# Patient Record
Sex: FEMALE | Race: WHITE | Employment: OTHER | URBAN - METROPOLITAN AREA
[De-identification: names, ages, dates, MRNs, and addresses within clinical notes are randomized per-mention and may not be internally consistent; named-entity substitution may affect disease eponyms.]

---

## 2017-01-06 ENCOUNTER — OFFICE VISIT (OUTPATIENT)
Dept: NEUROLOGY | Age: 67
End: 2017-01-06

## 2017-01-06 VITALS
HEART RATE: 80 BPM | OXYGEN SATURATION: 95 % | BODY MASS INDEX: 42.27 KG/M2 | SYSTOLIC BLOOD PRESSURE: 132 MMHG | DIASTOLIC BLOOD PRESSURE: 60 MMHG | HEIGHT: 66 IN | WEIGHT: 263 LBS

## 2017-01-06 DIAGNOSIS — R51.9 CHRONIC DAILY HEADACHE: ICD-10-CM

## 2017-01-06 DIAGNOSIS — R20.0 NUMBNESS AND TINGLING IN BOTH HANDS: ICD-10-CM

## 2017-01-06 DIAGNOSIS — G47.00 INSOMNIA, UNSPECIFIED TYPE: ICD-10-CM

## 2017-01-06 DIAGNOSIS — R20.2 NUMBNESS AND TINGLING IN BOTH HANDS: ICD-10-CM

## 2017-01-06 DIAGNOSIS — R42 DIZZINESS: Primary | ICD-10-CM

## 2017-01-06 RX ORDER — AMITRIPTYLINE HYDROCHLORIDE 10 MG/1
10 TABLET, FILM COATED ORAL
Qty: 30 TAB | Refills: 5 | Status: SHIPPED | OUTPATIENT
Start: 2017-01-06 | End: 2017-08-15

## 2017-01-06 NOTE — PATIENT INSTRUCTIONS
[facility]   Statement to Patients  April 1, 2014      In an effort to ensure the large volume of patient prescription refills is processed in the most efficient and expeditious manner, we are asking our patients to assist us by calling your Pharmacy for all prescription refills, this will include also your  Mail Order Pharmacy. The pharmacy will contact our office electronically to continue the refill process. Please do not wait until the last minute to call your pharmacy. We need at least 48 hours (2days) to fill prescriptions. We also encourage you to call your pharmacy before going to  your prescription to make sure it is ready. With regard to controlled substance prescription refill requests (narcotic refills) that need to be picked up at our office, we ask your cooperation by providing us with at least 72 hours (3days) notice that you will need a refill. We will not refill narcotic prescription refill requests after 4:00pm on any weekday, Monday through Thursday, or after 2:00pm on Fridays, or on the weekends. We encourage everyone to explore another way of getting your prescription refill request processed using [employer], our patient web portal through our electronic medical record system. [employer] is an efficient and effective way to communicate your medication request directly to the office and  downloadable as an eliz on your smart phone . [employer] also features a review functionality that allows you to view your medication list as well as leave messages for your physician. Are you ready to get connected? If so please review the attatched instructions or speak to any of our staff to get you set up right away! Thank you so much for your cooperation. Should you have any questions please contact our Practice Administrator.     The Physicians and Staff,  [facility]          A Healthy Lifestyle: Care Instructions  Your Care Instructions  A healthy lifestyle can help you feel good, stay at a healthy weight, and have plenty of energy for both work and play. A healthy lifestyle is something you can share with your whole family. A healthy lifestyle also can lower your risk for serious health problems, such as high blood pressure, heart disease, and diabetes. You can follow a few steps listed below to improve your health and the health of your family. Follow-up care is a key part of your treatment and safety. Be sure to make and go to all appointments, and call your doctor if you are having problems. Its also a good idea to know your test results and keep a list of the medicines you take. How can you care for yourself at home? · Do not eat too much sugar, fat, or fast foods. You can still have dessert and treats now and then. The goal is moderation. · Start small to improve your eating habits. Pay attention to portion sizes, drink less juice and soda pop, and eat more fruits and vegetables. ¨ Eat a healthy amount of food. A 3-ounce serving of meat, for example, is about the size of a deck of cards. Fill the rest of your plate with vegetables and whole grains. ¨ Limit the amount of soda and sports drinks you have every day. Drink more water when you are thirsty. ¨ Eat at least 5 servings of fruits and vegetables every day. It may seem like a lot, but it is not hard to reach this goal. A serving or helping is 1 piece of fruit, 1 cup of vegetables, or 2 cups of leafy, raw vegetables. Have an apple or some carrot sticks as an afternoon snack instead of a candy bar. Try to have fruits and/or vegetables at every meal.  · Make exercise part of your daily routine. You may want to start with simple activities, such as walking, bicycling, or slow swimming. Try to be active 30 to 60 minutes every day. You do not need to do all 30 to 60 minutes all at once. For example, you can exercise 3 times a day for 10 or 20 minutes.  Moderate exercise is safe for most people, but it is always a good idea to talk to your doctor before starting an exercise program.  · Keep moving. Teri Sebastians the lawn, work in the garden, or [employer]. Take the stairs instead of the elevator at work. · If you smoke, quit. People who smoke have an increased risk for heart attack, stroke, cancer, and other lung illnesses. Quitting is hard, but there are ways to boost your chance of quitting tobacco for good. ¨ Use nicotine gum, patches, or lozenges. ¨ Ask your doctor about stop-smoking programs and medicines. ¨ Keep trying. In addition to reducing your risk of diseases in the future, you will notice some benefits soon after you stop using tobacco. If you have shortness of breath or asthma symptoms, they will likely get better within a few weeks after you quit. · Limit how much alcohol you drink. Moderate amounts of alcohol (up to 2 drinks a day for men, 1 drink a day for women) are okay. But drinking too much can lead to liver problems, high blood pressure, and other health problems. Family health  If you have a family, there are many things you can do together to improve your health. · Eat meals together as a family as often as possible. · Eat healthy foods. This includes fruits, vegetables, lean meats and dairy, and whole grains. · Include your family in your fitness plan. Most people think of activities such as jogging or tennis as the way to fitness, but there are many ways you and your family can be more active. Anything that makes you breathe hard and gets your heart pumping is exercise. Here are some tips:  ¨ Walk to do errands or to take your child to school or the bus. ¨ Go for a family bike ride after dinner instead of watching TV. Where can you learn more? Go to http://juliana-david.info/. Enter D219 in the search box to learn more about \"A Healthy Lifestyle: Care Instructions. \"  Current as of: July 26, 2016  Content Version: 11.1  © 8762-1055 [employer]. Care instructions adapted under license by [employer] (which disclaims liability or warranty for this information). If you have questions about a medical condition or this instruction, always ask your healthcare professional. [address] any warranty or liability for your use of this information.
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
## 2017-01-06 NOTE — MR AVS SNAPSHOT
Visit Information Date & Time Provider Department Dept. Phone Encounter #  
 1/6/2017  1:00 PM Imelda Ndiaye NP Neurology Clinic at [facility] [phone] [id number] Follow-up Instructions Return for after emg. Your Appointments 2/8/2017  8:00 AM  
PROCEDURE with Martin Sousa MD  
Neurology Clinic at [address]) Appt Note: EMG $0CP CC tdb 1/6/17  
 [address] [place] [address], 
[address] [place] [address], [address] [address]  
[address], [address] [place] [address], [address] [address]  
  
    
 2/8/2017  9:30 AM  
Follow Up with Imelda Ndiaye NP Neurology Clinic at [address]) Appt Note: Follow up $0CP tdb 1/6/17  
 [address] [place] [address], 
[address] [place] [address], [address] [address]  
[address], [address] [place] [address], [address] [address] Upcoming Health Maintenance Date Due Hepatitis C Screening 1950 DTaP/Tdap/Td series (1 - Tdap) 4/21/1969 FOBT Q 1 YEAR AGE 50-75 4/21/1998 ZOSTER VACCINE AGE 60> 4/21/2008 GLAUCOMA SCREENING Q2Y 4/21/2013 Pneumococcal 65+ High/Highest Risk (1 of 2 - PCV13) 4/21/2013 MEDICARE YEARLY EXAM 4/21/2013 BREAST CANCER SCRN MAMMOGRAM 2/25/2015 INFLUENZA AGE 9 TO ADULT 8/1/2016 Allergies as of 1/6/2017  Review Complete On: 1/6/2017 By: John Ohara LPN Severity Noted Reaction Type Reactions Adhes. Band-tape-benzalkonium  01/25/2012    Swelling Dairy Aid [Lactase]  04/12/2012    Unknown (comments) Gluten  05/28/2015    Diarrhea \"brain fog\", aching Iodinated Contrast Media - Oral And Iv Dye  05/28/2015    Other (comments) Gets dizzy and shakey Milk Containing Products  05/28/2015    Other (comments) Aches and stomach cramps Motrin [Ibuprofen]  04/12/2012    Unknown (comments) Other Medication  05/28/2015    Other (comments) Super sensitive to all medication Soy  2012    Unknown (comments) Sulfa (Sulfonamide Antibiotics)  2012    Unable to Obtain Wheat  2012    Unknown (comments) Current Immunizations  Never Reviewed No immunizations on file. Not reviewed this visit You Were Diagnosed With   
  
 Codes Comments Dizziness    -  Primary ICD-10-CM: A82 ICD-9-CM: 780. 4 Chronic daily headache     ICD-10-CM: R51 ICD-9-CM: 784.0 Insomnia, unspecified type     ICD-10-CM: G47.00 ICD-9-CM: 780.52 Numbness and tingling in both hands     ICD-10-CM: R20.2 ICD-9-CM: 793. 0 Vitals BP Pulse Height(growth percentile) Weight(growth percentile) SpO2 BMI  
 132/60 80 5' 6\" (1.676 m) 263 lb (119.3 kg) 95% 42.45 kg/m2 OB Status Smoking Status Postmenopausal Never Smoker Vitals History BMI and BSA Data Body Mass Index Body Surface Area  
 42.45 kg/m 2 2.36 m 2 Preferred Pharmacy Pharmacy Name Phone [facility]/PHARMACY [address] [facility] [address]. Amadou Hinton [phone] Your Updated Medication List  
  
   
This list is accurate as of: 17  1:43 PM.  Always use your most recent med list.  
  
  
  
  
 amitriptyline 10 mg tablet Commonly known as:  ELAVIL Take 1 Tab by mouth nightly. iodine Liqd Commonly known as:  IODINE STRONG Apply  to affected area as needed. * OTHER(NON-FORMULARY) Black cumin seed oil:500mg once daily * OTHER(NON-FORMULARY) Berberine: 200mmg BID  
  
 * OTHER(NON-FORMULARY) MTHFR [id number]+: 2 drops once daily * OTHER(NON-FORMULARY) Meriva: 500mg, 1000mg BID  
  
 * OTHER(NON-FORMULARY) MTR/MTRR+: 2 drops daily TIROSINT 150 mcg Cap Generic drug:  Levothyroxine Take  by mouth.  
  
 tolnaftate 1 % external solution Commonly known as:  [place] Apply  to affected area nightly. VITAMIN D3-VITAMIN K2 PO Take  by mouth. * Notice: This list has 5 medication(s) that are the same as other medications prescribed for you. Read the directions carefully, and ask your doctor or other care provider to review them with you. Prescriptions Sent to Pharmacy Refills  
 amitriptyline (ELAVIL) 10 mg tablet 5 Sig: Take 1 Tab by mouth nightly. Class: Normal  
 Pharmacy: [facility]/pharmacy [address], [address]  #: [phone] Route: Oral  
  
We Performed the Following REFERRAL TO NEUROLOGY [KPD28 Custom] Comments:  
 EMG/NCS BUE  
 REFERRAL TO PHYSICAL THERAPY [XLX58 Custom] Comments: PT eval and treat for vestibular therapy Follow-up Instructions Return for after emg. Referral Information Referral ID Referred By Referred To  
  
 [id number] Glenn Oneil V Not Available Visits Status Start Date End Date 1 New Request 1/6/17 1/6/18 If your referral has a status of pending review or denied, additional information will be sent to support the outcome of this decision. Referral ID Referred By Referred To  
 [id number] Glenn Oneil V Not Available Visits Status Start Date End Date 1 New Request 1/6/17 1/6/18 If your referral has a status of pending review or denied, additional information will be sent to support the outcome of this decision. Patient Instructions PRESCRIPTION REFILL POLICY [facility] Statement to Patients April 1, 2014 In an effort to ensure the large volume of patient prescription refills is processed in the most efficient and expeditious manner, we are asking our patients to assist us by calling your Pharmacy for all prescription refills, this will include also your  Mail Order Pharmacy. The pharmacy will contact our office electronically to continue the refill process. Please do not wait until the last minute to call your pharmacy.  We need at least 48 hours (2days) to fill prescriptions. We also encourage you to call your pharmacy before going to  your prescription to make sure it is ready. With regard to controlled substance prescription refill requests (narcotic refills) that need to be picked up at our office, we ask your cooperation by providing us with at least 72 hours (3days) notice that you will need a refill. We will not refill narcotic prescription refill requests after 4:00pm on any weekday, Monday through Thursday, or after 2:00pm on Fridays, or on the weekends. We encourage everyone to explore another way of getting your prescription refill request processed using [employer]St[employer], our patient web portal through our electronic medical record system. [employer]St[employer]ak is an efficient and effective way to communicate your medication request directly to the office and  downloadable as an eliz on your smart phone . [employer] also features a review functionality that allows you to view your medication list as well as leave messages for your physician. Are you ready to get connected? If so please review the attatched instructions or speak to any of our staff to get you set up right away! Thank you so much for your cooperation. Should you have any questions please contact our Practice Administrator. The Physicians and Staff,  [facility] A Healthy Lifestyle: Care Instructions Your Care Instructions A healthy lifestyle can help you feel good, stay at a healthy weight, and have plenty of energy for both work and play. A healthy lifestyle is something you can share with your whole family. A healthy lifestyle also can lower your risk for serious health problems, such as high blood pressure, heart disease, and diabetes. You can follow a few steps listed below to improve your health and the health of your family. Follow-up care is a key part of your treatment and safety.  Be sure to make and go to all appointments, and call your doctor if you are having problems. Its also a good idea to know your test results and keep a list of the medicines you take. How can you care for yourself at home? · Do not eat too much sugar, fat, or fast foods. You can still have dessert and treats now and then. The goal is moderation. · Start small to improve your eating habits. Pay attention to portion sizes, drink less juice and soda pop, and eat more fruits and vegetables. ¨ Eat a healthy amount of food. A 3-ounce serving of meat, for example, is about the size of a deck of cards. Fill the rest of your plate with vegetables and whole grains. ¨ Limit the amount of soda and sports drinks you have every day. Drink more water when you are thirsty. ¨ Eat at least 5 servings of fruits and vegetables every day. It may seem like a lot, but it is not hard to reach this goal. A serving or helping is 1 piece of fruit, 1 cup of vegetables, or 2 cups of leafy, raw vegetables. Have an apple or some carrot sticks as an afternoon snack instead of a candy bar. Try to have fruits and/or vegetables at every meal. 
· Make exercise part of your daily routine. You may want to start with simple activities, such as walking, bicycling, or slow swimming. Try to be active 30 to 60 minutes every day. You do not need to do all 30 to 60 minutes all at once. For example, you can exercise 3 times a day for 10 or 20 minutes. Moderate exercise is safe for most people, but it is always a good idea to talk to your doctor before starting an exercise program. 
· Keep moving. Delma Boys the lawn, work in the garden, or [employer]. Take the stairs instead of the elevator at work. · If you smoke, quit. People who smoke have an increased risk for heart attack, stroke, cancer, and other lung illnesses. Quitting is hard, but there are ways to boost your chance of quitting tobacco for good. ¨ Use nicotine gum, patches, or lozenges. ¨ Ask your doctor about stop-smoking programs and medicines. ¨ Keep trying. In addition to reducing your risk of diseases in the future, you will notice some benefits soon after you stop using tobacco. If you have shortness of breath or asthma symptoms, they will likely get better within a few weeks after you quit. · Limit how much alcohol you drink. Moderate amounts of alcohol (up to 2 drinks a day for men, 1 drink a day for women) are okay. But drinking too much can lead to liver problems, high blood pressure, and other health problems. Family health If you have a family, there are many things you can do together to improve your health. · Eat meals together as a family as often as possible. · Eat healthy foods. This includes fruits, vegetables, lean meats and dairy, and whole grains. · Include your family in your fitness plan. Most people think of activities such as jogging or tennis as the way to fitness, but there are many ways you and your family can be more active. Anything that makes you breathe hard and gets your heart pumping is exercise. Here are some tips: 
¨ Walk to do errands or to take your child to school or the bus. ¨ Go for a family bike ride after dinner instead of watching TV. Where can you learn more? Go to http://juliana-david.info/. Enter H729 in the search box to learn more about \"A Healthy Lifestyle: Care Instructions. \" Current as of: July 26, 2016 Content Version: 11.1 © 4254-9647 [employer]. Care instructions adapted under license by [employer] (which disclaims liability or warranty for this information). If you have questions about a medical condition or this instruction, always ask your healthcare professional. [address] any warranty or liability for your use of this information. Introducing [facility]! Dear Hussain Harkins: 
Thank you for requesting a [facility] account.   Our records indicate that you already have an active [employer] account. You can access your account anytime at [email or web address] Did you know that you can access your hospital and ER discharge instructions at any time in [employer]? You can also review all of your test results from your hospital stay or ER visit. Additional Information If you have questions, please visit the Frequently Asked Questions section of the [employer] website at [email or web address] Remember, [employer] is NOT to be used for urgent needs. For medical emergencies, dial 911. Now available from your iPhone and Android! Please provide this summary of care documentation to your next provider. Your primary care clinician is listed as [address] [place] [address]. If you have any questions after today's visit, please call [phone].

## 2017-04-19 ENCOUNTER — OFFICE VISIT (OUTPATIENT)
Dept: NEUROLOGY | Age: 67
End: 2017-04-19

## 2017-04-19 VITALS
OXYGEN SATURATION: 97 % | DIASTOLIC BLOOD PRESSURE: 61 MMHG | HEART RATE: 67 BPM | WEIGHT: 264 LBS | BODY MASS INDEX: 42.43 KG/M2 | SYSTOLIC BLOOD PRESSURE: 144 MMHG | HEIGHT: 66 IN

## 2017-04-19 VITALS
OXYGEN SATURATION: 97 % | DIASTOLIC BLOOD PRESSURE: 61 MMHG | HEART RATE: 67 BPM | SYSTOLIC BLOOD PRESSURE: 144 MMHG | RESPIRATION RATE: 18 BRPM

## 2017-04-19 DIAGNOSIS — M47.22 CERVICAL RADICULOPATHY DUE TO DEGENERATIVE JOINT DISEASE OF SPINE: ICD-10-CM

## 2017-04-19 DIAGNOSIS — G56.03 CARPAL TUNNEL SYNDROME, BILATERAL UPPER LIMBS: Primary | ICD-10-CM

## 2017-04-19 DIAGNOSIS — G43.009 MIGRAINE WITHOUT AURA AND WITHOUT STATUS MIGRAINOSUS, NOT INTRACTABLE: ICD-10-CM

## 2017-04-19 DIAGNOSIS — G56.03 BILATERAL CARPAL TUNNEL SYNDROME: Primary | ICD-10-CM

## 2017-04-19 DIAGNOSIS — R42 DIZZINESS: ICD-10-CM

## 2017-04-19 DIAGNOSIS — G93.32 CHRONIC FATIGUE SYNDROME: ICD-10-CM

## 2017-04-19 DIAGNOSIS — M79.7 FIBROMYALGIA: ICD-10-CM

## 2017-04-19 DIAGNOSIS — E03.9 HYPOTHYROIDISM (ACQUIRED): ICD-10-CM

## 2017-04-19 DIAGNOSIS — G56.22 ULNAR NERVE COMPRESSION, LEFT: ICD-10-CM

## 2017-04-19 DIAGNOSIS — G25.0 ESSENTIAL TREMOR: ICD-10-CM

## 2017-04-19 NOTE — PROCEDURES
[facility] Neurology Clinic at [address] [facility] [address] [facility], [address] [facility]  Tel [phone]     Fax [phone]  Electrodiagnostic Study Report  Test Date:  2017    Patient: Truong Trevizo : 1950 Physician: Phillip Reyna MD   Sex: Male  < Ref Phys: Charley Tejada NP     Clinical Indication: Patient is a 78-year-old  female with complaints of numbness in her hands, left greater than right, for EMG study to rule out neuropathy, entrapment neuropathy, or cervical radiculopathy. Impression: This study shows electrophysiologic evidence of mild bilateral distal median nerve entrapment at the wrists, consistent with carpal tunnel syndromes, without evidence of axonal or denervation changes present. This entrapment was manifest by mild prolongation of the distal motor and sensory latency of the median nerve on the left, and mild prolongation of the distal sensory latency of the median nerve on the right with slight reduced amplitude of the compound motor and sensory action potentials. No denervation changes seen. No evidence of cervical radiculopathy seen. No primary neuropathy seen. Clinical correlation recommended, and correlation with imaging modalities and metabolic studies may be also a further diagnostic benefit if clinically indicated. EMG & NCV Findings:  Evaluation of the Left median motor nerve showed prolonged distal onset latency (4.6 ms) and reduced amplitude (4.9 mV). The Right median motor nerve showed reduced amplitude (4.2 mV). The Left median/ulnar (palm) comparison and the Right median/ulnar (palm) comparison nerves showed prolonged distal peak latency (Median Palm, L2.9, R2.7 ms) and abnormal peak latency difference (Median Palm-Ulnar Palm, L1.0, R0.8 ms). All remaining nerves (as indicated in the following tables) were within normal limits. All F Wave latencies were within normal limits.         Nerve Conduction Studies  Anti Sensory Summary Table     Site NR Peak (ms) P-T Amp (µV) Site1 Site2 Delta-P (ms) Dist (cm) Dinesh (m/s)   Left Median Anti Sensory (2nd Digit)  32.8°C   Wrist    3.6 30.2 Wrist 2nd Digit 3.6 14.0 39   Left Ulnar Anti Sensory (5th Digit)  32.8°C   Wrist    2.6 31.0 Wrist 5th Digit 2.6 14.0 54     Motor Summary Table     Site NR Onset (ms) O-P Amp (mV) Site1 Site2 Delta-0 (ms) Dist (cm) Dinesh (m/s)   Left Median Motor (Abd Poll Brev)  31.1°C   Wrist    4.6 4.9 Elbow Wrist 4.6 26.0 57   Elbow    9.2 4.2        Right Median Motor (Abd Poll Brev)  31.1°C   Wrist    4.1 4.2 Elbow Wrist 4.8 28.0 58   Elbow    8.9 4.1        Left Ulnar Motor (Abd Dig Minimi)  32.5°C   Wrist    2.7 9.3 B Elbow Wrist 3.2 21.0 66   B Elbow    5.9 7.0 A Elbow B Elbow 1.8 10.0 56   A Elbow    7.7 7.2          Comparison Summary Table     Site NR Peak (ms) P-T Amp (µV) Site1 Site2 Delta-P (ms)   Left Median/Ulnar Palm Comparison (Wrist - 8cm)  32.8°C   Median Palm    2.9 37.8 Median Palm Ulnar Palm 1.0   Ulnar Palm    1.9 20.5      Right Median/Ulnar Palm Comparison (Wrist - 8cm)  32°C   Median Palm    2.7 39.2 Median Palm Ulnar Palm 0.8   Ulnar Palm    1.9 29.6        F Wave Studies     NR F-Lat (ms) L-R F-Lat (ms)   Left Median (Mrkrs) (Abd Poll Brev)  32.3°C      30.23    Left Ulnar (Mrkrs) (Abd Dig Min)  32.7°C      28.07      EMG     Side Muscle Nerve Root Ins Act Fibs Psw Amp Dur Poly Recrt Comment   Right Abd Poll Brev Median C8-T1 Nml Nml Nml Nml Nml 0 Nml    Left Abd Poll Brev Median C8-T1 Nml Nml Nml Nml Nml 0 Nml    Left 1stDorInt Ulnar C8-T1 Nml Nml Nml Nml Nml 0 Nml    Left Biceps Musculocut C5-6 Nml Nml Nml Nml Nml 0 Nml    Left ABD Dig Min Ulnar C8-T1 Nml Nml Nml Nml Nml 0 Nml    Left Triceps Radial C6-7-8 Nml Nml Nml Nml Nml 0 Nml    Left Deltoid Axillary C5-6 Nml Nml Nml Nml Nml 0 Nml    Left Cervical Parasp Mid Rami C4-6 Nml Nml Nml        Left BrachioRad Radial C5-6 Nml Nml Nml Nml Nml 0 Nml    Left FlexPolLong Median (Ant Int) C7-8 Nml Nml Nml Nml Nml 0 Nml    Left PronatorTeres Median C6-7 Nml Nml Nml Nml Nml 0 Nml    Right 1stDorInt Ulnar C8-T1 Nml Nml Nml Nml Nml 0 Nml    Right Biceps Musculocut C5-6 Nml Nml Nml Nml Nml 0 Nml    Right Triceps Radial C6-7-8 Nml Nml Nml Nml Nml 0 Nml    Right Deltoid Axillary C5-6 Nml Nml Nml Nml Nml 0 Nml    Right Cervical Parasp Mid Rami C4-6 Nml Nml Nml        Right BrachioRad Radial C5-6 Nml Nml Nml Nml Nml 0 Nml    Right FlexPolLong Median (Ant Int) C7-8 Nml Nml Nml Nml Nml 0 Nml        Waveforms:                           __________________  Chino Sanchez M.D.

## 2017-04-19 NOTE — PATIENT INSTRUCTIONS
You can call this doctor and make an appointment if you are concerned about your thyroid, you saw him last in 2015:  [facility]    Address: [address],, [address], [address]  Phone: [phone]    [facility] to [facility] physical therapy, a referral was sent in January. Call us if you need a new referral:  [address]  [place], [address]  Phone: [address] [facility]   Statement to Patients  April 1, 2014      In an effort to ensure the large volume of patient prescription refills is processed in the most efficient and expeditious manner, we are asking our patients to assist us by calling your Pharmacy for all prescription refills, this will include also your  Mail Order Pharmacy. The pharmacy will contact our office electronically to continue the refill process. Please do not wait until the last minute to call your pharmacy. We need at least 48 hours (2days) to fill prescriptions. We also encourage you to call your pharmacy before going to  your prescription to make sure it is ready. With regard to controlled substance prescription refill requests (narcotic refills) that need to be picked up at our office, we ask your cooperation by providing us with at least 72 hours (3days) notice that you will need a refill. We will not refill narcotic prescription refill requests after 4:00pm on any weekday, Monday through Thursday, or after 2:00pm on Fridays, or on the weekends. We encourage everyone to explore another way of getting your prescription refill request processed using [employer], our patient web portal through our electronic medical record system. Sweet[employer] is an efficient and effective way to communicate your medication request directly to the office and  downloadable as an eliz on your smart phone .  [employer] also features a review functionality that allows you to view your medication list as well as leave messages for your physician. Are you ready to get connected? If so please review the attatched instructions or speak to any of our staff to get you set up right away! Thank you so much for your cooperation. Should you have any questions please contact our Practice Administrator. The Physicians and Staff,  [facility]          A Healthy Lifestyle: Care Instructions  Your Care Instructions  A healthy lifestyle can help you feel good, stay at a healthy weight, and have plenty of energy for both work and play. A healthy lifestyle is something you can share with your whole family. A healthy lifestyle also can lower your risk for serious health problems, such as high blood pressure, heart disease, and diabetes. You can follow a few steps listed below to improve your health and the health of your family. Follow-up care is a key part of your treatment and safety. Be sure to make and go to all appointments, and call your doctor if you are having problems. Its also a good idea to know your test results and keep a list of the medicines you take. How can you care for yourself at home? · Do not eat too much sugar, fat, or fast foods. You can still have dessert and treats now and then. The goal is moderation. · Start small to improve your eating habits. Pay attention to portion sizes, drink less juice and soda pop, and eat more fruits and vegetables. ¨ Eat a healthy amount of food. A 3-ounce serving of meat, for example, is about the size of a deck of cards. Fill the rest of your plate with vegetables and whole grains. ¨ Limit the amount of soda and sports drinks you have every day. Drink more water when you are thirsty. ¨ Eat at least 5 servings of fruits and vegetables every day.  It may seem like a lot, but it is not hard to reach this goal. A serving or helping is 1 piece of fruit, 1 cup of vegetables, or 2 cups of leafy, raw vegetables. Have an apple or some carrot sticks as an afternoon snack instead of a candy bar. Try to have fruits and/or vegetables at every meal.  · Make exercise part of your daily routine. You may want to start with simple activities, such as walking, bicycling, or slow swimming. Try to be active 30 to 60 minutes every day. You do not need to do all 30 to 60 minutes all at once. For example, you can exercise 3 times a day for 10 or 20 minutes. Moderate exercise is safe for most people, but it is always a good idea to talk to your doctor before starting an exercise program.  · Keep moving. Esther Cook the lawn, work in the garden, or [employer]. Take the stairs instead of the elevator at work. · If you smoke, quit. People who smoke have an increased risk for heart attack, stroke, cancer, and other lung illnesses. Quitting is hard, but there are ways to boost your chance of quitting tobacco for good. ¨ Use nicotine gum, patches, or lozenges. ¨ Ask your doctor about stop-smoking programs and medicines. ¨ Keep trying. In addition to reducing your risk of diseases in the future, you will notice some benefits soon after you stop using tobacco. If you have shortness of breath or asthma symptoms, they will likely get better within a few weeks after you quit. · Limit how much alcohol you drink. Moderate amounts of alcohol (up to 2 drinks a day for men, 1 drink a day for women) are okay. But drinking too much can lead to liver problems, high blood pressure, and other health problems. Family health  If you have a family, there are many things you can do together to improve your health. · Eat meals together as a family as often as possible. · Eat healthy foods. This includes fruits, vegetables, lean meats and dairy, and whole grains. · Include your family in your fitness plan.  Most people think of activities such as jogging or tennis as the way to fitness, but there are many ways you and your family can be more active. Anything that makes you breathe hard and gets your heart pumping is exercise. Here are some tips:  ¨ Walk to do errands or to take your child to school or the bus. ¨ Go for a family bike ride after dinner instead of watching TV. Where can you learn more? Go to http://juliana-david.info/. Enter G778 in the search box to learn more about \"A Healthy Lifestyle: Care Instructions. \"  Current as of: July 26, 2016  Content Version: 11.2  © 8086-5166 [employer]. Care instructions adapted under license by [employer] (which disclaims liability or warranty for this information). If you have questions about a medical condition or this instruction, always ask your healthcare professional. [address] any warranty or liability for your use of this information.

## 2017-04-19 NOTE — LETTER
4/19/2017 4:03 PM 
 
Patient:  Estiven Cespedes YOB: 1950 Date of Visit: 4/19/2017 Dear No Recipients: Thank you for referring Ms. Harinder Diaz to me for evaluation/treatment. Below are the relevant portions of my assessment and plan of care. EMG dictated in procedure note If you have questions, please do not hesitate to call me. I look forward to following Ms. Rodriguez along with you. Sincerely, Ness Livingston MD

## 2017-04-19 NOTE — MR AVS SNAPSHOT
Visit Information Date & Time Provider Department Dept. Phone Encounter #  
 4/19/2017 11:00 AM Francisco Javier Saldivar NP [facility] [phone] [id number] Follow-up Instructions Return in about 3 months (around 7/19/2017). Upcoming Health Maintenance Date Due Hepatitis C Screening 1950 DTaP/Tdap/Td series (1 - Tdap) 4/21/1969 FOBT Q 1 YEAR AGE 50-75 4/21/1998 ZOSTER VACCINE AGE 60> 4/21/2008 GLAUCOMA SCREENING Q2Y 4/21/2013 Pneumococcal 65+ High/Highest Risk (1 of 2 - PCV13) 4/21/2013 MEDICARE YEARLY EXAM 4/21/2013 BREAST CANCER SCRN MAMMOGRAM 2/25/2015 INFLUENZA AGE 9 TO ADULT 8/1/2016 Allergies as of 4/19/2017  Review Complete On: 4/19/2017 By: Renee Escobar LPN Severity Noted Reaction Type Reactions Adhes. Band-tape-benzalkonium  01/25/2012    Swelling Dairy Aid [Lactase]  04/12/2012    Unknown (comments) Gluten  05/28/2015    Diarrhea \"brain fog\", aching Iodinated Contrast Media - Oral And Iv Dye  05/28/2015    Other (comments) Gets dizzy and shakey Milk Containing Products  05/28/2015    Other (comments) Aches and stomach cramps Motrin [Ibuprofen]  04/12/2012    Unknown (comments) Other Medication  05/28/2015    Other (comments) Super sensitive to all medication Soy  04/12/2012    Unknown (comments) Sulfa (Sulfonamide Antibiotics)  03/16/2012    Unable to Obtain Wheat  04/12/2012    Unknown (comments) Current Immunizations  Never Reviewed No immunizations on file. Not reviewed this visit You Were Diagnosed With   
  
 Codes Comments Bilateral carpal tunnel syndrome    -  Primary ICD-10-CM: G56.03 
ICD-9-CM: 354.0 Ulnar nerve compression, left     ICD-10-CM: G56.22 
ICD-9-CM: 354. 2 Vitals BP Pulse Height(growth percentile) Weight(growth percentile) SpO2 BMI  
 144/61 67 5' 6\" (1.676 m) 264 lb (119.7 kg) 97% 42.61 kg/m2 OB Status Smoking Status Postmenopausal Never Smoker Vitals History BMI and BSA Data Body Mass Index Body Surface Area  
 42.61 kg/m 2 2.36 m 2 Preferred Pharmacy Pharmacy Name Phone [facility] [phone] Your Updated Medication List  
  
   
This list is accurate as of: 17 11:57 AM.  Always use your most recent med list.  
  
  
  
  
 amitriptyline 10 mg tablet Commonly known as:  ELAVIL Take 1 Tab by mouth nightly. iodine Liqd Commonly known as:  IODINE STRONG Apply  to affected area as needed. * OTHER(NON-FORMULARY) Black cumin seed oil:500mg once daily * OTHER(NON-FORMULARY) Berberine: 200mmg BID  
  
 * OTHER(NON-FORMULARY) MTHFR [id number]+: 2 drops once daily * OTHER(NON-FORMULARY) Meriva: 500mg, 1000mg BID  
  
 * OTHER(NON-FORMULARY) MTR/MTRR+: 2 drops daily TIROSINT 150 mcg Cap Generic drug:  Levothyroxine Take  by mouth.  
  
 tolnaftate 1 % external solution Commonly known as:  [place] Apply  to affected area nightly. VITAMIN D3-VITAMIN K2 PO Take  by mouth. * Notice: This list has 5 medication(s) that are the same as other medications prescribed for you. Read the directions carefully, and ask your doctor or other care provider to review them with you. Follow-up Instructions Return in about 3 months (around 2017). Patient Instructions You can call this doctor and make an appointment if you are concerned about your thyroid, you saw him last in : 
[facility] Address: [facility],, [address] Phone: [phone] [facility] to [facility], a referral was sent in January. Call us if you need a new referral: 
[address] [address] [address] [facility] Phone: [phone] - Outpatient PRESCRIPTION REFILL POLICY [facility] Statement to Patients April 1, 2014 In an effort to ensure the large volume of patient prescription refills is processed in the most efficient and expeditious manner, we are asking our patients to assist us by calling your Pharmacy for all prescription refills, this will include also your  Mail Order Pharmacy. The pharmacy will contact our office electronically to continue the refill process. Please do not wait until the last minute to call your pharmacy. We need at least 48 hours (2days) to fill prescriptions. We also encourage you to call your pharmacy before going to  your prescription to make sure it is ready. With regard to controlled substance prescription refill requests (narcotic refills) that need to be picked up at our office, we ask your cooperation by providing us with at least 72 hours (3days) notice that you will need a refill. We will not refill narcotic prescription refill requests after 4:00pm on any weekday, Monday through Thursday, or after 2:00pm on Fridays, or on the weekends. We encourage everyone to explore another way of getting your prescription refill request processed using [employer], our patient web portal through our electronic medical record system. [employer] is an efficient and effective way to communicate your medication request directly to the office and  downloadable as an eliz on your smart phone . [employer] also features a review functionality that allows you to view your medication list as well as leave messages for your physician. Are you ready to get connected? If so please review the attatched instructions or speak to any of our staff to get you set up right away! Thank you so much for your cooperation. Should you have any questions please contact our Practice Administrator. The Physicians and Staff,  [facility] A Healthy Lifestyle: Care Instructions Your Care Instructions A healthy lifestyle can help you feel good, stay at a healthy weight, and have plenty of energy for both work and play. A healthy lifestyle is something you can share with your whole family. A healthy lifestyle also can lower your risk for serious health problems, such as high blood pressure, heart disease, and diabetes. You can follow a few steps listed below to improve your health and the health of your family. Follow-up care is a key part of your treatment and safety. Be sure to make and go to all appointments, and call your doctor if you are having problems. Its also a good idea to know your test results and keep a list of the medicines you take. How can you care for yourself at home? · Do not eat too much sugar, fat, or fast foods. You can still have dessert and treats now and then. The goal is moderation. · Start small to improve your eating habits. Pay attention to portion sizes, drink less juice and soda pop, and eat more fruits and vegetables. ¨ Eat a healthy amount of food. A 3-ounce serving of meat, for example, is about the size of a deck of cards. Fill the rest of your plate with vegetables and whole grains. ¨ Limit the amount of soda and sports drinks you have every day. Drink more water when you are thirsty. ¨ Eat at least 5 servings of fruits and vegetables every day. It may seem like a lot, but it is not hard to reach this goal. A serving or helping is 1 piece of fruit, 1 cup of vegetables, or 2 cups of leafy, raw vegetables. Have an apple or some carrot sticks as an afternoon snack instead of a candy bar. Try to have fruits and/or vegetables at every meal. 
· Make exercise part of your daily routine. You may want to start with simple activities, such as walking, bicycling, or slow swimming. Try to be active 30 to 60 minutes every day.  You do not need to do all 30 to 60 minutes all at once. For example, you can exercise 3 times a day for 10 or 20 minutes. Moderate exercise is safe for most people, but it is always a good idea to talk to your doctor before starting an exercise program. 
· Keep moving. Venessa Gums the lawn, work in the garden, or RUNform. Take the stairs instead of the elevator at work. · If you smoke, quit. People who smoke have an increased risk for heart attack, stroke, cancer, and other lung illnesses. Quitting is hard, but there are ways to boost your chance of quitting tobacco for good. ¨ Use nicotine gum, patches, or lozenges. ¨ Ask your doctor about stop-smoking programs and medicines. ¨ Keep trying. In addition to reducing your risk of diseases in the future, you will notice some benefits soon after you stop using tobacco. If you have shortness of breath or asthma symptoms, they will likely get better within a few weeks after you quit. · Limit how much alcohol you drink. Moderate amounts of alcohol (up to 2 drinks a day for men, 1 drink a day for women) are okay. But drinking too much can lead to liver problems, high blood pressure, and other health problems. Family health If you have a family, there are many things you can do together to improve your health. · Eat meals together as a family as often as possible. · Eat healthy foods. This includes fruits, vegetables, lean meats and dairy, and whole grains. · Include your family in your fitness plan. Most people think of activities such as jogging or tennis as the way to fitness, but there are many ways you and your family can be more active. Anything that makes you breathe hard and gets your heart pumping is exercise. Here are some tips: 
¨ Walk to do errands or to take your child to school or the bus. ¨ Go for a family bike ride after dinner instead of watching TV. Where can you learn more? Go to http://juliana-david.info/. Enter L096 in the search box to learn more about \"A Healthy Lifestyle: Care Instructions. \" Current as of: July 26, 2016 Content Version: 11.2 © 8466-8096 [employer]. Care instructions adapted under license by [employer] (which disclaims liability or warranty for this information). If you have questions about a medical condition or this instruction, always ask your healthcare professional. [address] any warranty or liability for your use of this information. Introducing [facility]! Dear Magdalena Carty: 
Thank you for requesting a ibabybox account. Our records indicate that you already have an active ibabybox account. You can access your account anytime at [email or web address] Did you know that you can access your hospital and ER discharge instructions at any time in ibabybox? You can also review all of your test results from your hospital stay or ER visit. Additional Information If you have questions, please visit the Frequently Asked Questions section of the ibabybox website at [email or web address] Remember, ibabybox is NOT to be used for urgent needs. For medical emergencies, dial 911. Now available from your iPhone and Android! Please provide this summary of care documentation to your next provider. Your primary care clinician is listed as [facility]. If you have any questions after today's visit, please call [phone].
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
## 2017-08-15 ENCOUNTER — OFFICE VISIT (OUTPATIENT)
Dept: NEUROLOGY | Age: 67
End: 2017-08-15

## 2017-08-15 DIAGNOSIS — R51.9 CHRONIC DAILY HEADACHE: Primary | ICD-10-CM

## 2017-08-15 DIAGNOSIS — G56.03 CARPAL TUNNEL SYNDROME, BILATERAL UPPER LIMBS: ICD-10-CM

## 2017-08-15 DIAGNOSIS — G93.32 CHRONIC FATIGUE SYNDROME: ICD-10-CM

## 2017-08-15 DIAGNOSIS — M54.41 ACUTE RIGHT-SIDED LOW BACK PAIN WITH RIGHT-SIDED SCIATICA: ICD-10-CM

## 2017-08-15 DIAGNOSIS — G47.00 INSOMNIA, UNSPECIFIED TYPE: ICD-10-CM

## 2017-08-15 RX ORDER — IBUPROFEN 600 MG/1
TABLET ORAL
COMMUNITY
Start: 2017-08-07 | End: 2018-05-13

## 2017-08-15 RX ORDER — HYDROCODONE BITARTRATE AND ACETAMINOPHEN 7.5; 325 MG/1; MG/1
TABLET ORAL
COMMUNITY
Start: 2017-08-07 | End: 2018-05-13

## 2017-08-15 RX ORDER — GABAPENTIN 100 MG/1
100 CAPSULE ORAL
Qty: 30 CAP | Refills: 5 | Status: SHIPPED | OUTPATIENT
Start: 2017-08-15 | End: 2018-02-02 | Stop reason: SDUPTHER

## 2017-08-15 RX ORDER — AMOXICILLIN 500 MG/1
CAPSULE ORAL
COMMUNITY
Start: 2017-08-07 | End: 2018-05-13

## 2017-08-15 NOTE — PATIENT INSTRUCTIONS
10 [facility]   Statement to Patients  April 1, 2014      In an effort to ensure the large volume of patient prescription refills is processed in the most efficient and expeditious manner, we are asking our patients to assist us by calling your Pharmacy for all prescription refills, this will include also your  Mail Order Pharmacy. The pharmacy will contact our office electronically to continue the refill process. Please do not wait until the last minute to call your pharmacy. We need at least 48 hours (2days) to fill prescriptions. We also encourage you to call your pharmacy before going to  your prescription to make sure it is ready. With regard to controlled substance prescription refill requests (narcotic refills) that need to be picked up at our office, we ask your cooperation by providing us with at least 72 hours (3days) notice that you will need a refill. We will not refill narcotic prescription refill requests after 4:00pm on any weekday, Monday through Thursday, or after 2:00pm on Fridays, or on the weekends. We encourage everyone to explore another way of getting your prescription refill request processed using rPath, our patient web portal through our electronic medical record system. rPath is an efficient and effective way to communicate your medication request directly to the office and  downloadable as an eliz on your smart phone . rPath also features a review functionality that allows you to view your medication list as well as leave messages for your physician. Are you ready to get connected? If so please review the attatched instructions or speak to any of our staff to get you set up right away! Thank you so much for your cooperation. Should you have any questions please contact our Practice Administrator.     The Physicians and Staff,  [facility]     Patient Instructions/Plans:  ·        Carpal Tunnel Syndrome: Exercises  Your Care Instructions  Here are some examples of typical rehabilitation exercises for your condition. Start each exercise slowly. Ease off the exercise if you start to have pain. Your doctor or your physical or occupational therapist will tell you when you can start these exercises and which ones will work best for you. How to do the exercises  Note: When you no longer have pain or numbness, you can do exercises to help prevent carpal tunnel syndrome from coming back. Do not do any stretch or movement that is uncomfortable or painful. Warm-up stretches  2. Rotate your wrist up, down, and from side to side. Repeat 4 times. 3. Stretch your fingers far apart. Relax them, and then stretch them again. Repeat 4 times. 4. Stretch your thumb by pulling it back gently, holding it, and then releasing it. Repeat 4 times. Prayer stretch    1. Start with your palms together in front of your chest just below your chin. 2. Slowly lower your hands toward your waistline, keeping your hands close to your stomach and your palms together until you feel a mild to moderate stretch under your forearms. 3. Hold for at least 15 to 30 seconds. Repeat 2 to 4 times. Wrist flexor stretch    1. Extend your arm in front of you with your palm up. 2. Bend your wrist, pointing your hand toward the floor. 3. With your other hand, gently bend your wrist farther until you feel a mild to moderate stretch in your forearm. 4. Hold for at least 15 to 30 seconds. Repeat 2 to 4 times. Wrist extensor stretch    Repeat steps 1 through 4 of the stretch above, but begin with your extended hand palm down. Follow-up care is a key part of your treatment and safety. Be sure to make and go to all appointments, and call your doctor if you are having problems. It's also a good idea to know your test results and keep a list of the medicines you take. Where can you learn more?   Go to http://juliana-david.info/. Enter Z953 in the search box to learn more about \"Carpal Tunnel Syndrome: Exercises. \"  Current as of: March 21, 2017  Content Version: 11.3  © 8412-5952 [employer]. Care instructions adapted under license by [facility] (which disclaims liability or warranty for this information). If you have questions about a medical condition or this instruction, always ask your healthcare professional. [address] any warranty or liability for your use of this information. Patient Instructions/Plans:  Gabapentin (By mouth)   Gabapentin (agustin-a-PEN-tin)  Treats seizures and pain caused by shingles. Brand Name(s): ACTIVE-PAC with Gabapentin, Convenience Surya, Cyclo/Wanda 10/300 Pack, FusePaq Fanatrex, Wanda-V, Ehingen, Neurontin, SmartRx Wanda Kit   There may be other brand names for this medicine. When This Medicine Should Not Be Used: This medicine is not right for everyone. Do not use it if you had an allergic reaction to gabapentin. How to Use This Medicine:   Capsule, Liquid, Tablet  · Take your medicine as directed. Your dose may need to be changed several times to find what works best for you. If you have epilepsy, do not allow more than 12 hours to pass between doses. · Capsule: Swallow the capsule whole with plenty of water. Do not open, crush, or chew it. · Gralise® tablet: Swallow the tablet whole . Do not crush, break, or chew it. · Neurontin® tablet: If you break a tablet into 2 pieces, use the second half as your next dose. If you don't use it within 28 days, throw it away. · Measure the oral liquid medicine with a marked measuring spoon, oral syringe, or medicine cup. · This medicine should come with a Medication Guide. Ask your pharmacist for a copy if you do not have one. · Missed dose: Take a dose as soon as you remember. If it is almost time for your next dose, wait until then and take a regular dose.  Do not take extra medicine to make up for a missed dose. · Store the medicine in a closed container at room temperature, away from heat, moisture, and direct light. Store the Neurontin® oral liquid in the refrigerator. Do not freeze. Drugs and Foods to Avoid:   Ask your doctor or pharmacist before using any other medicine, including over-the-counter medicines, vitamins, and herbal products. · Some medicines can affect how gabapentin works. Tell your doctor if you also use any of the following:   ¨ Hydrocodone  ¨ Morphine  · If you take an antacid, wait at least 2 hours before you take gabapentin. · Tell your doctor if you use anything else that makes you sleepy. Some examples are allergy medicine, narcotic pain medicine, and alcohol. Warnings While Using This Medicine:   · Tell your doctor if you are pregnant or breastfeeding, or if you have kidney problems or are receiving dialysis. Tell your doctor if you have a history of depression or mental health problems. · This medicine may increase depression or thoughts of suicide. Tell your doctor right away if you start to feel more depressed or think about hurting yourself. · This medicine may cause a serious allergic reaction called multiorgan hypersensitivity, which can damage organs and be life-threatening. · Do not stop using this medicine suddenly. Your doctor will need to slowly decrease your dose before you stop it completely. If you take this medicine to prevent seizures, your seizures may return or occur more often if you stop this medicine suddenly. · This medicine may make you dizzy or drowsy. Do not drive or do anything else that could be dangerous until you know how this medicine affects you. · Tell any doctor or dentist who treats you that you are using this medicine. This medicine may affect certain medical test results. · Your doctor will check your progress and the effects of this medicine at regular visits. Keep all appointments.   · Keep all medicine out of the reach of children. Never share your medicine with anyone. Possible Side Effects While Using This Medicine:   Call your doctor right away if you notice any of these side effects:  · Allergic reaction: Itching or hives, swelling in your face or hands, swelling or tingling in your mouth or throat, chest tightness, trouble breathing  · Behavior problems, aggression, restlessness, trouble concentrating, moodiness (especially in children)  · Blistering, peeling, red skin rash  · Change in how much or how often you urinate, bloody or cloudy urine,  · Chest pain, fast heartbeat, trouble breathing  · Dark urine or pale stools, nausea, vomiting, loss of appetite, stomach pain, yellow skin or eyes  · Fever, rash, swollen or tender glands in the neck, armpit, or groin  · Problems with coordination, shakiness, unsteadiness  · Rapid weight gain, swelling in your hands, ankles, or feet  · Unusual moods or behaviors, thoughts of hurting yourself, feeling depressed  If you notice these less serious side effects, talk with your doctor:   · Dizziness, drowsiness, sleepiness, tiredness  If you notice other side effects that you think are caused by this medicine, tell your doctor. Call your doctor for medical advice about side effects. You may report side effects to FDA at 2-732-FDA-5710  © 2017 [address] Carmona Information is for End User's use only and may not be sold, redistributed or otherwise used for commercial purposes. The above information is an  only. It is not intended as medical advice for individual conditions or treatments. Talk to your doctor, nurse or pharmacist before following any medical regimen to see if it is safe and effective for you.

## 2017-08-15 NOTE — MR AVS SNAPSHOT
Visit Information Date & Time Provider Department Dept. Phone Encounter #  
 8/15/2017 11:40 AM Melissa Gray MD [facility] [phone] [id number] Follow-up Instructions Return in about 3 months (around 11/15/2017). Upcoming Health Maintenance Date Due Hepatitis C Screening 1950 DTaP/Tdap/Td series (1 - Tdap) 4/21/1969 FOBT Q 1 YEAR AGE 50-75 4/21/1998 ZOSTER VACCINE AGE 60> 2/21/2008 GLAUCOMA SCREENING Q2Y 4/21/2013 Pneumococcal 65+ High/Highest Risk (1 of 2 - PCV13) 4/21/2013 MEDICARE YEARLY EXAM 4/21/2013 BREAST CANCER SCRN MAMMOGRAM 2/25/2015 INFLUENZA AGE 9 TO ADULT 8/1/2017 Allergies as of 8/15/2017  Review Complete On: 8/15/2017 By: Melissa Gray MD  
  
 Severity Noted Reaction Type Reactions Adhes. Band-tape-benzalkonium  01/25/2012    Swelling Dairy Aid [Lactase]  04/12/2012    Unknown (comments) Gluten  05/28/2015    Diarrhea \"brain fog\", aching Iodinated Contrast- Oral And Iv Dye  05/28/2015    Other (comments) Gets dizzy and shakey Milk Containing Products  05/28/2015    Other (comments) Aches and stomach cramps Motrin [Ibuprofen]  04/12/2012    Unknown (comments) Other Medication  05/28/2015    Other (comments) Super sensitive to all medication Soy  04/12/2012    Unknown (comments) Sulfa (Sulfonamide Antibiotics)  03/16/2012    Unable to Obtain Wheat  04/12/2012    Unknown (comments) Current Immunizations  Never Reviewed No immunizations on file. Not reviewed this visit You Were Diagnosed With   
  
 Codes Comments Chronic daily headache    -  Primary ICD-10-CM: R01 ICD-9-CM: 784.0 Carpal tunnel syndrome, bilateral upper limbs     ICD-10-CM: G56.03 
ICD-9-CM: 354.0 Chronic fatigue syndrome     ICD-10-CM: R53.82 
ICD-9-CM: 780.71 Insomnia, unspecified type     ICD-10-CM: G47.00 ICD-9-CM: 780.52   
 Acute right-sided low back pain with right-sided sciatica     ICD-10-CM: M54.41 
ICD-9-CM: 724.2, 724.3 Vitals OB Status Smoking Status Postmenopausal Never Smoker Preferred Pharmacy Pharmacy Name Phone [facility]/PHARMACY Andrea Rashid [phone] Your Updated Medication List  
  
   
This list is accurate as of: 8/15/17 12:25 PM.  Always use your most recent med list.  
  
  
  
  
 amoxicillin 500 mg capsule Commonly known as:  AMOXIL  
  
 gabapentin 100 mg capsule Commonly known as:  NEURONTIN Take 1 Cap by mouth nightly. HYDROcodone-acetaminophen 7.5-325 mg per tablet Commonly known as:  NORCO  
  
 ibuprofen 600 mg tablet Commonly known as:  MOTRIN  
  
 MAGNESIUM GLYCINATE (BULK)  
by Does Not Apply route. TIROSINT 150 mcg Cap Generic drug:  Levothyroxine Take  by mouth. Prescriptions Sent to Pharmacy Refills  
 gabapentin (NEURONTIN) 100 mg capsule 5 Sig: Take 1 Cap by mouth nightly. Class: Normal  
 Pharmacy: [facility]/pharmacy [address]  #: [phone] Route: Oral  
  
Follow-up Instructions Return in about 3 months (around 11/15/2017). To-Do List   
 08/16/2017 Imaging:  MRI LUMB SPINE W WO CONT Patient Instructions PRESCRIPTION REFILL POLICY [facility] Statement to Patients April 1, 2014 In an effort to ensure the large volume of patient prescription refills is processed in the most efficient and expeditious manner, we are asking our patients to assist us by calling your Pharmacy for all prescription refills, this will include also your  Mail Order Pharmacy. The pharmacy will contact our office electronically to continue the refill process. Please do not wait until the last minute to call your pharmacy.  We need at least 48 hours (2days) to fill prescriptions. We also encourage you to call your pharmacy before going to  your prescription to make sure it is ready. With regard to controlled substance prescription refill requests (narcotic refills) that need to be picked up at our office, we ask your cooperation by providing us with at least 72 hours (3days) notice that you will need a refill. We will not refill narcotic prescription refill requests after 4:00pm on any weekday, Monday through Thursday, or after 2:00pm on Fridays, or on the weekends. We encourage everyone to explore another way of getting your prescription refill request processed using MVP Vault, our patient web portal through our electronic medical record system. MVP Vault is an efficient and effective way to communicate your medication request directly to the office and  downloadable as an eliz on your smart phone . MVP Vault also features a review functionality that allows you to view your medication list as well as leave messages for your physician. Are you ready to get connected? If so please review the attatched instructions or speak to any of our staff to get you set up right away! Thank you so much for your cooperation. Should you have any questions please contact our Practice Administrator. The Physicians and Staff,  [facility] Patient Instructions/Plans: 
· Carpal Tunnel Syndrome: Exercises Your Care Instructions Here are some examples of typical rehabilitation exercises for your condition. Start each exercise slowly. Ease off the exercise if you start to have pain. Your doctor or your physical or occupational therapist will tell you when you can start these exercises and which ones will work best for you. How to do the exercises Note: When you no longer have pain or numbness, you can do exercises to help prevent carpal tunnel syndrome from coming back.  Do not do any stretch or movement that is uncomfortable or painful. Warm-up stretches 2. Rotate your wrist up, down, and from side to side. Repeat 4 times. 3. Stretch your fingers far apart. Relax them, and then stretch them again. Repeat 4 times. 4. Stretch your thumb by pulling it back gently, holding it, and then releasing it. Repeat 4 times. Prayer stretch 1. Start with your palms together in front of your chest just below your chin. 2. Slowly lower your hands toward your waistline, keeping your hands close to your stomach and your palms together until you feel a mild to moderate stretch under your forearms. 3. Hold for at least 15 to 30 seconds. Repeat 2 to 4 times. Wrist flexor stretch 1. Extend your arm in front of you with your palm up. 2. Bend your wrist, pointing your hand toward the floor. 3. With your other hand, gently bend your wrist farther until you feel a mild to moderate stretch in your forearm. 4. Hold for at least 15 to 30 seconds. Repeat 2 to 4 times. Wrist extensor stretch Repeat steps 1 through 4 of the stretch above, but begin with your extended hand palm down. Follow-up care is a key part of your treatment and safety. Be sure to make and go to all appointments, and call your doctor if you are having problems. It's also a good idea to know your test results and keep a list of the medicines you take. Where can you learn more? Go to http://juliana-david.info/. Enter Q374 in the search box to learn more about \"Carpal Tunnel Syndrome: Exercises. \" Current as of: March 21, 2017 Content Version: 11.3 © 5438-4644 mSnap. Care instructions adapted under license by [employer] (which disclaims liability or warranty for this information). If you have questions about a medical condition or this instruction, always ask your healthcare professional. [address] any warranty or liability for your use of this information. Patient Instructions/Plans: 
Gabapentin (By mouth) Gabapentin (agustin-a-PEN-tin) Treats seizures and pain caused by shingles. Brand Name(s): ACTIVE-PAC with Gabapentin, Convenience Surya, Cyclo/Wanda 10/300 Pack, FusePaq Fanatrex, Wanda-V, Gralise, Neurontin, Adventism-Snelling There may be other brand names for this medicine. When This Medicine Should Not Be Used: This medicine is not right for everyone. Do not use it if you had an allergic reaction to gabapentin. How to Use This Medicine:  
Capsule, Liquid, Tablet · Take your medicine as directed. Your dose may need to be changed several times to find what works best for you. If you have epilepsy, do not allow more than 12 hours to pass between doses. · Capsule: Swallow the capsule whole with plenty of water. Do not open, crush, or chew it. · Gralise® tablet: Swallow the tablet whole . Do not crush, break, or chew it. · Neurontin® tablet: If you break a tablet into 2 pieces, use the second half as your next dose. If you don't use it within 28 days, throw it away. · Measure the oral liquid medicine with a marked measuring spoon, oral syringe, or medicine cup. · This medicine should come with a Medication Guide. Ask your pharmacist for a copy if you do not have one. · Missed dose: Take a dose as soon as you remember. If it is almost time for your next dose, wait until then and take a regular dose. Do not take extra medicine to make up for a missed dose. · Store the medicine in a closed container at room temperature, away from heat, moisture, and direct light. Store the Neurontin® oral liquid in the refrigerator. Do not freeze. Drugs and Foods to Avoid: Ask your doctor or pharmacist before using any other medicine, including over-the-counter medicines, vitamins, and herbal products. · Some medicines can affect how gabapentin works. Tell your doctor if you also use any of the following: ¨ Hydrocodone ¨ Morphine · If you take an antacid, wait at least 2 hours before you take gabapentin. · Tell your doctor if you use anything else that makes you sleepy. Some examples are allergy medicine, narcotic pain medicine, and alcohol. Warnings While Using This Medicine: · Tell your doctor if you are pregnant or breastfeeding, or if you have kidney problems or are receiving dialysis. Tell your doctor if you have a history of depression or mental health problems. · This medicine may increase depression or thoughts of suicide. Tell your doctor right away if you start to feel more depressed or think about hurting yourself. · This medicine may cause a serious allergic reaction called multiorgan hypersensitivity, which can damage organs and be life-threatening. · Do not stop using this medicine suddenly. Your doctor will need to slowly decrease your dose before you stop it completely. If you take this medicine to prevent seizures, your seizures may return or occur more often if you stop this medicine suddenly. · This medicine may make you dizzy or drowsy. Do not drive or do anything else that could be dangerous until you know how this medicine affects you. · Tell any doctor or dentist who treats you that you are using this medicine. This medicine may affect certain medical test results. · Your doctor will check your progress and the effects of this medicine at regular visits. Keep all appointments. · Keep all medicine out of the reach of children. Never share your medicine with anyone. Possible Side Effects While Using This Medicine:  
Call your doctor right away if you notice any of these side effects: · Allergic reaction: Itching or hives, swelling in your face or hands, swelling or tingling in your mouth or throat, chest tightness, trouble breathing · Behavior problems, aggression, restlessness, trouble concentrating, moodiness (especially in children) · Blistering, peeling, red skin rash · Change in how much or how often you urinate, bloody or cloudy urine, 
· Chest pain, fast heartbeat, trouble breathing · Dark urine or pale stools, nausea, vomiting, loss of appetite, stomach pain, yellow skin or eyes · Fever, rash, swollen or tender glands in the neck, armpit, or groin · Problems with coordination, shakiness, unsteadiness · Rapid weight gain, swelling in your hands, ankles, or feet · Unusual moods or behaviors, thoughts of hurting yourself, feeling depressed If you notice these less serious side effects, talk with your doctor: · Dizziness, drowsiness, sleepiness, tiredness If you notice other side effects that you think are caused by this medicine, tell your doctor. Call your doctor for medical advice about side effects. You may report side effects to FDA at 3-437-FDA-7583 © 2017 [address] Matth[address] Carmona Information is for End User's use only and may not be sold, redistributed or otherwise used for commercial purposes. The above information is an  only. It is not intended as medical advice for individual conditions or treatments. Talk to your doctor, nurse or pharmacist before following any medical regimen to see if it is safe and effective for you. Introducing [facility]! Dear Heather Walters: 
Thank you for requesting a ClassPass account. Our records indicate that you already have an active ClassPass account. You can access your account anytime at [email or web address] Did you know that you can access your hospital and ER discharge instructions at any time in ClassPass? You can also review all of your test results from your hospital stay or ER visit. Additional Information If you have questions, please visit the Frequently Asked Questions section of the ClassPass website at [email or web address] Remember, ClassPass is NOT to be used for urgent needs. For medical emergencies, dial 911. Now available from your iPhone and Android! Please provide this summary of care documentation to your next provider. Your primary care clinician is listed as [facility]. If you have any questions after today's visit, please call [phone].

## 2017-08-15 NOTE — PROGRESS NOTES
Patient states she is always lightheaded. She has fallen several times. Patient states she has headaches everyday and is sensitive to light.

## 2017-08-15 NOTE — LETTER
Patient states she is always lightheaded. She has fallen several times. Patient states she has headaches everyday and is sensitive to light. Date:             2017 Name:  Mar George 
:  1950 MRN:  [id number] PCP:  Yanni Godinez MD 
 
Chief Complaint Patient presents with  Dizziness HISTORY OF PRESENT ILLNESS: 
Yara Keller is a 69 y.o., female who presents today for follow up for headaches, dizziness and numbness and tingling in both hands. She was diagnosed with CTS and left ulnar neuropathy. Her main symptom is weakness in the hands, especially on the left. She does have issues with thyroid. She has not been to endocrine for this at this point. She had neuropsych 2008 that was negative. She went off amitriptyline due to weight gain and memory complaints. She does have issues with sleeping. She can't go to sleep or stay asleep. She always has a baseline headache. No nausea. Sound is really sensitive to her as well. She is trying to avoid sugar. She is trying to avoid gluten, etc. 
She is having knee pain and is having issues getting help with this. It is affecting her walking. She is having some low back pain. She will get sciatica on the right side. She does have a history of lumbar stenosis. She had testing done at the [facility] clinic, was told that she doesn't have dementia or ADD, cognitive complaints are probably due to Lyme or mold. Complains of trouble sleeping, has seen sleep medicine and had CPAP adjusted which did not help. Recap: 
EMG/NCS showed bilateral carpal tunnel syndrome, worse on the left as well as left ulnar nerve compression. She knew that she had carpal tunnel in the left hand at least.  Her hands shake when she is holding a notebook or newspaper. She does not notice that she has shaking with holding silverware or cups.   She is very clumsy, she is still very lightheaded and nauseated. This has been going on since December. She initially reports that she is doing PT, then reports that she never set that up. She has done vestibular therapy in the past admits that she saw the benefit when she did it before. she is very weak and tired. She has hypothyroidism, follows with her PCP for that and has been seeing a specialist at [facility]. She previously saw an endocrinologist here [facility], but she reports that she thinks she stopped seeing him because he would not check her T3 and T4 so she went to [facility] instead. She was referred to [address] [place] [address] in January for her dizziness and gait, does not think that they called to set it up. She drops her car keys. She can't  her feet very well, is tripping over sidewalks. She can't scan her email because moving to follow the screen makes her dizzy. 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 Current Outpatient Prescriptions Medication Sig  
 HYDROcodone-acetaminophen (NORCO) 7.5-325 mg per tablet  ibuprofen (MOTRIN) 600 mg tablet  amoxicillin (AMOXIL) 500 mg capsule  MAGNESIUM GLYCINATE, BULK, by Does Not Apply route.  Levothyroxine (TIROSINT) 150 mcg cap Take  by mouth.  amitriptyline (ELAVIL) 10 mg tablet Take 1 Tab by mouth nightly.  tolnaftate (TINACTIN) 1 % external solution Apply  to affected area nightly.  CHOLECALCIFEROL, VITD3,/VIT K2 (VITAMIN D3-VITAMIN K2 PO) Take  by mouth.  iodine (IODINE STRONG) liqd Apply  to affected area as needed.  OTHER,NON-FORMULARY, Black cumin seed oil:500mg once daily  OTHER,NON-FORMULARY, Berberine: 200mmg BID  
 OTHER,NON-FORMULARY, MTHFR L6494E+: 2 drops once daily  OTHER,NON-FORMULARY, Meriva: 500mg, 1000mg BID  
 OTHER,NON-FORMULARY, MTR/MTRR+: 2 drops daily No current facility-administered medications for this visit. Allergies Allergen Reactions  Adhes. Band-Tape-Benzalkonium Swelling  Dairy Aid [Lactase] Unknown (comments)  Gluten Diarrhea \"brain fog\", aching  Iodinated Contrast- Oral And Iv Dye Other (comments) Gets dizzy and shakey  Milk Containing Products Other (comments) Aches and stomach cramps  Motrin [Ibuprofen] Unknown (comments)  Other Medication Other (comments) Super sensitive to all medication  Soy Unknown (comments)  Sulfa (Sulfonamide Antibiotics) Unable to Obtain  Wheat Unknown (comments) Past Medical History:  
Diagnosis Date  Chest pain, unspecified  Chronic fatigue syndrome 3/16/2012  Fibromyalgia  Impaired glucose tolerance  Other dyspnea and respiratory abnormality  Palpitations  Shortness of breath  Skin cancer 3/16/2012  Unspecified hypothyroidism   
 goiter  Unspecified vitamin D deficiency Past Surgical History:  
Procedure Laterality Date  HX BREAST LUMPECTOMY  7/2013  
 right breast  
 HX TONSILLECTOMY  HX TONSILLECTOMY  HX UROLOGICAL    
 urethral opening was widened Social History Social History  Marital status:  Spouse name: N/A  
 Number of children: N/A  
 Years of education: N/A Occupational History  Not on file. Social History Main Topics  Smoking status: Never Smoker  Smokeless tobacco: Never Used  Alcohol use No  
   Comment: Rare  Drug use: No  
 Sexual activity: Yes  
  Partners: Male Other Topics Concern  Not on file Social History Narrative Lives in [facility] with . Used to work as a  for [employer]. Family History Problem Relation Age of Onset  Coronary Artery Disease Mother  Thyroid Disease Mother  Diabetes Mother  Cancer Mother Multiple Myeloma  Cancer Father Colon cancer  Arthritis-osteo Father  Heart Disease Father  High Cholesterol Brother  Thyroid Disease Brother   
  thyroid cancer  Cancer Brother Thyroid and Skin  Diabetes Maternal Aunt  Hypertension Other  Heart Disease Other  Depression Other  Anxiety Other  Cancer Sister Skin  Cancer Brother Multiple Myeloma and skin  Cancer Brother Skin PHYSICAL EXAMINATION:   
There were no vitals taken for this visit. General:  Well defined, nourished, and groomed individual in no acute distress. Neck: Supple, nontender, no bruits, no pain with resistance to active range of motion. Heart: Regular rate and rhythm, no murmurs, rub, or gallop. Normal S1S2. Lungs:  Clear to auscultation bilaterally with equal chest expansion, no cough, no wheeze Musculoskeletal:  Extremities revealed no edema and had full range of motion of joints. Psych:  Good mood and bright affect NEUROLOGICAL EXAMINATION:    
Mental Status:   Alert and oriented to person, place, and time with recent and remote memory intact. Attention span and concentration are normal. Speech is fluent with a full fund of knowledge. Cranial Nerves:   
II, III, IV, VI:  Visual acuity grossly intact. Pupils are equal, round, and reactive to light. Extra-ocular movements are full and fluid. No ptosis or nystagmus. V-XII: Hearing is grossly intact. Facial features are symmetric, with normal sensation and strength. The palate rises symmetrically and the tongue protrudes midline. Sternocleidomastoids 5/5. Motor Examination: Normal tone, bulk, and strength, 5/5 on left, 4/5 on right, 4/5 bilateral IHM Coordination:  Finger to nose testing was normal.   No resting or intention tremor Gait and Station:  Wide based ASSESSMENT AND PLAN 
 
26-year-old female seen in follow-up headaches, carpal tunnel syndrome, and insomnia. Patient also continues with cognitive complaints.   She is now off of amitriptyline. She is still having issues. Today she is also complaining of low back pain and sciatica of the right leg. She does have a history of disc disease in the back. No recent imaging of this. 1.  Carpal tunnel exercises given. Patient is not interested in surgery or steroid injections 2. Encourage patient to reestablish with endocrine 3. Start gabapentin 100 mg nightly to help with RLS, insomnia, and daily headache 4. Discussed that we can do referral for neuropsychological testing, but we will wait to see if patient improves with medication adjustment 5. MRI of the lumbar spine to evaluate for worsening disc disease given patient's new onset of right leg sciatica Follow-up in 3 months Yamil Vega MD 
 
This note was created using voice recognition software. Despite editing, there may be syntax errors. This note will not be viewable in [address]

## 2017-08-15 NOTE — PROGRESS NOTES
Date:             2017    Name:  Tyra Boyle  :  1950  MRN:  [id number]     PCP:  Shimon Gonzales MD    Chief Complaint   Patient presents with    Dizziness         HISTORY OF PRESENT ILLNESS:  Fuentes Cat is a 69 y.o., female who presents today for follow up for headaches, dizziness and numbness and tingling in both hands. She was diagnosed with CTS and left ulnar neuropathy. Her main symptom is weakness in the hands, especially on the left. She does have issues with thyroid. She has not been to endocrine for this at this point. She had neuropsych 2008 that was negative. She went off amitriptyline due to weight gain and memory complaints. She does have issues with sleeping. She can't go to sleep or stay asleep. She always has a baseline headache. No nausea. Sound is really sensitive to her as well. She is trying to avoid sugar. She is trying to avoid gluten, etc.  She is having knee pain and is having issues getting help with this. It is affecting her walking. She is having some low back pain. She will get sciatica on the right side. She does have a history of lumbar stenosis. She had testing done at the [facility] clinic, was told that she doesn't have dementia or ADD, cognitive complaints are probably due to Lyme or mold. Complains of trouble sleeping, has seen sleep medicine and had CPAP adjusted which did not help. Recap:  EMG/NCS showed bilateral carpal tunnel syndrome, worse on the left as well as left ulnar nerve compression. She knew that she had carpal tunnel in the left hand at least.  Her hands shake when she is holding a notebook or newspaper. She does not notice that she has shaking with holding silverware or cups. She is very clumsy, she is still very lightheaded and nauseated. This has been going on since December. She initially reports that she is doing PT, then reports that she never set that up.   She has done vestibular therapy in the past admits that she saw the benefit when she did it before. she is very weak and tired. She has hypothyroidism, follows with her PCP for that and has been seeing a specialist at [facility]. She previously saw an endocrinologist here [facility], but she reports that she thinks she stopped seeing him because he would not check her T3 and T4 so she went to [facility] instead. She was referred to [address] [place] [address] in January for her dizziness and gait, does not think that they called to set it up. She drops her car keys. She can't  her feet very well, is tripping over sidewalks. She can't scan her email because moving to follow the screen makes her dizzy. 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 Current Outpatient Prescriptions   Medication Sig    HYDROcodone-acetaminophen (NORCO) 7.5-325 mg per tablet     ibuprofen (MOTRIN) 600 mg tablet     amoxicillin (AMOXIL) 500 mg capsule     MAGNESIUM GLYCINATE, BULK, by Does Not Apply route.  Levothyroxine (TIROSINT) 150 mcg cap Take  by mouth.  amitriptyline (ELAVIL) 10 mg tablet Take 1 Tab by mouth nightly.  tolnaftate (TINACTIN) 1 % external solution Apply  to affected area nightly.  CHOLECALCIFEROL, VITD3,/VIT K2 (VITAMIN D3-VITAMIN K2 PO) Take  by mouth.  iodine (IODINE STRONG) liqd Apply  to affected area as needed.  OTHER,NON-FORMULARY, Black cumin seed oil:500mg once daily    OTHER,NON-FORMULARY, Berberine: 200mmg BID    OTHER,NON-FORMULARY, MTHFR [id number]+: 2 drops once daily    OTHER,NON-FORMULARY, Meriva: 500mg, 1000mg BID    OTHER,NON-FORMULARY, MTR/MTRR+: 2 drops daily     No current facility-administered medications for this visit. Allergies   Allergen Reactions    Adhes.  Band-Tape-Benzalkonium Swelling    Dairy Aid [Lactase] Unknown (comments)    Gluten Diarrhea     \"brain fog\", aching    Iodinated Contrast- Oral And Iv Dye Other (comments)     Gets dizzy and shakey    Milk Containing Products Other (comments)     Aches and stomach cramps    Motrin [Ibuprofen] Unknown (comments)    Other Medication Other (comments)     Super sensitive to all medication    Soy Unknown (comments)    Sulfa (Sulfonamide Antibiotics) Unable to Obtain    Wheat Unknown (comments)     Past Medical History:   Diagnosis Date    Chest pain, unspecified     Chronic fatigue syndrome 3/16/2012    Fibromyalgia     Impaired glucose tolerance     Other dyspnea and respiratory abnormality     Palpitations     Shortness of breath     Skin cancer 3/16/2012    Unspecified hypothyroidism     goiter    Unspecified vitamin D deficiency      Past Surgical History:   Procedure Laterality Date    HX BREAST LUMPECTOMY  7/2013    right breast    HX TONSILLECTOMY      HX TONSILLECTOMY      HX UROLOGICAL      urethral opening was widened     Social History     Social History    Marital status:      Spouse name: N/A    Number of children: N/A    Years of education: N/A     Occupational History    Not on file. Social History Main Topics    Smoking status: Never Smoker    Smokeless tobacco: Never Used    Alcohol use No      Comment: Rare    Drug use: No    Sexual activity: Yes     Partners: Male     Other Topics Concern    Not on file     Social History Narrative    Lives in [facility] with . Used to work as a  for [employer].      Family History   Problem Relation Age of Onset    Coronary Artery Disease Mother     Thyroid Disease Mother     Diabetes Mother     Cancer Mother      Multiple Myeloma    Cancer Father      Colon cancer    Arthritis-osteo Father     Heart Disease Father     High Cholesterol Brother     Thyroid Disease Brother      thyroid cancer    Cancer Brother      Thyroid and Skin    Diabetes Maternal Aunt     Hypertension Other     Heart Disease Other     Depression Other     Anxiety Other     Cancer Sister      Skin    Cancer Brother      Multiple Myeloma and skin    Cancer Brother      Skin         PHYSICAL EXAMINATION:    There were no vitals taken for this visit. General:  Well defined, nourished, and groomed individual in no acute distress. Neck: Supple, nontender, no bruits, no pain with resistance to active range of motion. Heart: Regular rate and rhythm, no murmurs, rub, or gallop. Normal S1S2. Lungs:  Clear to auscultation bilaterally with equal chest expansion, no cough, no wheeze  Musculoskeletal:  Extremities revealed no edema and had full range of motion of joints. Psych:  Good mood and bright affect    NEUROLOGICAL EXAMINATION:     Mental Status:   Alert and oriented to person, place, and time with recent and remote memory intact. Attention span and concentration are normal. Speech is fluent with a full fund of knowledge. Cranial Nerves:    II, III, IV, VI:  Visual acuity grossly intact. Pupils are equal, round, and reactive to light. Extra-ocular movements are full and fluid. No ptosis or nystagmus. V-XII: Hearing is grossly intact. Facial features are symmetric, with normal sensation and strength. The palate rises symmetrically and the tongue protrudes midline. Sternocleidomastoids 5/5. Motor Examination: Normal tone, bulk, and strength, 5/5 on left, 4/5 on right, 4/5 bilateral IHM  Coordination:  Finger to nose testing was normal.   No resting or intention tremor  Gait and Station:  Wide based      ASSESSMENT AND PLAN    44year-old female seen in follow-up headaches, carpal tunnel syndrome, and insomnia. Patient also continues with cognitive complaints. She is now off of amitriptyline. She is still having issues. Today she is also complaining of low back pain and sciatica of the right leg. She does have a history of disc disease in the back. No recent imaging of this. 1.  Carpal tunnel exercises given. Patient is not interested in surgery or steroid injections  2. Encourage patient to reestablish with endocrine  3. Start gabapentin 100 mg nightly to help with RLS, insomnia, and daily headache  4. Discussed that we can do referral for neuropsychological testing, but we will wait to see if patient improves with medication adjustment  5. MRI of the lumbar spine to evaluate for worsening disc disease given patient's new onset of right leg sciatica      Follow-up in 3 months    Marion Gayle MD    This note was created using voice recognition software. Despite editing, there may be syntax errors. This note will not be viewable in [address]

## 2017-08-25 ENCOUNTER — DOCUMENTATION ONLY (OUTPATIENT)
Dept: NEUROLOGY | Age: 67
End: 2017-08-25

## 2017-08-25 NOTE — PROGRESS NOTES
Received MRI of the lumbar spine. This was done in [place]. There was no provided CD to evaluate the images myself. Report impression:  1. Normal lumbar spine alignment. 2.  Multilevel degenerative disc disease with significant disc bulges at L3/4 and L4/5. Small right posterior lateral disc extrusion at L4/5  3. Right-sided neuroforaminal narrowing at L3 through 5. Extruded disc material contacts the exiting nerve root at L4/5.  4.  No significant spinal canal narrowing  5.   Multilevel facet arthropathy

## 2017-08-28 ENCOUNTER — TELEPHONE (OUTPATIENT)
Dept: NEUROLOGY | Age: 67
End: 2017-08-28

## 2017-08-28 NOTE — TELEPHONE ENCOUNTER
----- Message from Yash Cornejo sent at 8/28/2017 10:11 AM EDT -----  Regarding: /Telephone  Pt inquiring about results of MRI. Please give a call. Best contact [phone].

## 2017-08-28 NOTE — TELEPHONE ENCOUNTER
Returned call to patient. Explained to her that we do not have the results from her MRI yet. She told me that she had the imaging outside of [facility]. I suggested to the patient she can contact where she had it done and request a copy of the results. Informed her that we will contact her if there is an abnormal result once we have the report otherwise we will review it with her at her next appointment. Patient voiced understanding and appreciation.

## 2017-09-13 ENCOUNTER — TELEPHONE (OUTPATIENT)
Dept: SLEEP MEDICINE | Age: 67
End: 2017-09-13

## 2017-09-13 NOTE — TELEPHONE ENCOUNTER
Patient called stating she is having mask leaks while sleeping and she would like to speak with someone before she leaves to go out of the country tomorrow. She can be reached at [phone].

## 2017-09-13 NOTE — TELEPHONE ENCOUNTER
Call placed to patient . Patient did not answer a message was left for Mrs. Rodriguez  to return our call at her earliest convenience.

## 2017-09-14 NOTE — TELEPHONE ENCOUNTER
Patient called back and spoke with Lead Technologist. Patients  current issues were discussed and addressed. Patient will stay at current setting as she is traveling out of the country if anything changes she will give us a call upon her return.

## 2018-01-11 ENCOUNTER — TELEPHONE (OUTPATIENT)
Dept: SLEEP MEDICINE | Age: 68
End: 2018-01-11

## 2018-01-11 DIAGNOSIS — G47.33 OBSTRUCTIVE SLEEP APNEA SYNDROME: Primary | ICD-10-CM

## 2018-01-17 ENCOUNTER — DOCUMENTATION ONLY (OUTPATIENT)
Dept: SLEEP MEDICINE | Age: 68
End: 2018-01-17

## 2018-02-02 ENCOUNTER — OFFICE VISIT (OUTPATIENT)
Dept: NEUROLOGY | Age: 68
End: 2018-02-02

## 2018-02-02 VITALS — SYSTOLIC BLOOD PRESSURE: 160 MMHG | DIASTOLIC BLOOD PRESSURE: 75 MMHG | OXYGEN SATURATION: 98 % | HEART RATE: 83 BPM

## 2018-02-02 DIAGNOSIS — M48.062 LUMBAR STENOSIS WITH NEUROGENIC CLAUDICATION: Primary | ICD-10-CM

## 2018-02-02 RX ORDER — LEVOTHYROXINE SODIUM 75 UG/1
CAPSULE ORAL
COMMUNITY
Start: 2018-01-12 | End: 2018-05-13

## 2018-02-02 RX ORDER — GABAPENTIN 100 MG/1
100 CAPSULE ORAL
Qty: 30 CAP | Refills: 5 | Status: SHIPPED | OUTPATIENT
Start: 2018-02-02 | End: 2018-05-13

## 2018-02-02 NOTE — MR AVS SNAPSHOT
[address] [facility] [address] [facility] [id number] [phone] Patient: Mackenzie Golden MRN: [id number] JDT:[id number]/38/[id number] Visit Information Date & Time Provider Department Dept. Phone Encounter #  
 2/2/2018  1:40 PM [facility] [phone] [id number] Follow-up Instructions Return in about 3 months (around 5/2/2018). Your Appointments 4/19/2018  3:00 PM  
Any with Dinora Saldana MD  
[address] [facility] Appt Note: 1 year f/up- bring machine [address] [facility] [address] [phone] [address] [place] [address]., [address] [phone] Upcoming Health Maintenance Date Due Hepatitis C Screening 1950 DTaP/Tdap/Td series (1 - Tdap) 4/21/1969 FOBT Q 1 YEAR AGE 50-75 4/21/1998 ZOSTER VACCINE AGE 60> 2/21/2008 GLAUCOMA SCREENING Q2Y 4/21/2013 Pneumococcal 65+ High/Highest Risk (1 of 2 - PCV13) 4/21/2013 MEDICARE YEARLY EXAM 4/21/2013 BREAST CANCER SCRN MAMMOGRAM 2/25/2015 Influenza Age 5 to Adult 8/1/2017 Allergies as of 2/2/2018  Review Complete On: 2/2/2018 By: Karen Corrales Severity Noted Reaction Type Reactions Adhes. Band-tape-benzalkonium  01/25/2012    Swelling Dairy Aid [Lactase]  04/12/2012    Unknown (comments) Gluten  05/28/2015    Diarrhea \"brain fog\", aching Iodinated Contrast- Oral And Iv Dye  05/28/2015    Other (comments) Gets dizzy and shakey Milk Containing Products  05/28/2015    Other (comments) Aches and stomach cramps Motrin [Ibuprofen]  04/12/2012    Unknown (comments) Other Medication  05/28/2015    Other (comments) Super sensitive to all medication Soy  04/12/2012    Unknown (comments) Sulfa (Sulfonamide Antibiotics)  03/16/2012    Unable to Obtain Wheat  04/12/2012    Unknown (comments) Current Immunizations  Never Reviewed No immunizations on file. Not reviewed this visit You Were Diagnosed With   
  
 Codes Comments Lumbar stenosis with neurogenic claudication    -  Primary ICD-10-CM: M19.598 
ICD-9-CM: 724.03 Vitals BP Pulse SpO2 OB Status Smoking Status 160/75 83 98% Postmenopausal Never Smoker Vitals History Preferred Pharmacy Pharmacy Name Phone [address] [place] [address], [address] [place] [address] Nadeen Beckwith [phone] Your Updated Medication List  
  
   
This list is accurate as of: 2/2/18  2:11 PM.  Always use your most recent med list.  
  
  
  
  
 amoxicillin 500 mg capsule Commonly known as:  AMOXIL  
  
 gabapentin 100 mg capsule Commonly known as:  NEURONTIN Take 1 Cap by mouth nightly. HYDROcodone-acetaminophen 7.5-325 mg per tablet Commonly known as:  NORCO  
  
 ibuprofen 600 mg tablet Commonly known as:  MOTRIN  
  
 MAGNESIUM GLYCINATE (BULK)  
by Does Not Apply route. PROBIOTIC PO Take  by mouth. * TIROSINT 150 mcg Cap Generic drug:  Levothyroxine Take  by mouth. * TIROSINT 75 mcg Cap Generic drug:  Levothyroxine * Notice: This list has 2 medication(s) that are the same as other medications prescribed for you. Read the directions carefully, and ask your doctor or other care provider to review them with you. Prescriptions Sent to Pharmacy Refills  
 gabapentin (NEURONTIN) 100 mg capsule 5 Sig: Take 1 Cap by mouth nightly. Class: Normal  
 Pharmacy: [facility] [address] [place] [address] Ph #: [phone] Route: Oral  
  
We Performed the Following REFERRAL TO NEUROSURGERY [FEV46 Custom] Comments:  
 Lumbar stenosis. Please eval if surgery may be needed. Pt is in physical therapy currently. Follow-up Instructions Return in about 3 months (around 5/2/2018). Referral Information Referral ID Referred By Referred To  
  
 [id number] Ruchi Bhatt MD   
   [address], [address] Phone: [phone] Fax: [phone] Visits Status Start Date End Date 1 New Request 2/2/18 2/2/19 If your referral has a status of pending review or denied, additional information will be sent to support the outcome of this decision. Patient Instructions Renard Peña 0969 What is a living will? A living will is a legal form you use to write down the kind of care you want at the end of your life. It is used by the health professionals who will treat you if you aren't able to decide for yourself. If you put your wishes in writing, your loved ones and others will know what kind of care you want. They won't need to guess. This can ease your mind and be helpful to others. A living will is not the same as an estate or property will. An estate will explains what you want to happen with your money and property after you die. Is a living will a legal document? A living will is a legal document. Each state has its own laws about living [place]. If you move to another state, make sure that your living will is legal in the state where you now live. Or you might use a universal form that has been approved by many states. This kind of form can sometimes be completed and stored online. Your electronic copy will then be available wherever you have a connection to the Internet. In most cases, doctors will respect your wishes even if you have a form from a different state. · You don't need an  to complete a living will. But legal advice can be helpful if your state's laws are unclear, your health history is complicated, or your family can't agree on what should be in your living will. · You can change your living will at any time.  Some people find that their wishes about end-of-life care change as their health changes. · In addition to making a living will, think about completing a medical power of  form. This form lets you name the person you want to make end-of-life treatment decisions for you (your \"health care agent\") if you're not able to. Many hospitals and nursing homes will give you the forms you need to complete a living will and a medical power of . · Your living will is used only if you can't make or communicate decisions for yourself anymore. If you become able to make decisions again, you can accept or refuse any treatment, no matter what you wrote in your living will. · Your state may offer an online registry. This is a place where you can store your living will online so the doctors and nurses who need to treat you can find it right away. What should you think about when creating a living will? Talk about your end-of-life wishes with your family members and your doctor. Let them know what you want. That way the people making decisions for you won't be surprised by your choices. Think about these questions as you make your living will: · Do you know enough about life support methods that might be used? If not, talk to your doctor so you know what might be done if you can't breathe on your own, your heart stops, or you're unable to swallow. · What things would you still want to be able to do after you receive life-support methods? Would you want to be able to walk? To speak? To eat on your own? To live without the help of machines? · If you have a choice, where do you want to be cared for? In your home? At a hospital or nursing home? · Do you want certain Rastafarian practices performed if you become very ill? · If you have a choice at the end of your life, where would you prefer to die? At home? In a hospital or nursing home? Somewhere else? · Would you prefer to be buried or cremated? · Do you want your organs to be donated after you die? What should you do with your living will? · Make sure that your family members and your health care agent have copies of your living will. · Give your doctor a copy of your living will to keep in your medical record. If you have more than one doctor, make sure that each one has a copy. · You may want to put a copy of your living will where it can be easily found. Where can you learn more? Go to http://juliana-david.info/. Enter D324 in the search box to learn more about \"Learning About Living Perroy. \" Current as of: September 24, 2016 Content Version: 11.4 © 1145-5194 [employer] Learning. Care instructions adapted under license by [employer] (which disclaims liability or warranty for this information). If you have questions about a medical condition or this instruction, always ask your healthcare professional. [place] [address] any warranty or liability for your use of this information. PRESCRIPTION REFILL POLICY [facility] Statement to Patients April 1, 2014 In an effort to ensure the large volume of patient prescription refills is processed in the most efficient and expeditious manner, we are asking our patients to assist us by calling your Pharmacy for all prescription refills, this will include also your  Mail Order Pharmacy. The pharmacy will contact our office electronically to continue the refill process. Please do not wait until the last minute to call your pharmacy. We need at least 48 hours (2days) to fill prescriptions. We also encourage you to call your pharmacy before going to  your prescription to make sure it is ready.   
 
With regard to controlled substance prescription refill requests (narcotic refills) that need to be picked up at our office, we ask your cooperation by providing us with at least 72 hours (3days) notice that you will need a refill. We will not refill narcotic prescription refill requests after 4:00pm on any weekday, Monday through Thursday, or after 2:00pm on Fridays, or on the weekends. We encourage everyone to explore another way of getting your prescription refill request processed using Blued, our patient web portal through our electronic medical record system. Blued is an efficient and effective way to communicate your medication request directly to the office and  downloadable as an eliz on your smart phone . Blued also features a review functionality that allows you to view your medication list as well as leave messages for your physician. Are you ready to get connected? If so please review the attatched instructions or speak to any of our staff to get you set up right away! Thank you so much for your cooperation. Should you have any questions please contact our Practice Administrator. The Physicians and Staff,  [address] [place] [address] Neurology Clinic If we have ordered testing for you, we typically do not call patients with results. Your doctor or nurse will contact you if there are critical results that need to be addressed before your next appointment. We also schedule follow up appointments so that your results can be discussed in person and any questions you have regarding them may be addressed. Additionally, results may be found by using the My Chart feature and one of our patient service representatives at the  can give you instructions on how to access this feature of our electronic medical record system. Introducing [facility]! Dear Madison Lamb: 
Thank you for requesting a Blued account. Our records indicate that you already have an active Blued account. You can access your account anytime at [email or web address] Did you know that you can access your hospital and ER discharge instructions at any time in Blued?   You can also review all of your test results from your hospital stay or ER visit. Additional Information If you have questions, please visit the Frequently Asked Questions section of the Otogami website at [email or web address] Remember, Otogami is NOT to be used for urgent needs. For medical emergencies, dial 911. Now available from your iPhone and Android! Please provide this summary of care documentation to your next provider. Your primary care clinician is listed as [facility]. If you have any questions after today's visit, please call [phone].

## 2018-02-02 NOTE — PATIENT INSTRUCTIONS
Learning About Living Antonino Roberts  What is a living will? A living will is a legal form you use to write down the kind of care you want at the end of your life. It is used by the health professionals who will treat you if you aren't able to decide for yourself. If you put your wishes in writing, your loved ones and others will know what kind of care you want. They won't need to guess. This can ease your mind and be helpful to others. A living will is not the same as an estate or property will. An estate will explains what you want to happen with your money and property after you die. Is a living will a legal document? A living will is a legal document. Each state has its own laws about living [place]. If you move to another state, make sure that your living will is legal in the state where you now live. Or you might use a universal form that has been approved by many states. This kind of form can sometimes be completed and stored online. Your electronic copy will then be available wherever you have a connection to the Internet. In most cases, doctors will respect your wishes even if you have a form from a different state. · You don't need an  to complete a living will. But legal advice can be helpful if your state's laws are unclear, your health history is complicated, or your family can't agree on what should be in your living will. · You can change your living will at any time. Some people find that their wishes about end-of-life care change as their health changes. · In addition to making a living will, think about completing a medical power of  form. This form lets you name the person you want to make end-of-life treatment decisions for you (your \"health care agent\") if you're not able to. Many hospitals and nursing homes will give you the forms you need to complete a living will and a medical power of .   · Your living will is used only if you can't make or communicate decisions for yourself anymore. If you become able to make decisions again, you can accept or refuse any treatment, no matter what you wrote in your living will. · Your state may offer an online registry. This is a place where you can store your living will online so the doctors and nurses who need to treat you can find it right away. What should you think about when creating a living will? Talk about your end-of-life wishes with your family members and your doctor. Let them know what you want. That way the people making decisions for you won't be surprised by your choices. Think about these questions as you make your living will:  · Do you know enough about life support methods that might be used? If not, talk to your doctor so you know what might be done if you can't breathe on your own, your heart stops, or you're unable to swallow. · What things would you still want to be able to do after you receive life-support methods? Would you want to be able to walk? To speak? To eat on your own? To live without the help of machines? · If you have a choice, where do you want to be cared for? In your home? At a hospital or nursing home? · Do you want certain Judaism practices performed if you become very ill? · If you have a choice at the end of your life, where would you prefer to die? At home? In a hospital or nursing home? Somewhere else? · Would you prefer to be buried or cremated? · Do you want your organs to be donated after you die? What should you do with your living will? · Make sure that your family members and your health care agent have copies of your living will. · Give your doctor a copy of your living will to keep in your medical record. If you have more than one doctor, make sure that each one has a copy. · You may want to put a copy of your living will where it can be easily found. Where can you learn more? Go to http://juliana-david.info/.   Enter K858 in the search box to learn more about \"Learning About Living Yared. \"  Current as of: September 24, 2016  Content Version: 11.4  © 9619-3141 Healthwise, Incorporated. Care instructions adapted under license by [employer] (which disclaims liability or warranty for this information). If you have questions about a medical condition or this instruction, always ask your healthcare professional. [address] any warranty or liability for your use of this information. [address] [facility]   Statement to Patients  April 1, 2014      In an effort to ensure the large volume of patient prescription refills is processed in the most efficient and expeditious manner, we are asking our patients to assist us by calling your Pharmacy for all prescription refills, this will include also your  Mail Order Pharmacy. The pharmacy will contact our office electronically to continue the refill process. Please do not wait until the last minute to call your pharmacy. We need at least 48 hours (2days) to fill prescriptions. We also encourage you to call your pharmacy before going to  your prescription to make sure it is ready. With regard to controlled substance prescription refill requests (narcotic refills) that need to be picked up at our office, we ask your cooperation by providing us with at least 72 hours (3days) notice that you will need a refill. We will not refill narcotic prescription refill requests after 4:00pm on any weekday, Monday through Thursday, or after 2:00pm on Fridays, or on the weekends. We encourage everyone to explore another way of getting your prescription refill request processed using Vouch, our patient web portal through our electronic medical record system. Vouch is an efficient and effective way to communicate your medication request directly to the office and  downloadable as an eliz on your smart phone .  Vouch also features a review functionality that allows you to view your medication list as well as leave messages for your physician. Are you ready to get connected? If so please review the attatched instructions or speak to any of our staff to get you set up right away! Thank you so much for your cooperation. Should you have any questions please contact our Practice Administrator. The Physicians and Staff,  [facility]     If we have ordered testing for you, we typically do not call patients with results. Your doctor or nurse will contact you if there are critical results that need to be addressed before your next appointment. We also schedule follow up appointments so that your results can be discussed in person and any questions you have regarding them may be addressed. Additionally, results may be found by using the My Chart feature and one of our patient service representatives at the  can give you instructions on how to access this feature of our electronic medical record system.

## 2018-02-02 NOTE — PROGRESS NOTES
Name:  Verla Babinski  :  1950  MRN:  [id number]     PCP:  Leah Parikh MD    Chief Complaint   Patient presents with    Dizziness         HISTORY OF PRESENT ILLNESS:  Doroteo Lemos is a 69 y.o., female who presents today for follow up for headaches, dizziness and numbness and tingling in both hands. She is still having some sciatic pain. This is mostly on the right side. Changing positions when she has pain is helpful. She had an MRI L spine with several discs at L4-5. She also has some right sided foraminal narrowing. She doesn't like to lie on her back. She does have stiffness from her fibromyalgia. She is going to PT again to help her. She is doing twice a week for this. It is focused on her right leg and back. She has her knee taped and this dose help her. She follows with rheumatology. She does have RLS. She does stretching exercises for her fibro. She is using a electric carts when she goes places due to weakness and pains. She is not resting well secondary to pain. Recap; She was diagnosed with CTS and left ulnar neuropathy. Her main symptom is weakness in the hands, especially on the left. She does have issues with thyroid. She has not been to endocrine for this at this point. She had neuropsych 2008 that was negative. She went off amitriptyline due to weight gain and memory complaints. She does have issues with sleeping. She can't go to sleep or stay asleep. She always has a baseline headache. No nausea. Sound is really sensitive to her as well. She is trying to avoid sugar. She is trying to avoid gluten, etc.  She is having knee pain and is having issues getting help with this. It is affecting her walking. She is having some low back pain. She will get sciatica on the right side. She does have a history of lumbar stenosis.      She had testing done at the [facility] clinic, was told that she doesn't have dementia or ADD, cognitive complaints are probably due to Lyme or mold. Complains of trouble sleeping, has seen sleep medicine and had CPAP adjusted which did not help. Imaging:  MRI lumbar spine: Focal areas of disc disease with disc extrusion at L4/5 (I personally reviewed these images in provided CD and this is my impression)       Current Outpatient Prescriptions   Medication Sig    TIROSINT 75 mcg cap     LACTOBACILLUS ACIDOPHILUS (PROBIOTIC PO) Take  by mouth.  MAGNESIUM GLYCINATE, BULK, by Does Not Apply route.  HYDROcodone-acetaminophen (NORCO) 7.5-325 mg per tablet     ibuprofen (MOTRIN) 600 mg tablet     amoxicillin (AMOXIL) 500 mg capsule     gabapentin (NEURONTIN) 100 mg capsule Take 1 Cap by mouth nightly.  Levothyroxine (TIROSINT) 150 mcg cap Take  by mouth. No current facility-administered medications for this visit. Allergies   Allergen Reactions    Adhes.  Band-Tape-Benzalkonium Swelling    Dairy Aid [Lactase] Unknown (comments)    Gluten Diarrhea     \"brain fog\", aching    Iodinated Contrast- Oral And Iv Dye Other (comments)     Gets dizzy and shakey    Milk Containing Products Other (comments)     Aches and stomach cramps    Motrin [Ibuprofen] Unknown (comments)    Other Medication Other (comments)     Super sensitive to all medication    Soy Unknown (comments)    Sulfa (Sulfonamide Antibiotics) Unable to Obtain    Wheat Unknown (comments)     Past Medical History:   Diagnosis Date    Chest pain, unspecified     Chronic fatigue syndrome 3/16/2012    Fibromyalgia     Impaired glucose tolerance     Other dyspnea and respiratory abnormality     Palpitations     Shortness of breath     Skin cancer 3/16/2012    Unspecified hypothyroidism     goiter    Unspecified vitamin D deficiency      Past Surgical History:   Procedure Laterality Date    HX BREAST LUMPECTOMY  7/2013    right breast    HX TONSILLECTOMY      HX TONSILLECTOMY      HX UROLOGICAL      urethral opening was widened     Social History     Social History    Marital status:      Spouse name: N/A    Number of children: N/A    Years of education: N/A     Occupational History    Not on file. Social History Main Topics    Smoking status: Never Smoker    Smokeless tobacco: Never Used    Alcohol use No      Comment: Rare    Drug use: No    Sexual activity: Yes     Partners: Male     Other Topics Concern    Not on file     Social History Narrative    Lives in [facility] with . Used to work as a  for [employer]. Family History   Problem Relation Age of Onset    Coronary Artery Disease Mother     Thyroid Disease Mother     Diabetes Mother     Cancer Mother      Multiple Myeloma    Cancer Father      Colon cancer    Arthritis-osteo Father     Heart Disease Father     High Cholesterol Brother     Thyroid Disease Brother      thyroid cancer    Cancer Brother      Thyroid and Skin    Diabetes Maternal Aunt     Hypertension Other     Heart Disease Other     Depression Other     Anxiety Other     Cancer Sister      Skin    Cancer Brother      Multiple Myeloma and skin    Cancer Brother      Skin         PHYSICAL EXAMINATION:    Visit Vitals    /75    Pulse 83    SpO2 98%     General:  Well defined, nourished, and groomed individual in no acute distress. Neck: Supple, nontender, no bruits, no pain with resistance to active range of motion. Heart: Regular rate and rhythm, no murmurs, rub, or gallop. Normal S1S2. Lungs:  Clear to auscultation bilaterally with equal chest expansion, no cough, no wheeze  Musculoskeletal:  Extremities revealed no edema and had full range of motion of joints. Psych: Normal    NEUROLOGICAL EXAMINATION:     Mental Status:   Alert and oriented to person, place, and time with recent and remote memory intact. Attention span and concentration are normal. Speech is fluent with a full fund of knowledge.       Cranial Nerves:    II, III, IV, VI:  Visual acuity grossly intact. Pupils are equal, round, and reactive to light. Extra-ocular movements are full and fluid. No ptosis or nystagmus. V-XII: Hearing is grossly intact. Facial features are symmetric, with normal sensation and strength. The palate rises symmetrically and the tongue protrudes midline. Sternocleidomastoids 5/5. Motor Examination: Normal tone, bulk, and strength, 5/5 on left, 4/5 on right, 4/5 bilateral IHM  Coordination:  Finger to nose testing was normal.   No resting or intention tremor  Gait and Station:  Wide based      ASSESSMENT AND PLAN    78-year-old female seen in follow-up headaches, carpal tunnel syndrome, and insomnia. Patient also continues with cognitive complaints. She is now off of amitriptyline. She is still having issues. MRI of the lumbar spine did show disc disease. Given that she is continuing to have symptoms, will send to neurosurgery for further evaluation. 1.  Referral to neurosurgery for evaluation of disc disease  2. Encourage patient to reestablish with endocrine  3. Start gabapentin 100 mg nightly to help with RLS, insomnia, and daily headache. Patient does not recall she previously started this. 4.  Discussed that we can do referral for neuropsychological testing, but we will wait to see if patient improves with medication adjustment    Follow-up in 3 months    Charlene Ryan MD    This note was created using voice recognition software. Despite editing, there may be syntax errors. This note will not be viewable in [address]

## 2018-02-02 NOTE — LETTER
Name:  Wilma Rey 
:  1950 MRN:  [id number] PCP:  Zach Bob MD 
 
Chief Complaint Patient presents with  Dizziness HISTORY OF PRESENT ILLNESS: 
Boni Ramirez is a 69 y.o., female who presents today for follow up for headaches, dizziness and numbness and tingling in both hands. She is still having some sciatic pain. This is mostly on the right side. Changing positions when she has pain is helpful. She had an MRI L spine with several discs at L4-5. She also has some right sided foraminal narrowing. She doesn't like to lie on her back. She does have stiffness from her fibromyalgia. She is going to PT again to help her. She is doing twice a week for this. It is focused on her right leg and back. She has her knee taped and this dose help her. She follows with rheumatology. She does have RLS. She does stretching exercises for her fibro. She is using a electric carts when she goes places due to weakness and pains. She is not resting well secondary to pain. Recap; She was diagnosed with CTS and left ulnar neuropathy. Her main symptom is weakness in the hands, especially on the left. She does have issues with thyroid. She has not been to endocrine for this at this point. She had neuropsych 2008 that was negative. She went off amitriptyline due to weight gain and memory complaints. She does have issues with sleeping. She can't go to sleep or stay asleep. She always has a baseline headache. No nausea. Sound is really sensitive to her as well. She is trying to avoid sugar. She is trying to avoid gluten, etc. 
She is having knee pain and is having issues getting help with this. It is affecting her walking. She is having some low back pain. She will get sciatica on the right side. She does have a history of lumbar stenosis.   
 
She had testing done at the [facility] clinic, was told that she doesn't have dementia or ADD, cognitive complaints are probably due to Lyme or mold. Complains of trouble sleeping, has seen sleep medicine and had CPAP adjusted which did not help. Imaging: MRI lumbar spine: Focal areas of disc disease with disc extrusion at L4/5 (I personally reviewed these images in provided CD and this is my impression) Current Outpatient Prescriptions Medication Sig  TIROSINT 75 mcg cap  LACTOBACILLUS ACIDOPHILUS (PROBIOTIC PO) Take  by mouth.  MAGNESIUM GLYCINATE, BULK, by Does Not Apply route.  HYDROcodone-acetaminophen (NORCO) 7.5-325 mg per tablet  ibuprofen (MOTRIN) 600 mg tablet  amoxicillin (AMOXIL) 500 mg capsule  gabapentin (NEURONTIN) 100 mg capsule Take 1 Cap by mouth nightly.  Levothyroxine (TIROSINT) 150 mcg cap Take  by mouth. No current facility-administered medications for this visit. Allergies Allergen Reactions  Adhes. Band-Tape-Benzalkonium Swelling  Dairy Aid [Lactase] Unknown (comments)  Gluten Diarrhea \"brain fog\", aching  Iodinated Contrast- Oral And Iv Dye Other (comments) Gets dizzy and shakey  Milk Containing Products Other (comments) Aches and stomach cramps  Motrin [Ibuprofen] Unknown (comments)  Other Medication Other (comments) Super sensitive to all medication  Soy Unknown (comments)  Sulfa (Sulfonamide Antibiotics) Unable to Obtain  Wheat Unknown (comments) Past Medical History:  
Diagnosis Date  Chest pain, unspecified  Chronic fatigue syndrome 3/16/2012  Fibromyalgia  Impaired glucose tolerance  Other dyspnea and respiratory abnormality  Palpitations  Shortness of breath  Skin cancer 3/16/2012  Unspecified hypothyroidism   
 goiter  Unspecified vitamin D deficiency Past Surgical History:  
Procedure Laterality Date  HX BREAST LUMPECTOMY  7/2013  
 right breast  
 HX TONSILLECTOMY  HX TONSILLECTOMY  HX UROLOGICAL urethral opening was widened Social History Social History  Marital status:  Spouse name: N/A  
 Number of children: N/A  
 Years of education: N/A Occupational History  Not on file. Social History Main Topics  Smoking status: Never Smoker  Smokeless tobacco: Never Used  Alcohol use No  
   Comment: Rare  Drug use: No  
 Sexual activity: Yes  
  Partners: Male Other Topics Concern  Not on file Social History Narrative Lives in [facility] with . Used to work as a  for [employer]. Family History Problem Relation Age of Onset  Coronary Artery Disease Mother  Thyroid Disease Mother  Diabetes Mother  Cancer Mother Multiple Myeloma  Cancer Father Colon cancer  Arthritis-osteo Father  Heart Disease Father  High Cholesterol Brother  Thyroid Disease Brother   
  thyroid cancer  Cancer Brother Thyroid and Skin  Diabetes Maternal Aunt  Hypertension Other  Heart Disease Other  Depression Other  Anxiety Other  Cancer Sister Skin  Cancer Brother Multiple Myeloma and skin  Cancer Brother Skin PHYSICAL EXAMINATION:   
Visit Vitals  /75  Pulse 83  SpO2 98% General:  Well defined, nourished, and groomed individual in no acute distress. Neck: Supple, nontender, no bruits, no pain with resistance to active range of motion. Heart: Regular rate and rhythm, no murmurs, rub, or gallop. Normal S1S2. Lungs:  Clear to auscultation bilaterally with equal chest expansion, no cough, no wheeze Musculoskeletal:  Extremities revealed no edema and had full range of motion of joints. Psych: Normal 
 
NEUROLOGICAL EXAMINATION:    
Mental Status:   Alert and oriented to person, place, and time with recent and remote memory intact. Attention span and concentration are normal. Speech is fluent with a full fund of knowledge. Cranial Nerves:   
II, III, IV, VI:  Visual acuity grossly intact. Pupils are equal, round, and reactive to light. Extra-ocular movements are full and fluid. No ptosis or nystagmus. V-XII: Hearing is grossly intact. Facial features are symmetric, with normal sensation and strength. The palate rises symmetrically and the tongue protrudes midline. Sternocleidomastoids 5/5. Motor Examination: Normal tone, bulk, and strength, 5/5 on left, 4/5 on right, 4/5 bilateral IHM Coordination:  Finger to nose testing was normal.   No resting or intention tremor Gait and Station:  Wide based ASSESSMENT AND PLAN 
 
61-year-old female seen in follow-up headaches, carpal tunnel syndrome, and insomnia. Patient also continues with cognitive complaints. She is now off of amitriptyline. She is still having issues. MRI of the lumbar spine did show disc disease. Given that she is continuing to have symptoms, will send to neurosurgery for further evaluation. 1.  Referral to neurosurgery for evaluation of disc disease 2. Encourage patient to reestablish with endocrine 3. Start gabapentin 100 mg nightly to help with RLS, insomnia, and daily headache. Patient does not recall she previously started this. 4.  Discussed that we can do referral for neuropsychological testing, but we will wait to see if patient improves with medication adjustment Follow-up in 3 months Sandra Knowles MD 
 
This note was created using voice recognition software. Despite editing, there may be syntax errors. This note will not be viewable in [address]

## 2018-02-12 ENCOUNTER — TELEPHONE (OUTPATIENT)
Dept: NEUROLOGY | Age: 68
End: 2018-02-12

## 2018-02-12 NOTE — TELEPHONE ENCOUNTER
----- Message from Sandra Quesada sent at 2/12/2018  2:30 PM EST -----  Regarding: Dr. Christal Dempsey  The patient is requesting a call back from the nurse with the name and phone number of the orthopedic doctor that the doctor referred her to.  [phone] ([phone]

## 2018-04-09 LAB
CREATININE, EXTERNAL: 0.76
HBA1C MFR BLD HPLC: 5.7 %
LDL-C, EXTERNAL: 170

## 2018-04-16 ENCOUNTER — OFFICE VISIT (OUTPATIENT)
Dept: SLEEP MEDICINE | Age: 68
End: 2018-04-16

## 2018-04-16 VITALS
OXYGEN SATURATION: 94 % | HEART RATE: 88 BPM | SYSTOLIC BLOOD PRESSURE: 127 MMHG | BODY MASS INDEX: 41.14 KG/M2 | WEIGHT: 256 LBS | DIASTOLIC BLOOD PRESSURE: 81 MMHG | HEIGHT: 66 IN

## 2018-04-16 DIAGNOSIS — G47.33 OBSTRUCTIVE SLEEP APNEA (ADULT) (PEDIATRIC): Primary | ICD-10-CM

## 2018-04-16 DIAGNOSIS — G47.00 INSOMNIA, UNSPECIFIED TYPE: ICD-10-CM

## 2018-04-16 PROBLEM — E66.01 OBESITY, MORBID (HCC): Status: ACTIVE | Noted: 2018-04-16

## 2018-04-16 RX ORDER — AZITHROMYCIN 250 MG/1
TABLET, FILM COATED ORAL
COMMUNITY
Start: 2018-03-16 | End: 2018-05-13

## 2018-04-16 RX ORDER — LEVOTHYROXINE SODIUM 50 UG/1
CAPSULE ORAL DAILY
COMMUNITY
Start: 2018-04-12 | End: 2022-01-04 | Stop reason: DRUGHIGH

## 2018-04-16 RX ORDER — LEVOTHYROXINE SODIUM 100 UG/1
CAPSULE ORAL
COMMUNITY
Start: 2018-01-19 | End: 2018-05-13

## 2018-04-16 RX ORDER — ALBUTEROL SULFATE 90 UG/1
AEROSOL, METERED RESPIRATORY (INHALATION)
COMMUNITY
Start: 2018-03-16 | End: 2018-05-13

## 2018-04-16 RX ORDER — DULAGLUTIDE 0.75 MG/.5ML
INJECTION, SOLUTION SUBCUTANEOUS
COMMUNITY
Start: 2018-03-20 | End: 2018-07-17 | Stop reason: SDUPTHER

## 2018-04-16 NOTE — PROGRESS NOTES
[address] [facility] [place]. [place], [address]  Tel.  [phone]  Fax. [address] [facility] [address]  [place], [address] [facility]  Tel.  [phone]  Fax. [phone] [address] Ritesh Ortiz   Tel.  [phone]  Fax. [phone]     S>Megha Oliver is a 69 y.o. female seen for a positive airway pressure follow-up. She reports no problems using the device. The following problems are identified:    Drowsiness yes Problems exhaling Yes, mouth opens   Snoring no Forget to put on no   Mask Comfortable yes Can't fall asleep no   Dry Mouth Yes, doesn't like the humidity setting Mask falls off no   Air Leaking no Frequent awakenings Yes, every 2 hours to go to the bathroom     Download reviewed. She admits that her sleep has worsened. Therapy Apnea Index averaged over PAP use: 3 /hr which reflects improved sleep breathing condition. Allergies   Allergen Reactions    Adhes. Band-Tape-Benzalkonium Swelling    Adhesive Swelling    Dairy Aid [Lactase] Unknown (comments)    Gluten Diarrhea     \"brain fog\", aching    Iodinated Contrast- Oral And Iv Dye Other (comments)     Gets dizzy and shakey    Milk Containing Products Other (comments)     Aches and stomach cramps    Motrin [Ibuprofen] Unknown (comments)    Other Medication Other (comments)     Super sensitive to all medication    Soy Unknown (comments)    Sulfa (Sulfonamide Antibiotics) Unable to Obtain    Wheat Unknown (comments)       She has a current medication list which includes the following prescription(s): ventolin hfa, azithromycin, trulicity, lactobacillus acidophilus, hydrocodone-acetaminophen, ibuprofen, magnesium glycinate, levothyroxine, tirosint, tirosint, tirosint, gabapentin, and amoxicillin. .      She  has a past medical history of Chest pain, unspecified; Chronic fatigue syndrome (3/16/2012); Fibromyalgia; Impaired glucose tolerance;  Other dyspnea and respiratory abnormality; Palpitations; Shortness of breath; Skin cancer (3/16/2012); Unspecified hypothyroidism; and Unspecified vitamin D deficiency. Warren Sleepiness Score: 12   and Modified F.O.S.Q. Score Total / 2: 7.5   which reflect improved sleep quality over therapy time. O>    Visit Vitals    /81    Pulse 88    Ht 5' 6\" (1.676 m)    Wt 256 lb (116.1 kg)    SpO2 94%    BMI 41.32 kg/m2           General:   Alert, oriented, not in distress   Neck:   No JVD    Chest/Lungs:  symetrical lung expansion , no accessory muscle use    Extremities:  no obvious rashes , negative edema    Neuro:  No focal deficits ; No obvious tremor    Psych:  Normal affect ,  Normal countenance ;         A>    ICD-10-CM ICD-9-CM    1. Obstructive sleep apnea (adult) (pediatric) G47.33 327.23 AMB SUPPLY ORDER      CANCELED: AMB SUPPLY ORDER   2. Insomnia, unspecified type G47.00 780.52      AHI = 10(9-16). On CPAP :  6-12 cmH2O. Compliant:      yes    Therapeutic Response:  unsure    P>      * Follow-up Disposition:  Return in about 1 year (around 4/16/2019). Change setting to 5-10 cm. I advised her to try the humidity setting and try a different nasal mask style. Chin strap ordered. .she will continue on her weight loss plan  * She was asked to contact our office for any problems regarding PAP therapy. * Counseling was provided regarding the importance of regular PAP use and on proper sleep hygiene and safe driving. * Re-enforced proper and regular cleaning for the device. 2. Insomnia - I have advised a regular sleep wake cycle. she  was advised to avoid looking at the clock during the night. Ideally, the clock face should be turned away. she was advised to minimize caffeine use and to avoid caffeine-containing beverages 8 hours prior to bedtime. Naps were discouraged. A regular exercise schedule, at least 3 hours before bedtime, would be beneficial to improving sleep quality.   she was advised to avoid evening light and to use sunglasses in the late afternoon. Watching TV, using laptops, tablets and smartphones in the evening was discouraged. she  was advised to keep the bedroom cool and dark. All of her questions were addressed.     Speedy Marie MD  Diplomate in Sleep Medicine  [facility]

## 2018-04-16 NOTE — PATIENT INSTRUCTIONS
[address] [place]. [place], [address]  Tel.  [phone]  Fax. [address] [facility] [address]  [place], [address] [facility]  Tel.  [phone]  Fax. [phone] [address] Ritesh Ortiz  Tel.  [phone]  Fax. [phone]       Insomnia: After Your Visit  Your Care Instructions  Insomnia is the inability to sleep well. It is a common problem for most people at some time. Insomnia may make it hard for you to get to sleep, stay asleep, or sleep as long as you need to. This can make you tired and grouchy during the day. It can also make you forgetful, less effective at work, and unhappy. Insomnia can be caused by conditions such as depression or anxiety. Pain can also affect your ability to sleep. When these problems are solved, the insomnia usually clears up. But sometimes bad sleep habits can cause insomnia. If insomnia is affecting your work or your enjoyment of life, you can take steps to improve your sleep. Follow-up care is a key part of your treatment and safety. Be sure to make and go to all appointments, and call your doctor if you are having problems. It's also a good idea to know your test results and keep a list of the medicines you take. How can you care for yourself at home? What to avoid  · Do not have drinks with caffeine, such as coffee or black tea, for 8 hours before bed. · Do not smoke or use other types of tobacco near bedtime. Nicotine is a stimulant and can keep you awake. · Avoid drinking alcohol late in the evening, because it can cause you to wake in the middle of the night. · Do not eat a big meal close to bedtime. If you are hungry, eat a light snack. · Do not drink a lot of water close to bedtime, because the need to urinate may wake you up during the night. · Do not read or watch TV in bed. Use the bed only for sleeping and sexual activity.   What to try  · Go to bed at the same time every night, and wake up at the same time every morning. Do not take naps during the day. · Keep your bedroom quiet, dark, and cool. · Get regular exercise, but not within 3 to 4 hours of your bedtime. .  · Sleep on a comfortable pillow and mattress. · If watching the clock makes you anxious, turn it facing away from you so you cannot see the time. · If you worry when you lie down, start a worry book. Well before bedtime, write down your worries, and then set the book and your concerns aside. · Try meditation or other relaxation techniques before you go to bed. · If you cannot fall asleep, get up and go to another room until you feel sleepy. Do something relaxing. Repeat your bedtime routine before you go to bed again. · Make your house quiet and calm about an hour before bedtime. Turn down the lights, turn off the TV, log off the computer, and turn down the volume on music. This can help you relax after a busy day. When should you call for help? Watch closely for changes in your health, and be sure to contact your doctor if:  · Your efforts to improve your sleep do not work. · Your insomnia gets worse. · You fall asleep during the day. Where can you learn more? Go to CopyRightNow.be  Enter P513 in the search box to learn more about \"Insomnia: After Your Visit. \"   © 9724-7695 Healthwise, Incorporated. Care instructions adapted under license by [employer] (which disclaims liability or warranty for this information). This care instruction is for use with your licensed healthcare professional. If you have questions about a medical condition or this instruction, always ask your healthcare professional. [place] [address] any warranty or liability for your use of this information. Content Version: 2.1.83559; Last Revised: June 26, 2010    Drowsy Driving:  The [facility] 54 cites drowsiness as a causing factor in more than 310,361 police reported crashes annually, resulting in 76,000 injuries and 1,500 deaths. Other surveys suggest 55% of people polled have driven while drowsy in the past year, 23% had fallen asleep but not crashed, 3% crashed, and 2% had and accident due to drowsy driving. Who is at risk? Young Drivers: One study of drowsy driving accidents states that 55% of the drivers were under 25 years. Of those, 75% were male. Shift Workers and Travelers: People who work overnight or travel across time zones frequently are at higher risk of experiencing Circadian Rhythm Disorders. They are trying to work and function when their body is programed to sleep. Sleep Deprived: Lack of sleep has a serious impact on your ability to pay attention or focus on a task. Consistently getting less than the average of 8 hours your body needs creates partial or cumulative sleep deprivation. Untreated Sleep Disorders: Sleep Apnea, Narcolepsy, R.L.S., and other sleep disorders (untreated) prevent a person from getting enough restful sleep. This leads to excessive daytime sleepiness and increases the risk for drowsy driving accidents by up to 7 times. Medications / Alcohol: Even over the counter medications can cause drowsiness. Medications that impair a drivers attention should have a warning label. Alcohol naturally makes you sleepy and on its own can cause accidents. Combined with excessive drowsiness its effects are amplified. Signs of Drowsy Driving:   * You don't remember driving the last few miles   * You may drift out of your gee   * You are unable to focus and your thoughts wander   * You may yawn more often than normal   * You have difficulty keeping your eyes open / nodding off   * Missing traffic signs, speeding, or tailgating  Prevention-   Good sleep hygiene, lifestyle and behavioral choices have the most impact on drowsy driving. There is no substitute for sleep and the average person requires 8 hours nightly.  If you find yourself driving drowsy, stop and sleep. Consider the sleep hygiene tips provided during your visit as well. Medication Refill Policy: Refills for all medications require 1 week advance notice. Please have your pharmacy fax a refill request. We are unable to fax, or call in \"controled substance\" medications and you will need to pick these prescriptions up from our office. [employer]    Thank you for requesting access to [employer]. Please follow the instructions below to securely access and download your online medical record. [employer] allows you to send messages to your doctor, view your test results, renew your prescriptions, schedule appointments, and more. How Do I Sign Up? 1. In your internet browser, go to [email or web address]. 2. Click on the First Time User? Click Here link in the Sign In box. You will see the New Member Sign Up page. 3. Enter your [employer] Access Code exactly as it appears below. You will not need to use this code after youve completed the sign-up process. If you do not sign up before the expiration date, you must request a new code. [employer] Access Code: Activation code not generated  Current Just[employer] Status: Active (This is the date your Just[employer] access code will )    4. Enter the last four digits of your Social Security Number (xxxx) and Date of Birth (mm/dd/yyyy) as indicated and click Submit. You will be taken to the next sign-up page. 5. Create a Just[employer] ID. This will be your Just[employer] login ID and cannot be changed, so think of one that is secure and easy to remember. 6. Create a Just[employer] password. You can change your password at any time. 7. Enter your Password Reset Question and Answer. This can be used at a later time if you forget your password. 8. Enter your e-mail address. You will receive e-mail notification when new information is available in [address] 9. Click Sign Up. You can now view and download portions of your medical record.   10. Click the Download Summary menu link to download a portable copy of your medical information. Additional Information    If you have questions, please call [phone]. Remember, [employer] is NOT to be used for urgent needs. For medical emergencies, dial 911.

## 2018-04-17 ENCOUNTER — DOCUMENTATION ONLY (OUTPATIENT)
Dept: SLEEP MEDICINE | Age: 68
End: 2018-04-17

## 2018-04-17 NOTE — PROGRESS NOTES
04/17/2018, 12:43 PM Settings requested by Elio Lemons   Set Mode to AutoSet for Her   Set Min Pressure to 5.0 cmH2O   Set Max Pressure to 10.0 cmH2O   Set EPR type to Fulltime   Set EPR level to 2   Set Ramp enable to Off   Set Ramp time to 5 min   Set Start pressure to 4.0 cmH2O    Previous Settings   Set EPR level to 2   Set Mode to AutoSet for Her   Set Min Pressure to 6.0 cmH2O   Set Max Pressure to 12.0 cmH2O   Set EPR type to Fulltime   Set Ramp enable to Off   Set Ramp time to 5 min   Set Start pressure to 4.0 cmH2O

## 2018-05-04 ENCOUNTER — TELEPHONE (OUTPATIENT)
Dept: SLEEP MEDICINE | Age: 68
End: 2018-05-04

## 2018-05-04 DIAGNOSIS — G47.33 OBSTRUCTIVE SLEEP APNEA (ADULT) (PEDIATRIC): Primary | ICD-10-CM

## 2018-05-04 NOTE — PROGRESS NOTES
Patient was fit and loaned a F&P Eson 2. Patient will follow up if she would like to order this mask.

## 2018-05-10 ENCOUNTER — DOCUMENTATION ONLY (OUTPATIENT)
Dept: SLEEP MEDICINE | Age: 68
End: 2018-05-10

## 2018-05-13 ENCOUNTER — APPOINTMENT (OUTPATIENT)
Dept: GENERAL RADIOLOGY | Age: 68
End: 2018-05-13
Attending: PHYSICIAN ASSISTANT
Payer: MEDICARE

## 2018-05-13 ENCOUNTER — HOSPITAL ENCOUNTER (EMERGENCY)
Age: 68
Discharge: HOME | End: 2018-05-13
Attending: EMERGENCY MEDICINE
Payer: MEDICARE

## 2018-05-13 VITALS
HEART RATE: 98 BPM | BODY MASS INDEX: 42.64 KG/M2 | RESPIRATION RATE: 18 BRPM | OXYGEN SATURATION: 96 % | WEIGHT: 255.95 LBS | HEIGHT: 65 IN | TEMPERATURE: 99.1 F

## 2018-05-13 DIAGNOSIS — J30.1 HAY FEVER: Primary | ICD-10-CM

## 2018-05-13 LAB
DEPRECATED S PYO AG THROAT QL EIA: NEGATIVE
FLUAV AG NPH QL IA: NEGATIVE
FLUBV AG NOSE QL IA: NEGATIVE

## 2018-05-13 PROCEDURE — 87880 STREP A ASSAY W/OPTIC: CPT | Performed by: PHYSICIAN ASSISTANT

## 2018-05-13 PROCEDURE — 99282 EMERGENCY DEPT VISIT SF MDM: CPT

## 2018-05-13 PROCEDURE — 87070 CULTURE OTHR SPECIMN AEROBIC: CPT | Performed by: EMERGENCY MEDICINE

## 2018-05-13 PROCEDURE — 87804 INFLUENZA ASSAY W/OPTIC: CPT | Performed by: PHYSICIAN ASSISTANT

## 2018-05-13 PROCEDURE — 71046 X-RAY EXAM CHEST 2 VIEWS: CPT

## 2018-05-13 PROCEDURE — 74011250637 HC RX REV CODE- 250/637: Performed by: PHYSICIAN ASSISTANT

## 2018-05-13 RX ORDER — ALBUTEROL SULFATE 90 UG/1
1 AEROSOL, METERED RESPIRATORY (INHALATION)
Qty: 1 INHALER | Refills: 0 | Status: SHIPPED | OUTPATIENT
Start: 2018-05-13 | End: 2019-03-25

## 2018-05-13 RX ORDER — CETIRIZINE HCL 10 MG
5-10 TABLET ORAL DAILY
Qty: 20 TAB | Refills: 0 | Status: SHIPPED | OUTPATIENT
Start: 2018-05-13 | End: 2019-03-25

## 2018-05-13 RX ORDER — ACETAMINOPHEN 325 MG/1
650 TABLET ORAL
Status: COMPLETED | OUTPATIENT
Start: 2018-05-13 | End: 2018-05-13

## 2018-05-13 RX ORDER — PREDNISONE 10 MG/1
TABLET ORAL
Qty: 21 TAB | Refills: 0 | Status: SHIPPED | OUTPATIENT
Start: 2018-05-13 | End: 2018-05-19

## 2018-05-13 RX ADMIN — ACETAMINOPHEN 650 MG: 325 TABLET ORAL at 16:59

## 2018-05-13 NOTE — DISCHARGE INSTRUCTIONS
Seasonal Allergies: Care Instructions  Your Care Instructions  Allergies occur when your body's defense system (immune system) overreacts to certain substances. The immune system treats a harmless substance as if it were a harmful germ or virus. Many things can cause this to happen. Examples include pollens, medicine, food, dust, animal dander, and mold. Your allergies are seasonal if you have symptoms just at certain times of the year. In that case, you are probably allergic to pollens from certain trees, grasses, or weeds. Allergies can be mild or severe. Over-the-counter allergy medicine may help with some symptoms. Read and follow all instructions on the label. Managing your allergies is an important part of staying healthy. Your doctor may suggest that you have tests to help find the cause of your allergies. When you know what things trigger your symptoms, you can avoid them. This can prevent allergy symptoms and other health problems. In some cases, immunotherapy might help. For this treatment, you get shots or use pills that have a small amount of certain allergens in them. Your body \"gets used to\" the allergen, so you react less to it over time. This kind of treatment may help prevent or reduce some allergy symptoms. Follow-up care is a key part of your treatment and safety. Be sure to make and go to all appointments, and call your doctor if you are having problems. It's also a good idea to know your test results and keep a list of the medicines you take. How can you care for yourself at home? · Be safe with medicines. Take your medicines exactly as prescribed. Call your doctor if you think you are having a problem with your medicine. · During your allergy season, keep windows closed. If you need to use air-conditioning, change or clean all filters every month. Take a shower and change your clothes after you have been outside. · Stay inside when pollen counts are high.  Vacuum once or twice a week. Use a vacuum  with a HEPA filter or a double-thickness filter. When should you call for help? Give an epinephrine shot if:  ? · You think you are having a severe allergic reaction. ? After giving an epinephrine shot, call 911, even if you feel better. ?Call 911 if:  ? · You have symptoms of a severe allergic reaction. These may include:  ¨ Sudden raised, red areas (hives) all over your body. ¨ Swelling of the throat, mouth, lips, or tongue. ¨ Trouble breathing. ¨ Passing out (losing consciousness). Or you may feel very lightheaded or suddenly feel weak, confused, or restless. ? · You have been given an epinephrine shot, even if you feel better. ?Call your doctor now or seek immediate medical care if:  ? · You have symptoms of an allergic reaction, such as:  ¨ A rash or hives (raised, red areas on the skin). ¨ Itching. ¨ Swelling. ¨ Belly pain, nausea, or vomiting. ? Watch closely for changes in your health, and be sure to contact your doctor if:  ? · You do not get better as expected. Where can you learn more? Go to http://juliana-david.info/. Enter J912 in the search box to learn more about \"Seasonal Allergies: Care Instructions. \"  Current as of: September 29, 2016  Content Version: 11.4  © 3799-2932 [employer]. Care instructions adapted under license by [employer] (which disclaims liability or warranty for this information). If you have questions about a medical condition or this instruction, always ask your healthcare professional. [place] [address] any warranty or liability for your use of this information.

## 2018-05-13 NOTE — ED PROVIDER NOTES
EMERGENCY DEPARTMENT HISTORY AND PHYSICAL EXAM      Date: 5/13/2018  Patient Name: Aura Kulkarni    History of Presenting Illness     Chief Complaint   Patient presents with    Sore Throat     patient complain of sore throat and dry hacking cough onset yesterday        History Provided By: Patient    HPI: Aura Kulkarni, 77 y.o. female with PMHx significant for fibromyalgia, presents ambulatory to the ED with cc of an acute onset of a sore throat and dry cough progressively worsening since yesterday. Pt reports associated sx of a subjective fever, difficulty swallowing secondary to pain, decreased appetite secondary to pain, congestion, generalized throbbing headache secondary to sx and post tussive emesis as well. She states over the last few days she has been feeling fatigued and \"run down\" noting she began with a sore throat last night. Pt discloses her sx are exacerbated with laying flat and has found no relief with Salt water gargle / OTC cough suppressants leading her to the ED. She denies any fevers, chills, chest pain, SOB, abdominal pain, nausea, diarrhea, dysuria, or hematuria. There are no other complaints, changes, or physical findings at this time. PCP: Gary Christy MD    Current Outpatient Prescriptions   Medication Sig Dispense Refill    VENTOLIN HFA 90 mcg/actuation inhaler       azithromycin (ZITHROMAX) 250 mg tablet       TRULICITY 2.06 XW/6.3 mL sub-q pen       TIROSINT 100 mcg cap       TIROSINT 50 mcg cap       TIROSINT 75 mcg cap       LACTOBACILLUS ACIDOPHILUS (PROBIOTIC PO) Take  by mouth.  gabapentin (NEURONTIN) 100 mg capsule Take 1 Cap by mouth nightly. 30 Cap 5    HYDROcodone-acetaminophen (NORCO) 7.5-325 mg per tablet       ibuprofen (MOTRIN) 600 mg tablet       amoxicillin (AMOXIL) 500 mg capsule       MAGNESIUM GLYCINATE, BULK, by Does Not Apply route.  Levothyroxine (TIROSINT) 150 mcg cap Take  by mouth.          Past History     Past Medical History:  Past Medical History:   Diagnosis Date    Chest pain, unspecified     Chronic fatigue syndrome 3/16/2012    Fibromyalgia     Impaired glucose tolerance     Other dyspnea and respiratory abnormality     Palpitations     Shortness of breath     Skin cancer 3/16/2012    Unspecified hypothyroidism     goiter    Unspecified vitamin D deficiency        Past Surgical History:  Past Surgical History:   Procedure Laterality Date    HX BREAST LUMPECTOMY  7/2013    right breast    HX TONSILLECTOMY      HX TONSILLECTOMY      HX UROLOGICAL      urethral opening was widened       Family History:  Family History   Problem Relation Age of Onset    Coronary Artery Disease Mother     Thyroid Disease Mother     Diabetes Mother     Cancer Mother      Multiple Myeloma    Cancer Father      Colon cancer    Arthritis-osteo Father     Heart Disease Father     High Cholesterol Brother     Thyroid Disease Brother      thyroid cancer    Cancer Brother      Thyroid and Skin    Diabetes Maternal Aunt     Hypertension Other     Heart Disease Other     Depression Other     Anxiety Other     Cancer Sister      Skin    Cancer Brother      Multiple Myeloma and skin    Cancer Brother      Skin       Social History:  Social History   Substance Use Topics    Smoking status: Never Smoker    Smokeless tobacco: Never Used    Alcohol use No      Comment: Rare       Allergies: Allergies   Allergen Reactions    Adhes.  Band-Tape-Benzalkonium Swelling    Adhesive Swelling    Dairy Aid [Lactase] Unknown (comments)    Gluten Diarrhea     \"brain fog\", aching    Iodinated Contrast- Oral And Iv Dye Other (comments)     Gets dizzy and shakey    Milk Containing Products Other (comments)     Aches and stomach cramps    Motrin [Ibuprofen] Unknown (comments)    Other Medication Other (comments)     Super sensitive to all medication    Soy Unknown (comments)    Sulfa (Sulfonamide Antibiotics) Unable to Obtain    Wheat Unknown (comments)         Review of Systems   Review of Systems   Constitutional: Positive for appetite change (secondary to sx), fatigue and fever (subjective ). Negative for chills. HENT: Positive for congestion, sore throat and trouble swallowing (secondary to pain ). Negative for ear pain. Eyes: Negative for pain, redness and visual disturbance. Respiratory: Positive for cough (dry ). Negative for shortness of breath. Cardiovascular: Negative for chest pain and palpitations. Gastrointestinal: Positive for vomiting (post tussive emesis ). Negative for abdominal pain, diarrhea and nausea. Genitourinary: Negative for dysuria, frequency, hematuria and urgency. Musculoskeletal: Negative for back pain, gait problem, neck pain and neck stiffness. Skin: Negative for rash and wound. Neurological: Positive for headaches (secondary to cough ). Negative for dizziness, weakness, light-headedness and numbness. All other systems reviewed and are negative. Physical Exam   Physical Exam   Constitutional: She is oriented to person, place, and time. She appears well-developed and well-nourished. No distress. 78 yo  female   HENT:   Head: Normocephalic and atraumatic. Right Ear: Tympanic membrane, external ear and ear canal normal. No middle ear effusion. Left Ear: Tympanic membrane, external ear and ear canal normal.  No middle ear effusion. Nose: Nose normal. Right sinus exhibits no maxillary sinus tenderness and no frontal sinus tenderness. Left sinus exhibits no maxillary sinus tenderness and no frontal sinus tenderness. Mouth/Throat: Uvula is midline, oropharynx is clear and moist and mucous membranes are normal. No oropharyngeal exudate, posterior oropharyngeal edema or posterior oropharyngeal erythema. Post nasal drainage. Eyes: Conjunctivae are normal. Right eye exhibits no discharge. Left eye exhibits no discharge. Neck: Normal range of motion. Neck supple. Cardiovascular: Normal rate, regular rhythm and normal heart sounds. No murmur heard. Pulmonary/Chest: Effort normal and breath sounds normal. No respiratory distress. She has no wheezes. Lymphadenopathy:     She has no cervical adenopathy. Neurological: She is alert and oriented to person, place, and time. Skin: Skin is warm and dry. She is not diaphoretic. Psychiatric: She has a normal mood and affect. Her behavior is normal.   Nursing note and vitals reviewed. Diagnostic Study Results     Labs -     Recent Results (from the past 12 hour(s))   STREP AG SCREEN, GROUP A    Collection Time: 05/13/18  4:50 PM   Result Value Ref Range    Group A Strep Ag ID NEGATIVE  NEG     INFLUENZA A & B AG (RAPID TEST)    Collection Time: 05/13/18  4:50 PM   Result Value Ref Range    Influenza A Antigen NEGATIVE  NEG      Influenza B Antigen NEGATIVE  NEG         Radiologic Studies -   CXR Results  (Last 48 hours)               05/13/18 1650  XR CHEST PA LAT Preliminary result    Impression:  IMPRESSION:   No acute process. Narrative:  INDICATION:   cough       COMPARISON: March 8, 2006       FINDINGS:       Frontal and lateral views of the chest demonstrate a normal cardiomediastinal   silhouette. The lungs are adequately expanded. There is no edema, effusion,   consolidation, or pneumothorax. The osseous structures are unremarkable. Medical Decision Making   I am the first provider for this patient. I reviewed the vital signs, available nursing notes, past medical history, past surgical history, family history and social history. Vital Signs-Reviewed the patient's vital signs.   Patient Vitals for the past 12 hrs:   Temp Pulse Resp SpO2   05/13/18 1540 99.1 °F (37.3 °C) 98 18 96 %       Pulse Oximetry Analysis - 96% on room air    Cardiac Monitor:   Rate: 98 bpm  Rhythm: Normal Sinus Rhythm        Records Reviewed: Nursing Notes, Old Medical Records, Previous Radiology Studies and Previous Laboratory Studies    Provider Notes (Medical Decision Making):     DDx: Strep, Bronchitis, PNA, Viral illness, Influenza. ED Course:   Initial assessment performed. The patients presenting problems have been discussed, and they are in agreement with the care plan formulated and outlined with them. I have encouraged them to ask questions as they arise throughout their visit. Progress Notes:    6:07 PM   The pt has been re-evaluated. She was updated on results and informed of the plan for discharge with symptomatic management. Critical Care Time: 0 minutes    Disposition:  Discharge Note:  6:08 PM  The patient is ready for discharge. The patient's signs, symptoms, diagnosis, and discharge instruction have been discussed and the patient has conveyed their understanding. The patient is to follow up as recommended or return to the ER should their symptoms worsen. Plan has been discussed and the patient is in agreement. Written by Marquita Sinclair ED Scribe, as dictated by TIM Xiong     PLAN:  1. Current Discharge Medication List        2. Follow-up Information     Follow up With Details Comments 31 Susan Gomez MD In 3 days  [id number]  [facility] [address] [phone]        3. Drink plenty of fluids  4. Continue taking over the counter decongestants   Return to ED if worse     Diagnosis     Clinical Impression:   1. Hay fever        Attestations:    Attestation: This note is prepared by Cade Voss. Yahir, acting as Scribe for [employer] .      TIM oCoper: The scribe's documentation has been prepared under my direction and personally reviewed by me in its entirety. I confirm that the note above accurately reflects all work, treatment, procedures, and medical decision making performed by me.

## 2018-05-15 LAB
BACTERIA SPEC CULT: NORMAL
SERVICE CMNT-IMP: NORMAL

## 2018-06-07 ENCOUNTER — OFFICE VISIT (OUTPATIENT)
Dept: ENDOCRINOLOGY | Age: 68
End: 2018-06-07

## 2018-06-07 ENCOUNTER — TELEPHONE (OUTPATIENT)
Dept: ENDOCRINOLOGY | Age: 68
End: 2018-06-07

## 2018-06-07 VITALS
WEIGHT: 258.6 LBS | DIASTOLIC BLOOD PRESSURE: 62 MMHG | HEIGHT: 65 IN | SYSTOLIC BLOOD PRESSURE: 137 MMHG | BODY MASS INDEX: 43.09 KG/M2 | HEART RATE: 70 BPM

## 2018-06-07 DIAGNOSIS — R73.02 IMPAIRED GLUCOSE TOLERANCE: Primary | ICD-10-CM

## 2018-06-07 DIAGNOSIS — E66.01 CLASS 3 SEVERE OBESITY DUE TO EXCESS CALORIES WITH SERIOUS COMORBIDITY AND BODY MASS INDEX (BMI) OF 40.0 TO 44.9 IN ADULT (HCC): ICD-10-CM

## 2018-06-07 DIAGNOSIS — E27.40 ADRENAL INSUFFICIENCY (HCC): ICD-10-CM

## 2018-06-07 RX ORDER — INSULIN PUMP SYRINGE, 3 ML
EACH MISCELLANEOUS
Qty: 1 KIT | Refills: 0 | Status: SHIPPED | OUTPATIENT
Start: 2018-06-07 | End: 2018-06-08 | Stop reason: SDUPTHER

## 2018-06-07 RX ORDER — METFORMIN HYDROCHLORIDE 500 MG/1
500 TABLET, EXTENDED RELEASE ORAL 2 TIMES DAILY
Qty: 60 TAB | Refills: 11 | Status: SHIPPED | OUTPATIENT
Start: 2018-06-07 | End: 2019-03-25

## 2018-06-07 RX ORDER — CHOLECALCIFEROL TAB 125 MCG (5000 UNIT) 125 MCG
TAB ORAL
COMMUNITY

## 2018-06-07 NOTE — MR AVS SNAPSHOT
[facility] [address] [facility] [address] [address] [phone] Patient: González Quintana MRN: [id number] Visit Information Date & Time Provider Department Dept. Phone Encounter #  
 6/7/2018 12:10 PM Clarene Baumgarten, [address] [place] [address] Diabetes and Endocrinology [phone] Your Appointments 7/24/2018  1:40 PM  
Follow Up with Alex Rizzo MD  
[facility]) Appt Note: dizziness; f/u/ dizziness [facility] 05/03/18 [place] [address] [facility] [address] [phone]  
  
   
 [place] [address] [address] [address] Upcoming Health Maintenance Date Due Hepatitis C Screening 1950 DTaP/Tdap/Td series (1 - Tdap) 4/21/1969 FOBT Q 1 YEAR AGE 50-75 4/21/1998 ZOSTER VACCINE AGE 60> 2/21/2008 GLAUCOMA SCREENING Q2Y 4/21/2013 Pneumococcal 65+ High/Highest Risk (1 of 2 - PCV13) 4/21/2013 BREAST CANCER SCRN MAMMOGRAM 2/25/2015 MEDICARE YEARLY EXAM 3/14/2018 Influenza Age 5 to Adult 8/1/2018 Allergies as of 6/7/2018  Review Complete On: 5/13/2018 By: Santiago Arzate RN Severity Noted Reaction Type Reactions Adhes. Band-tape-benzalkonium  01/25/2012    Swelling Adhesive  04/01/2015    Swelling Dairy Aid [Lactase]  04/12/2012    Unknown (comments) Gluten  05/28/2015    Diarrhea \"brain fog\", aching Iodinated Contrast- Oral And Iv Dye  05/28/2015    Other (comments) Gets dizzy and shakey Milk Containing Products  05/28/2015    Other (comments) Aches and stomach cramps Motrin [Ibuprofen]  04/12/2012    Unknown (comments) Other Medication  05/28/2015    Other (comments) Super sensitive to all medication Soy  04/12/2012    Unknown (comments) Sulfa (Sulfonamide Antibiotics)  03/16/2012    Unable to Obtain Wheat  04/12/2012    Unknown (comments) Current Immunizations  Never Reviewed No immunizations on file. Not reviewed this visit You Were Diagnosed With   
  
 Codes Comments Impaired glucose tolerance    -  Primary ICD-10-CM: R73.02 
ICD-9-CM: 790.22 Class 3 severe obesity due to excess calories with serious comorbidity and body mass index (BMI) of 40.0 to 44.9 in adult [facility])     ICD-10-CM: E66.01, Z68.41 
ICD-9-CM: 278.01, V85.41 Vitals BP Pulse Height(growth percentile) Weight(growth percentile) BMI OB Status 137/62 70 5' 5\" (1.651 m) 258 lb 9.6 oz (117.3 kg) 43.03 kg/m2 Postmenopausal  
 Smoking Status Never Smoker BMI and BSA Data Body Mass Index Body Surface Area 43.03 kg/m 2 2.32 m 2 Preferred Pharmacy Pharmacy Name Phone [id number] - [place] [address] [phone] Your Updated Medication List  
  
   
This list is accurate as of 6/7/18  1:01 PM.  Always use your most recent med list.  
  
  
  
  
 cetirizine 10 mg tablet Commonly known as:  ZyrTEC Take 0.5-1 Tabs by mouth daily. cholecalciferol (VITAMIN D3) 5,000 unit Tab tablet Commonly known as:  VITAMIN D3 Take 1 tablet every day by oral route in the morning. MAGNESIUM GLYCINATE (BULK)  
by Does Not Apply route. MELATONIN-LEMON BALM LEAF EXTR PO Take  by mouth. 2 at bedtime  
  
 metFORMIN  mg tablet Commonly known as:  GLUCOPHAGE XR Take 1 Tab by mouth two (2) times a day for 360 days. One pill with breakfast and one pill with dinner OTHER(NON-FORMULARY) MonoPure 1300 EC(Omega XYMogen), ActivNutrients no iron 2/day (MVI/Mineral formula; Natural D-Hist 2/day CandiBactin-AR, Vitamin D3+K2-1 drop per day (500 iu); Minerals 1 drop a day;SACO-B, XLEAR 1 sprayper day; Formula 3 (toes) PROBIOTIC PO Take  by mouth. * thyroid (Pork) 32.5 mg tablet Commonly known as:  NATURE-THROID/WESTHROID Take  by mouth daily. Takes 81.2 mg/day * NATURE-THROID 81.25 mg Tab Generic drug:  thyroid (pork) Take 1 tablet every day by oral route in the morning. TIROSINT 50 mcg Cap Generic drug:  Levothyroxine  
daily. TRULICITY 9.12 GR/2.5 mL sub-q pen Generic drug:  dulaglutide  
every seven (7) days. VENTOLIN HFA 90 mcg/actuation inhaler Generic drug:  albuterol Take 1 Puff by inhalation every four (4) hours as needed for Wheezing. * Notice: This list has 2 medication(s) that are the same as other medications prescribed for you. Read the directions carefully, and ask your doctor or other care provider to review them with you. Prescriptions Sent to Pharmacy Refills  
 metFORMIN ER (GLUCOPHAGE XR) 500 mg tablet 11 Sig: Take 1 Tab by mouth two (2) times a day for 360 days. One pill with breakfast and one pill with dinner Class: Normal  
 Pharmacy: JONN BOLANOS [facility] [id number] [place] [address], [address] [facility] #: [phone] Route: Oral  
  
Patient Instructions 1) Metformin 500mg (one tablet) in the morning and 500mg with dinner. Take one pill with dinner for a week then increase to one with breakfast and one pill with dinner. Metformin (By mouth) Metformin Hydrochloride (met-FOR-min ayana-droe-KLOR-corin) Treats type 2 diabetes. Brand Name(s): DM2, Fortamet, Glucophage, Glucophage XR, Glumetza, Riomet There may be other brand names for this medicine. When This Medicine Should Not Be Used: This medicine is not right for everyone. Do not use if you had an allergic reaction to metformin. How to Use This Medicine:  
Liquid, Tablet, Long Acting Tablet · Take your medicine as directed. Your dose may need to be changed several times to find what works best for you. · It is best to take this medicine with food or milk. · Swallow the extended-release tablet whole.  Do not crush, break, or chew it. Tell your doctor if you have trouble swallowing the tablets whole. · Measure the oral liquid medicine with a marked measuring spoon, oral syringe, or medicine cup. · Read and follow the patient instructions that come with this medicine. Talk to your doctor or pharmacist if you have any questions. · Missed dose: Take a dose as soon as you remember. If it is almost time for your next dose, wait until then and take a regular dose. Do not take extra medicine to make up for a missed dose. · Store the medicine in a closed container at room temperature, away from heat, moisture, and direct light. Drugs and Foods to Avoid: Ask your doctor or pharmacist before using any other medicine, including over-the-counter medicines, vitamins, and herbal products. · Some medicines can affect how metformin works. Tell your doctor if you are using any of the following: ¨ Acetazolamide, dichlorphenamide, dolutegravir, isoniazid, nicotinic acid, phenytoin, ranolazine, topiramate, vandetanib, zonisamide ¨ Birth control pills ¨ Blood pressure medicine ¨ Diuretic (water pill) ¨ Phenothiazine medicine Lucianne Score Steroid medicine ¨ Thyroid medicine · Do not drink alcohol while you are using this medicine. Warnings While Using This Medicine: · Tell your doctor if you are pregnant or breastfeeding, or if you have kidney disease, liver disease, heart or blood vessel disease, heart failure, blood circulation problems, anemia, metabolic acidosis, an adrenal gland or pituitary gland disorder, vitamin B12 deficiency, or had a heart attack. Tell your doctor if you drink alcohol. · Too much of this medicine can cause a rare, but serious condition called lactic acidosis. · Part of the extended-release tablet may pass in your stool. This is normal. 
· Make sure any doctor or dentist who treats you knows that you are using this medicine.  You may need to stop using this medicine before you have surgery, an x-ray, CT scan, or other medical test. 
· Your doctor will do lab tests at regular visits to check on the effects of this medicine. Keep all appointments. · Keep all medicine out of the reach of children. Never share your medicine with anyone. Possible Side Effects While Using This Medicine:  
Call your doctor right away if you notice any of these side effects: · Allergic reaction: Itching or hives, swelling in your face or hands, swelling or tingling in your mouth or throat, chest tightness, trouble breathing · Confusion, fast heartbeat, increased hunger, shakiness · Fever or chills · Stomach pain, nausea, vomiting, muscle pain or cramping · Trouble breathing, slow heartbeat, lightheadedness, dizziness · Unusual tiredness or weakness If you notice these less serious side effects, talk with your doctor: · Diarrhea, gas If you notice other side effects that you think are caused by this medicine, tell your doctor. Call your doctor for medical advice about side effects. You may report side effects to FDA at 6-088-FDA-3055 © 2017 [address] Information is for End User's use only and may not be sold, redistributed or otherwise used for commercial purposes. The above information is an  only. It is not intended as medical advice for individual conditions or treatments. Talk to your doctor, nurse or pharmacist before following any medical regimen to see if it is safe and effective for you. Introducing [facility]! Dear Carmine Nurse: 
Thank you for requesting a [employer] account. Our records indicate that you already have an active [employer] account. You can access your account anytime at [email or web address] Did you know that you can access your hospital and ER discharge instructions at any time in [employer]? You can also review all of your test results from your hospital stay or ER visit. Additional Information If you have questions, please visit the Frequently Asked Questions section of the [employer] website at [email or web address] [employer]. [email or web address] Remember, fotobabble is NOT to be used for urgent needs. For medical emergencies, dial 911. Now available from your iPhone and Android! Please provide this summary of care documentation to your next provider. Your primary care clinician is listed as [address] [place] [address]. If you have any questions after today's visit, please call [phone].

## 2018-06-07 NOTE — PROGRESS NOTES
Chief Complaint   Patient presents with    Thyroid Problem     pcp and pharmacy verified    Blood sugar problem       History of Present Illness: Terrance Palomares is a 77 y.o. female here for follow up of \"adrenal fatigue\" and Impaired Glucose tolerance. Per the notes from Dr. Aidan Lainez in 2012 she has hx of hypothyroidism diagnosed in 1992, when she had fatigue and a goiter. She has seen several endocrinologists and her treatment regimen has changes multiple times over the years. She notes that in October of 2014 she began to have issues of vertigo and migraines. She notes she has been evaluated by Neurology and ENT here and at [facility]. She has had extensive work up and she reports that it was felt her thyroid issues were contributing to her symptoms. In April of 2014 she had a FT4 of 1.25 with TT4 of 0.9 and TSH of 0.16 by a physician at [facility]. At that time she was taking Ardelia All throid 162mg daily. At that time she was changes to 97.25 mg on T,T,Sa,Grimm and 130mg on M,W,F. She has not had any repeat thyroid function tests since that time. She notes that she was started on the Nature Throid at the end of 2012 when she felt that the synthroid/cytomel combination \"was not helping me feel better\". She notes she was started on the [employer] by Nuria Molina holistic/natural doctor\" but she cannot recall their name or exactly when. I have not seen Ms. Alison Sharp since August 2015. Pt brought labs in that showed in April 2018 her TSH was 3.72 and her A1C was 5.7%. She is currently seeing Dr. Ja Obando of Neurology headaches, dizziness and numbness and Dr. Selina Ramey of Sleep medicine for BRAVO. She is followed by Rheumatology for RLS and Fibromyalgia. Pt notes that her PCP, Dr. Shahrzad Yusuf, in January 2018 she was diagnosed with metabolic syndrome and started her on Trulicity and \"a progesterone hormone, compunded cream\".  She notes that in March 2018 she stopped taking the Trulicity because it was causing her to feel nauseous and was having stomach cramping. She notes that when she went to get her Trulicity refilled and it was going to cost her too much. Pt reports that for the first 24-48 hours after she takes her Trulicity \"It makes my stomach feel like it is tied in knots, but it does help with my crazy hungry feelings at night\". She notes that she just had her water tested and it was found to have coloform bacteria. She has been checking her BGs in the morning and they have been running in the 110-120's range. Pt reports that she has been having issues of polyuria for the last 2-3 weeks. She saw her OB and was told there was noting wrong with her uterus. She also reports issues of fatigue which started when she got sick in March 2018 \"and I never really recovered\". Pt wakes around 8-9AM  She will have breakfast about 10AM, today she had steak and water. She has lunch around 4-430PM, she had prime rib, 1/4 of a baked potato and iced tea (stevia)  She has dinner around 8-9PM She notes \"I can't eat unless I eat something\". Last night she had PB, 1/4 apple, beats and some leftover prime rib. She goes to bed around 11PM.     Her PCP is treating her thyroid, HLD and Hypovitaminosis D. Past Medical History:   Diagnosis Date    Chest pain, unspecified     Chronic fatigue syndrome 3/16/2012    Fibromyalgia     Impaired glucose tolerance     Other dyspnea and respiratory abnormality     Palpitations     Shortness of breath     Skin cancer 3/16/2012    Unspecified hypothyroidism     goiter    Unspecified vitamin D deficiency      Past Surgical History:   Procedure Laterality Date    HX BREAST LUMPECTOMY  7/2013    right breast    HX TONSILLECTOMY      HX TONSILLECTOMY      HX UROLOGICAL      urethral opening was widened     Current Outpatient Prescriptions   Medication Sig    cholecalciferol, VITAMIN D3, (VITAMIN D3) 5,000 unit tab tablet Take 1 tablet every day by oral route in the morning.     thyroid, pork, (NATURE-THROID) 81.25 mg tab Take 1 tablet every day by oral route in the morning.  MELATONIN-LEMON BALM LEAF EXTR PO Take  by mouth. 2 at bedtime    OTHER,NON-FORMULARY, MonoPure 1300 EC(Omega XYMogen), ActivNutrients no iron 2/day (MVI/Mineral formula; Natural D-Hist 2/day  CandiBactin-AR, Vitamin D3+K2-1 drop per day (500 iu); Minerals 1 drop a day;SACO-B, XLEAR 1 sprayper day; Formula 3 (toes)    metFORMIN ER (GLUCOPHAGE XR) 500 mg tablet Take 1 Tab by mouth two (2) times a day for 360 days. One pill with breakfast and one pill with dinner    Blood-Glucose Meter (ONETOUCH VERIO FLEX START) monitoring kit Test blood sugars twice per day    glucose blood VI test strips (ONETOUCH VERIO) strip Check blood sugar twice per day    VENTOLIN HFA 90 mcg/actuation inhaler Take 1 Puff by inhalation every four (4) hours as needed for Wheezing.  cetirizine (ZYRTEC) 10 mg tablet Take 0.5-1 Tabs by mouth daily.  TRULICITY 7.76 IJ/4.5 mL sub-q pen every seven (7) days.  TIROSINT 50 mcg cap daily.  LACTOBACILLUS ACIDOPHILUS (PROBIOTIC PO) Take  by mouth.  MAGNESIUM GLYCINATE, BULK, by Does Not Apply route.  thyroid, Pork, (NATURE-THROID/WESTHROID) 32.5 mg tablet Take  by mouth daily. Takes 81.2 mg/day     No current facility-administered medications for this visit. Allergies   Allergen Reactions    Adhes.  Band-Tape-Benzalkonium Swelling    Adhesive Swelling    Dairy Aid [Lactase] Unknown (comments)    Gluten Diarrhea     \"brain fog\", aching    Iodinated Contrast- Oral And Iv Dye Other (comments)     Gets dizzy and shakey    Milk Containing Products Other (comments)     Aches and stomach cramps    Motrin [Ibuprofen] Unknown (comments)    Other Medication Other (comments)     Super sensitive to all medication    Soy Unknown (comments)    Sulfa (Sulfonamide Antibiotics) Unable to Obtain    Wheat Unknown (comments)     Family History   Problem Relation Age of Onset    Coronary Artery Disease Mother     Thyroid Disease Mother     Diabetes Mother     Cancer Mother      Multiple Myeloma    Cancer Father      Colon cancer    Arthritis-osteo Father     Heart Disease Father     High Cholesterol Brother     Thyroid Disease Brother      thyroid cancer    Cancer Brother      Thyroid and Skin    Diabetes Maternal Aunt     Hypertension Other     Heart Disease Other     Depression Other     Anxiety Other     Cancer Sister      Skin    Cancer Brother      Multiple Myeloma and skin    Cancer Brother      Skin     Social History     Social History    Marital status:      Spouse name: N/A    Number of children: N/A    Years of education: N/A     Occupational History    Not on file. Social History Main Topics    Smoking status: Never Smoker    Smokeless tobacco: Never Used    Alcohol use No      Comment: Rare    Drug use: No    Sexual activity: Yes     Partners: Male     Other Topics Concern    Not on file     Social History Narrative    [place] . Used to work as a  for [employer]. Review of Systems:  - Negative except as noted in HPI    Physical Examination:  Blood pressure 137/62, pulse 70, height 5' 5\" (1.651 m), weight 258 lb 9.6 oz (117.3 kg).   - General: pleasant, no distress, good eye contact  - HEENT: no exopthalmos, no periorbital edema, no scleral/conjunctival injection, EOMI, no lid lag or stare  - Neck: supple, no thyromegaly, masses, lymph nodes, or carotid bruits, no supraclavicular or dorsocervical fat pads  - Cardiovascular: regular, normal rate, normal S1 and S2, no murmurs/rubs/gallops   - Respiratory: clear to auscultation bilaterally  - Gastrointestinal: soft, nontender, nondistended, no masses, no hepatosplenomegaly  - Musculoskeletal: no proximal muscle weakness in upper or lower extremities  - Integumentary: no acanthosis nigricans, no abdominal striae, no rashes, no edema  - Neurological: reflexes 2+ at biceps, no tremor  - Psychiatric: normal mood and affect    Data Reviewed:   Labs from Dr. Taya Ramírez, who saw Ms Sissy Alvarez in 2012 reviewed. Will request notes and labs from PCP. Assessment/Plan:     1) Impaired Glucose tolerance > Pt instructed to stop the Trulicity as it was causing nausea and abdominal cramping. Will start Metformin XR 500mg BID. Pt to check her BGs twice per day and mail her BG logs to me in 6 weeks. 2) \"Adrenal fatigue\" > Will send pt for a abdi. stim test to evaluate her adrenal function. 3) Obesity > We discussed trying pt on phentermine to help with her over-eating, but we agreed to not start two medications at the same time. If she tolerates the Metformin we can discuss the Phentermine. Pt voices understanding and agreement with the plan. Pain noted and pt was recommended to call her PCP for further evaluation and treatment, as needed    We spent 40 minutes of face to face time together and > 50% of the time was spent in counseling on the above issues. Patient Instructions   1) Metformin 500mg (one tablet) in the morning and 500mg with dinner. Take one pill with dinner for a week then increase to one with breakfast and one pill with dinner. Metformin (By mouth)   Metformin Hydrochloride (met-FOR-min ayana-droe-KLOR-corin)  Treats type 2 diabetes. Brand Name(s): DM2, Fortamet, Glucophage, Glucophage XR, Glumetza, Riomet   There may be other brand names for this medicine. When This Medicine Should Not Be Used: This medicine is not right for everyone. Do not use if you had an allergic reaction to metformin. How to Use This Medicine:   Liquid, Tablet, Long Acting Tablet  · Take your medicine as directed. Your dose may need to be changed several times to find what works best for you. · It is best to take this medicine with food or milk. · Swallow the extended-release tablet whole. Do not crush, break, or chew it.  Tell your doctor if you have trouble swallowing the tablets whole. · Measure the oral liquid medicine with a marked measuring spoon, oral syringe, or medicine cup. · Read and follow the patient instructions that come with this medicine. Talk to your doctor or pharmacist if you have any questions. · Missed dose: Take a dose as soon as you remember. If it is almost time for your next dose, wait until then and take a regular dose. Do not take extra medicine to make up for a missed dose. · Store the medicine in a closed container at room temperature, away from heat, moisture, and direct light. Drugs and Foods to Avoid:   Ask your doctor or pharmacist before using any other medicine, including over-the-counter medicines, vitamins, and herbal products. · Some medicines can affect how metformin works. Tell your doctor if you are using any of the following:  ¨ Acetazolamide, dichlorphenamide, dolutegravir, isoniazid, nicotinic acid, phenytoin, ranolazine, topiramate, vandetanib, zonisamide  ¨ Birth control pills  ¨ Blood pressure medicine  ¨ Diuretic (water pill)  ¨ Phenothiazine medicine  ¨ Steroid medicine  ¨ Thyroid medicine  · Do not drink alcohol while you are using this medicine. Warnings While Using This Medicine:   · Tell your doctor if you are pregnant or breastfeeding, or if you have kidney disease, liver disease, heart or blood vessel disease, heart failure, blood circulation problems, anemia, metabolic acidosis, an adrenal gland or pituitary gland disorder, vitamin B12 deficiency, or had a heart attack. Tell your doctor if you drink alcohol. · Too much of this medicine can cause a rare, but serious condition called lactic acidosis. · Part of the extended-release tablet may pass in your stool. This is normal.  · Make sure any doctor or dentist who treats you knows that you are using this medicine.  You may need to stop using this medicine before you have surgery, an x-ray, CT scan, or other medical test.  · Your doctor will do lab tests at regular visits to check on the effects of this medicine. Keep all appointments. · Keep all medicine out of the reach of children. Never share your medicine with anyone. Possible Side Effects While Using This Medicine:   Call your doctor right away if you notice any of these side effects:  · Allergic reaction: Itching or hives, swelling in your face or hands, swelling or tingling in your mouth or throat, chest tightness, trouble breathing  · Confusion, fast heartbeat, increased hunger, shakiness  · Fever or chills  · Stomach pain, nausea, vomiting, muscle pain or cramping  · Trouble breathing, slow heartbeat, lightheadedness, dizziness  · Unusual tiredness or weakness  If you notice these less serious side effects, talk with your doctor:   · Diarrhea, gas  If you notice other side effects that you think are caused by this medicine, tell your doctor. Call your doctor for medical advice about side effects. You may report side effects to FDA at 1-280-FDA-2753  © 2017 [address] Information is for End User's use only and may not be sold, redistributed or otherwise used for commercial purposes. The above information is an  only. It is not intended as medical advice for individual conditions or treatments. Talk to your doctor, nurse or pharmacist before following any medical regimen to see if it is safe and effective for you. Follow-up Disposition:  Return in about 3 months (around 9/7/2018).     Copy sent to:  Dr. Blanka Giraldo

## 2018-06-07 NOTE — PATIENT INSTRUCTIONS
1) Metformin 500mg (one tablet) in the morning and 500mg with dinner. Take one pill with dinner for a week then increase to one with breakfast and one pill with dinner. Metformin (By mouth)   Metformin Hydrochloride (met-FOR-min ayana-droe-KLOR-corin)  Treats type 2 diabetes. Brand Name(s): DM2, Fortamet, Glucophage, Glucophage XR, Glumetza, Riomet   There may be other brand names for this medicine. When This Medicine Should Not Be Used: This medicine is not right for everyone. Do not use if you had an allergic reaction to metformin. How to Use This Medicine:   Liquid, Tablet, Long Acting Tablet  · Take your medicine as directed. Your dose may need to be changed several times to find what works best for you. · It is best to take this medicine with food or milk. · Swallow the extended-release tablet whole. Do not crush, break, or chew it. Tell your doctor if you have trouble swallowing the tablets whole. · Measure the oral liquid medicine with a marked measuring spoon, oral syringe, or medicine cup. · Read and follow the patient instructions that come with this medicine. Talk to your doctor or pharmacist if you have any questions. · Missed dose: Take a dose as soon as you remember. If it is almost time for your next dose, wait until then and take a regular dose. Do not take extra medicine to make up for a missed dose. · Store the medicine in a closed container at room temperature, away from heat, moisture, and direct light. Drugs and Foods to Avoid:   Ask your doctor or pharmacist before using any other medicine, including over-the-counter medicines, vitamins, and herbal products. · Some medicines can affect how metformin works.  Tell your doctor if you are using any of the following:  ¨ Acetazolamide, dichlorphenamide, dolutegravir, isoniazid, nicotinic acid, phenytoin, ranolazine, topiramate, vandetanib, zonisamide  ¨ Birth control pills  ¨ Blood pressure medicine  ¨ Diuretic (water pill)  ¨ Phenothiazine medicine  ¨ Steroid medicine  ¨ Thyroid medicine  · Do not drink alcohol while you are using this medicine. Warnings While Using This Medicine:   · Tell your doctor if you are pregnant or breastfeeding, or if you have kidney disease, liver disease, heart or blood vessel disease, heart failure, blood circulation problems, anemia, metabolic acidosis, an adrenal gland or pituitary gland disorder, vitamin B12 deficiency, or had a heart attack. Tell your doctor if you drink alcohol. · Too much of this medicine can cause a rare, but serious condition called lactic acidosis. · Part of the extended-release tablet may pass in your stool. This is normal.  · Make sure any doctor or dentist who treats you knows that you are using this medicine. You may need to stop using this medicine before you have surgery, an x-ray, CT scan, or other medical test.  · Your doctor will do lab tests at regular visits to check on the effects of this medicine. Keep all appointments. · Keep all medicine out of the reach of children. Never share your medicine with anyone. Possible Side Effects While Using This Medicine:   Call your doctor right away if you notice any of these side effects:  · Allergic reaction: Itching or hives, swelling in your face or hands, swelling or tingling in your mouth or throat, chest tightness, trouble breathing  · Confusion, fast heartbeat, increased hunger, shakiness  · Fever or chills  · Stomach pain, nausea, vomiting, muscle pain or cramping  · Trouble breathing, slow heartbeat, lightheadedness, dizziness  · Unusual tiredness or weakness  If you notice these less serious side effects, talk with your doctor:   · Diarrhea, gas  If you notice other side effects that you think are caused by this medicine, tell your doctor. Call your doctor for medical advice about side effects.  You may report side effects to FDA at 9-761-FDA-3993  © 2017 [facility] Information is for End User's use only and may not be sold, redistributed or otherwise used for commercial purposes. The above information is an  only. It is not intended as medical advice for individual conditions or treatments. Talk to your doctor, nurse or pharmacist before following any medical regimen to see if it is safe and effective for you.

## 2018-06-07 NOTE — TELEPHONE ENCOUNTER
Patient was here earlier for an appointment. Prescriptions were called into the [facility] in [place], but she went by there and they hadn't been called in there yet. She is on her way home now and would like us to have the Metformin, Blood Glucose Meter, and Test Strips, called into the [facility] in [place] instead. Patient can be reached at:  [phone]. Patient was on the road and didn't know the phone number to the [address] [place] [address] [facility].        Metformin  Blood Glucose Meter  Test Strips

## 2018-06-07 NOTE — TELEPHONE ENCOUNTER
[facility]. They are able to [facility] [place] and [facility] [place]. Patient has been advised.

## 2018-06-08 RX ORDER — INSULIN PUMP SYRINGE, 3 ML
EACH MISCELLANEOUS
Qty: 1 KIT | Refills: 0 | Status: SHIPPED | OUTPATIENT
Start: 2018-06-08 | End: 2018-07-19 | Stop reason: SDUPTHER

## 2018-06-08 NOTE — TELEPHONE ENCOUNTER
Patient called to say that [facility] would not accept her Meter and Test Strip prescription yesterday, because of Medicare restrictions, so she wants to know if you can call these into [facility]? She said a \"special code\" needs to be included in the prescription. Patient can be reached directly at:  [phone].       [facility]  [phone]  Glucose Meter  Test Strips

## 2018-06-26 ENCOUNTER — TELEPHONE (OUTPATIENT)
Dept: ENDOCRINOLOGY | Age: 68
End: 2018-06-26

## 2018-06-26 NOTE — TELEPHONE ENCOUNTER
Patient is calling to go over her lab results. She's still not feeling well and would like a call back. Her number is:  [phone].

## 2018-06-26 NOTE — TELEPHONE ENCOUNTER
Pt notes she has been feeling very week and she is concerned her thyroid or her adrenal gland may be off. Her PCP just did thyroid labs, she told her that her iodine was low. Her TSH was 5.690, her TT3 was 131 her rT3 wsa 9.6. She is currently taking Tirosint 50mg and Naturthroid 2-1/2 of the 32mg daily. She notes she has been back on the Trulicity. She notes her BGs have been doing well.     She has not had the Abdi Stim Test.   Pt given the number for the the infusion center to set up the abdi stim test.

## 2018-07-12 ENCOUNTER — HOSPITAL ENCOUNTER (OUTPATIENT)
Dept: INFUSION THERAPY | Age: 68
Discharge: HOME | End: 2018-07-12
Payer: MEDICARE

## 2018-07-12 VITALS
TEMPERATURE: 97.7 F | HEART RATE: 64 BPM | OXYGEN SATURATION: 97 % | DIASTOLIC BLOOD PRESSURE: 68 MMHG | RESPIRATION RATE: 18 BRPM | SYSTOLIC BLOOD PRESSURE: 126 MMHG

## 2018-07-12 LAB
CORTIS 1H P CHAL SERPL-MCNC: 31.5 UG/DL
CORTIS 30M P CHAL SERPL-MCNC: 24.3 UG/DL
CORTIS BS SERPL-MCNC: 17.2 UG/DL

## 2018-07-12 PROCEDURE — 36415 COLL VENOUS BLD VENIPUNCTURE: CPT | Performed by: INTERNAL MEDICINE

## 2018-07-12 PROCEDURE — 82533 TOTAL CORTISOL: CPT | Performed by: INTERNAL MEDICINE

## 2018-07-12 PROCEDURE — 82024 ASSAY OF ACTH: CPT | Performed by: INTERNAL MEDICINE

## 2018-07-12 PROCEDURE — 96374 THER/PROPH/DIAG INJ IV PUSH: CPT

## 2018-07-12 PROCEDURE — 74011250636 HC RX REV CODE- 250/636: Performed by: INTERNAL MEDICINE

## 2018-07-12 PROCEDURE — 74011000250 HC RX REV CODE- 250: Performed by: INTERNAL MEDICINE

## 2018-07-12 RX ORDER — SODIUM CHLORIDE 0.9 % (FLUSH) 0.9 %
10-40 SYRINGE (ML) INJECTION AS NEEDED
Status: ACTIVE | OUTPATIENT
Start: 2018-07-12 | End: 2018-07-13

## 2018-07-12 RX ADMIN — Medication 10 ML: at 08:52

## 2018-07-12 RX ADMIN — SODIUM CHLORIDE 0.25 MG: 9 INJECTION INTRAMUSCULAR; INTRAVENOUS; SUBCUTANEOUS at 08:45

## 2018-07-12 NOTE — PROGRESS NOTES
Outpatient Infusion Center Progress Note    0815 Pt admit to [facility] for Cortisol Stimulation Test ambulatory in stable condition. Assessment completed. No new concerns voiced. #22G PIV established in left AC, positive blood return. Baseline ACTH and Cortisol drawn from PIV start. Medications:  Cortrosyn 0.25 MG - slow IVP( given at 8:45am)    30 minute cortisol drawn at 9:15am  60 minute cortisol drawn at 9:45am    1000 Pt tolerated treatment well. D/c home ambulatory in no distress. Today completed treatment,  patient does not have any future OPI appointments at this time.    Patient Vitals for the past 12 hrs:   Temp Pulse Resp BP SpO2   07/12/18 0822 97.7 °F (36.5 °C) 64 18 126/68 97 %

## 2018-07-13 LAB — ACTH PLAS-MCNC: 36.6 PG/ML (ref 7.2–63.3)

## 2018-07-17 RX ORDER — DULAGLUTIDE 0.75 MG/.5ML
0.75 INJECTION, SOLUTION SUBCUTANEOUS
Qty: 4 PEN | Refills: 5 | Status: SHIPPED | OUTPATIENT
Start: 2018-07-17 | End: 2018-07-17 | Stop reason: SDUPTHER

## 2018-07-17 NOTE — TELEPHONE ENCOUNTER
Per last OV: 1) Impaired Glucose tolerance > Pt instructed to stop the Trulicity as it was causing nausea and abdominal cramping.

## 2018-07-17 NOTE — TELEPHONE ENCOUNTER
Patient is requesting to go back on trulicity and would like a [facility]ill [facility].

## 2018-07-19 RX ORDER — DULAGLUTIDE 0.75 MG/.5ML
0.75 INJECTION, SOLUTION SUBCUTANEOUS
Qty: 4 PEN | Refills: 5 | Status: SHIPPED | OUTPATIENT
Start: 2018-07-19 | End: 2019-03-25

## 2018-07-19 RX ORDER — INSULIN PUMP SYRINGE, 3 ML
EACH MISCELLANEOUS
Qty: 1 KIT | Refills: 0 | Status: SHIPPED | OUTPATIENT
Start: 2018-07-19 | End: 2018-09-17 | Stop reason: SDUPTHER

## 2018-07-23 ENCOUNTER — TELEPHONE (OUTPATIENT)
Dept: NEUROLOGY | Age: 68
End: 2018-07-23

## 2018-07-23 NOTE — TELEPHONE ENCOUNTER
Good morning,     We received a message from [facility]. \"Patient has an appt on Tuesday, but her  is having surgery that day and needs to reschedule. \"      Patient sees Dr. Michelle Jo    [phone]

## 2018-07-26 ENCOUNTER — TELEPHONE (OUTPATIENT)
Dept: NEUROLOGY | Age: 68
End: 2018-07-26

## 2018-07-26 NOTE — TELEPHONE ENCOUNTER
----- Message from Sujatha Breen sent at 7/26/2018 11:02 AM EDT -----  Regarding: Dr. Jesus Alberto Foster  Pt request a call back form the practice to reschedule an appt with the doctor sooner than what is currently available. Best contact number is [phone].

## 2018-07-31 ENCOUNTER — OFFICE VISIT (OUTPATIENT)
Dept: NEUROLOGY | Age: 68
End: 2018-07-31

## 2018-07-31 VITALS
SYSTOLIC BLOOD PRESSURE: 132 MMHG | HEIGHT: 66 IN | RESPIRATION RATE: 16 BRPM | BODY MASS INDEX: 41.3 KG/M2 | WEIGHT: 257 LBS | HEART RATE: 93 BPM | DIASTOLIC BLOOD PRESSURE: 66 MMHG | OXYGEN SATURATION: 96 %

## 2018-07-31 DIAGNOSIS — G56.03 CARPAL TUNNEL SYNDROME, BILATERAL UPPER LIMBS: ICD-10-CM

## 2018-07-31 DIAGNOSIS — M48.062 LUMBAR STENOSIS WITH NEUROGENIC CLAUDICATION: Primary | ICD-10-CM

## 2018-07-31 DIAGNOSIS — M79.7 FIBROMYALGIA: ICD-10-CM

## 2018-07-31 DIAGNOSIS — M47.22 CERVICAL RADICULOPATHY DUE TO DEGENERATIVE JOINT DISEASE OF SPINE: ICD-10-CM

## 2018-07-31 RX ORDER — DULOXETIN HYDROCHLORIDE 20 MG/1
20 CAPSULE, DELAYED RELEASE ORAL DAILY
Qty: 30 CAP | Refills: 5 | Status: SHIPPED | OUTPATIENT
Start: 2018-07-31 | End: 2019-03-25

## 2018-07-31 NOTE — PROGRESS NOTES
Chief Complaint Patient presents with  Dizziness  Muscle Pain 1. Have you been to the ER, urgent care clinic since your last visit? Hospitalized since your last visit? No 
 
2. Have you seen or consulted any other health care providers outside of the [address] [place] [address] since your last visit? Include any pap smears or colon screening. No  
 
Visit Vitals  /76  Pulse 93  Resp 16  
 Ht 5' 5.5\" (1.664 m)  Wt 116.6 kg (257 lb)  SpO2 96%  BMI 42.12 kg/m2 Instructed patient to let her PCP know about the elevated B/P. Dr. Regine Whiting aware.

## 2018-07-31 NOTE — PROGRESS NOTES
Name:  Doreen Gibbs 
:  1950 MRN:  [id number] PCP:  Lalo Cantu MD 
 
Chief Complaint Patient presents with  Dizziness  Muscle Pain HISTORY OF PRESENT ILLNESS: 
Dylan Pettit is a 77 y.o., female who presents today for follow up for headaches, dizziness and numbness and tingling in both hands. We did refer her to Dr. Marissa Stanford for possible surgery. He didn't think she needed it at this point. She is having a flare of Shivam Barr Virus. Her level was over 600. She has been doing vitamin supplements for this. She is also been diagnosed with fibromyaglia. She has continued pain in her low back. She has pain down her right leg. She has to turn her right leg to the side. She has been trying creams and aspirin. She has not been able to walk or stand well. She can't cook or clean. She has started back with PT. Last Thursday and Friday she had a TENS unit type of device on her right leg and this helped her pain significantly. She also sees Dr. Oli Senior for her elevated blood sugars/metabolic syndrome and he is adjusting medications. She does have a history of breast care. She did try gabapentin but she thinks it made her feel funny the next day but didn't help the pain. She only tried it for 3 days. She says she had really bad pain with the Lyrica in the past. 
 
 
Recap: She is still having some sciatic pain. This is mostly on the right side. Changing positions when she has pain is helpful. She had an MRI L spine with several discs at L4-5. She also has some right sided foraminal narrowing. She doesn't like to lie on her back. She does have stiffness from her fibromyalgia. She is going to PT again to help her. She is doing twice a week for this. It is focused on her right leg and back. She has her knee taped and this dose help her. She follows with rheumatology. She does have RLS. She does stretching exercises for her fibro.   
She is using a electric carts when she goes places due to weakness and pains. She is not resting well secondary to pain. Imaging: MRI lumbar spine: Focal areas of disc disease with disc extrusion at L4/5 (I personally reviewed these images in provided CD and this is my impression) Current Outpatient Prescriptions Medication Sig  TRULICITY 5.17 SV/8.2 mL sub-q pen 0.5 mL by SubCUTAneous route every seven (7) days.  glucose blood VI test strips (ONETOUCH VERIO) strip Check blood sugar twice per day. DX: R73.02  Blood-Glucose Meter (ONETOUCH VERIO FLEX START) monitoring kit Test blood sugars twice per day. DX:R73.02  
 thyroid, Pork, (NATURE-THROID/WESTHROID) 32.5 mg tablet Take  by mouth daily. Takes 81.2 mg/day  cholecalciferol, VITAMIN D3, (VITAMIN D3) 5,000 unit tab tablet Take 1 tablet every day by oral route in the morning.  thyroid, pork, (NATURE-THROID) 81.25 mg tab Take 1 tablet every day by oral route in the morning.  MELATONIN-LEMON BALM LEAF EXTR PO Take  by mouth. 2 at bedtime  OTHER,NON-FORMULARY, MonoPure 1300 EC(Omega XYMogen), ActivNutrients no iron 2/day (MVI/Mineral formula; Natural D-Hist 2/day CandiBactin-AR, Vitamin D3+K2-1 drop per day (500 iu); Minerals 1 drop a day;SACO-B, XLEAR 1 sprayper day; Formula 3 (toes)  metFORMIN ER (GLUCOPHAGE XR) 500 mg tablet Take 1 Tab by mouth two (2) times a day for 360 days. One pill with breakfast and one pill with dinner  VENTOLIN HFA 90 mcg/actuation inhaler Take 1 Puff by inhalation every four (4) hours as needed for Wheezing.  cetirizine (ZYRTEC) 10 mg tablet Take 0.5-1 Tabs by mouth daily.  TIROSINT 50 mcg cap daily.  LACTOBACILLUS ACIDOPHILUS (PROBIOTIC PO) Take  by mouth.  MAGNESIUM GLYCINATE, BULK, by Does Not Apply route. No current facility-administered medications for this visit. Allergies Allergen Reactions  Adhes. Band-Tape-Benzalkonium Swelling  Adhesive Swelling  Dairy Aid [Lactase] Unknown (comments)  Gluten Diarrhea \"brain fog\", aching  Iodinated Contrast- Oral And Iv Dye Other (comments) Gets dizzy and shakey  Milk Containing Products Other (comments) Aches and stomach cramps  Motrin [Ibuprofen] Unknown (comments)  Other Medication Other (comments) Super sensitive to all medication  Soy Unknown (comments)  Sulfa (Sulfonamide Antibiotics) Unable to Obtain  Wheat Unknown (comments) Past Medical History:  
Diagnosis Date  Chest pain, unspecified  Chronic fatigue syndrome 3/16/2012  Fibromyalgia  Impaired glucose tolerance  Other dyspnea and respiratory abnormality  Palpitations  Shortness of breath  Skin cancer 3/16/2012  Unspecified hypothyroidism   
 goiter  Unspecified vitamin D deficiency Past Surgical History:  
Procedure Laterality Date  HX BREAST LUMPECTOMY  7/2013  
 right breast  
 HX TONSILLECTOMY  HX TONSILLECTOMY  HX UROLOGICAL    
 urethral opening was widened Social History Social History  Marital status:  Spouse name: N/A  
 Number of children: N/A  
 Years of education: N/A Occupational History  Not on file. Social History Main Topics  Smoking status: Never Smoker  Smokeless tobacco: Never Used  Alcohol use No  
   Comment: Rare  Drug use: No  
 Sexual activity: Yes  
  Partners: Male Other Topics Concern  Not on file Social History Narrative Lives in [facility] with . Used to work as a  for [employer]. Family History Problem Relation Age of Onset  Coronary Artery Disease Mother  Thyroid Disease Mother  Diabetes Mother  Cancer Mother Multiple Myeloma  Cancer Father Colon cancer  Arthritis-osteo Father  Heart Disease Father  High Cholesterol Brother  Thyroid Disease Brother   
  thyroid cancer  Cancer Brother Thyroid and Skin  Diabetes Maternal Aunt   
 Hypertension Other  Heart Disease Other  Depression Other  Anxiety Other  Cancer Sister Skin  Cancer Brother Multiple Myeloma and skin  Cancer Brother Skin Labs: EBV AB IGM- <36 EBV IGG AB >600 PHYSICAL EXAMINATION:   
Visit Vitals  /76  Pulse 93  Resp 16  
 Ht 5' 5.5\" (1.664 m)  Wt 116.6 kg (257 lb)  SpO2 96%  BMI 42.12 kg/m2 General:  Well defined, nourished, and groomed individual in no acute distress. Neck: Supple, nontender, no bruits, no pain with resistance to active range of motion. Heart: Regular rate and rhythm, no murmurs, rub, or gallop. Normal S1S2. Lungs:  Clear to auscultation bilaterally with equal chest expansion, no cough, no wheeze Musculoskeletal:  Extremities revealed no edema and had full range of motion of joints. Psych: Normal 
 
NEUROLOGICAL EXAMINATION:    
Mental Status:   Alert and oriented to person, place, and time with recent and remote memory intact. Attention span and concentration are normal. Speech is fluent with a full fund of knowledge. Cranial Nerves:   
II, III, IV, VI:  Visual acuity grossly intact. Pupils are equal, round, and reactive to light. Extra-ocular movements are full and fluid. No ptosis or nystagmus. V-XII: Hearing is grossly intact. Facial features are symmetric, with normal sensation and strength. The palate rises symmetrically and the tongue protrudes midline. Sternocleidomastoids 5/5. Motor Examination: Normal tone, bulk, and strength, 5/5 in UE except 4/5 bilateral IHM, 4/5 in BLE Coordination:  Finger to nose testing was normal.   No resting or intention tremor Gait and Station:  Wide based ASSESSMENT AND PLAN 
 
68-year-old female seen in follow-up headaches, carpal tunnel syndrome, and insomnia. Her main issue today is her back pain and pain down her legs. It is making her have difficulty with even being able to sleep.   She has been started on low-dose naltrexone but has not started it yet. Previously she has tried gabapentin and Lyrica with side effects. She is willing to try something else, so we will try Cymbalta. She is already had an MRI of the lumbar spine that shows disks at L4 and 5. She had an EMG/NCS a year and a half ago so we will repeat this at this time to see if there has been any new nerve damage. 1.  Patient is seen Dr. Avis Griffin with neurosurgery but he wants to wait on any surgical options at this time 2. We will check EMG/NCS of bilateral lower extremities. Patient will have this done at [address] [place] [address] 
3. Will do a naltrexone 2.5 mg tablet ×14 days and patient will increase to the 5 mg tablet that she is already gotten from rheumatology. 4.  Discussed that we can do referral for neuropsychological testing, but we will wait to see if patient improves with medication adjustment 5. After being on naltrexone for 1 month she will start Cymbalta 20 mg daily. Side effects discussed. Follow-up in 3 months Josh Phan MD 
 
Over 70 minutes was spent with the patient of which 50 minutes of the visit was spent counseling on diagnosis, management, and treatment of the diagnosis of lumbar stenosis. We also discussed combinations of medications and when it may be appropriate to do additional imaging. Also reviewed her previous specialist that she is seen since our last visit in detail. Medications and side effects discussed with patient in detail. With any new medications prescribed, patient was given instructions on administration and side effects. Written medication information was provided to the patient as well. This note was created using voice recognition software. Despite editing, there may be syntax errors. This note will not be viewable in [address]

## 2018-07-31 NOTE — LETTER
Name:  Munira Garrido 
:  1950 MRN:  [id number] PCP:  Luan Sol MD 
 
Chief Complaint Patient presents with  Dizziness  Muscle Pain HISTORY OF PRESENT ILLNESS: 
Светлана Lopez is a 77 y.o., female who presents today for follow up for headaches, dizziness and numbness and tingling in both hands. We did refer her to Dr. Mane Herrera for possible surgery. He didn't think she needed it at this point. She is having a flare of Shivam Barr Virus. Her level was over 600. She has been doing vitamin supplements for this. She is also been diagnosed with fibromyaglia. She has continued pain in her low back. She has pain down her right leg. She has to turn her right leg to the side. She has been trying creams and aspirin. She has not been able to walk or stand well. She can't cook or clean. She has started back with PT. Last Thursday and Friday she had a TENS unit type of device on her right leg and this helped her pain significantly. She also sees Dr. Radha Cuello for her elevated blood sugars/metabolic syndrome and he is adjusting medications. She does have a history of breast care. She did try gabapentin but she thinks it made her feel funny the next day but didn't help the pain. She only tried it for 3 days. She says she had really bad pain with the Lyrica in the past. 
 
 
Recap: She is still having some sciatic pain. This is mostly on the right side. Changing positions when she has pain is helpful. She had an MRI L spine with several discs at L4-5. She also has some right sided foraminal narrowing. She doesn't like to lie on her back. She does have stiffness from her fibromyalgia. She is going to PT again to help her. She is doing twice a week for this. It is focused on her right leg and back. She has her knee taped and this dose help her. She follows with rheumatology. She does have RLS. She does stretching exercises for her fibro. She is using a electric carts when she goes places due to weakness and pains. She is not resting well secondary to pain. Imaging: MRI lumbar spine: Focal areas of disc disease with disc extrusion at L4/5 (I personally reviewed these images in provided CD and this is my impression) Current Outpatient Prescriptions Medication Sig  TRULICITY 6.18 NV/5.3 mL sub-q pen 0.5 mL by SubCUTAneous route every seven (7) days.  glucose blood VI test strips (ONETOUCH VERIO) strip Check blood sugar twice per day. DX: R73.02  Blood-Glucose Meter (ONETOUCH VERIO FLEX START) monitoring kit Test blood sugars twice per day. DX:R73.02  
 thyroid, Pork, (NATURE-THROID/WESTHROID) 32.5 mg tablet Take  by mouth daily. Takes 81.2 mg/day  cholecalciferol, VITAMIN D3, (VITAMIN D3) 5,000 unit tab tablet Take 1 tablet every day by oral route in the morning.  thyroid, pork, (NATURE-THROID) 81.25 mg tab Take 1 tablet every day by oral route in the morning.  MELATONIN-LEMON BALM LEAF EXTR PO Take  by mouth. 2 at bedtime  OTHER,NON-FORMULARY, MonoPure 1300 EC(Omega XYMogen), ActivNutrients no iron 2/day (MVI/Mineral formula; Natural D-Hist 2/day CandiBactin-AR, Vitamin D3+K2-1 drop per day (500 iu); Minerals 1 drop a day;SACO-B, XLEAR 1 sprayper day; Formula 3 (toes)  metFORMIN ER (GLUCOPHAGE XR) 500 mg tablet Take 1 Tab by mouth two (2) times a day for 360 days. One pill with breakfast and one pill with dinner  VENTOLIN HFA 90 mcg/actuation inhaler Take 1 Puff by inhalation every four (4) hours as needed for Wheezing.  cetirizine (ZYRTEC) 10 mg tablet Take 0.5-1 Tabs by mouth daily.  TIROSINT 50 mcg cap daily.  LACTOBACILLUS ACIDOPHILUS (PROBIOTIC PO) Take  by mouth.  MAGNESIUM GLYCINATE, BULK, by Does Not Apply route. No current facility-administered medications for this visit. Allergies Allergen Reactions  Adhes. Band-Tape-Benzalkonium Swelling  Adhesive Swelling  Dairy Aid [Lactase] Unknown (comments)  Gluten Diarrhea \"brain fog\", aching  Iodinated Contrast- Oral And Iv Dye Other (comments) Gets dizzy and shakey  Milk Containing Products Other (comments) Aches and stomach cramps  Motrin [Ibuprofen] Unknown (comments)  Other Medication Other (comments) Super sensitive to all medication  Soy Unknown (comments)  Sulfa (Sulfonamide Antibiotics) Unable to Obtain  Wheat Unknown (comments) Past Medical History:  
Diagnosis Date  Chest pain, unspecified  Chronic fatigue syndrome 3/16/2012  Fibromyalgia  Impaired glucose tolerance  Other dyspnea and respiratory abnormality  Palpitations  Shortness of breath  Skin cancer 3/16/2012  Unspecified hypothyroidism   
 goiter  Unspecified vitamin D deficiency Past Surgical History:  
Procedure Laterality Date  HX BREAST LUMPECTOMY  7/2013  
 right breast  
 HX TONSILLECTOMY  HX TONSILLECTOMY  HX UROLOGICAL    
 urethral opening was widened Social History Social History  Marital status:  Spouse name: N/A  
 Number of children: N/A  
 Years of education: N/A Occupational History  Not on file. Social History Main Topics  Smoking status: Never Smoker  Smokeless tobacco: Never Used  Alcohol use No  
   Comment: Rare  Drug use: No  
 Sexual activity: Yes  
  Partners: Male Other Topics Concern  Not on file Social History Narrative Lives in [facility] with . Used to work as a  for [employer]. Family History Problem Relation Age of Onset  Coronary Artery Disease Mother  Thyroid Disease Mother  Diabetes Mother  Cancer Mother Multiple Myeloma  Cancer Father Colon cancer  Arthritis-osteo Father  Heart Disease Father  High Cholesterol Brother  Thyroid Disease Brother   
  thyroid cancer  Cancer Brother Thyroid and Skin  Diabetes Maternal Aunt  Hypertension Other  Heart Disease Other  Depression Other  Anxiety Other  Cancer Sister Skin  Cancer Brother Multiple Myeloma and skin  Cancer Brother Skin Labs: EBV AB IGM- <36 EBV IGG AB >600 PHYSICAL EXAMINATION:   
Visit Vitals  /76  Pulse 93  Resp 16  
 Ht 5' 5.5\" (1.664 m)  Wt 116.6 kg (257 lb)  SpO2 96%  BMI 42.12 kg/m2 General:  Well defined, nourished, and groomed individual in no acute distress. Neck: Supple, nontender, no bruits, no pain with resistance to active range of motion. Heart: Regular rate and rhythm, no murmurs, rub, or gallop. Normal S1S2. Lungs:  Clear to auscultation bilaterally with equal chest expansion, no cough, no wheeze Musculoskeletal:  Extremities revealed no edema and had full range of motion of joints. Psych: Normal 
 
NEUROLOGICAL EXAMINATION:    
Mental Status:   Alert and oriented to person, place, and time with recent and remote memory intact. Attention span and concentration are normal. Speech is fluent with a full fund of knowledge. Cranial Nerves:   
II, III, IV, VI:  Visual acuity grossly intact. Pupils are equal, round, and reactive to light. Extra-ocular movements are full and fluid. No ptosis or nystagmus. V-XII: Hearing is grossly intact. Facial features are symmetric, with normal sensation and strength. The palate rises symmetrically and the tongue protrudes midline. Sternocleidomastoids 5/5. Motor Examination: Normal tone, bulk, and strength, 5/5 in UE except 4/5 bilateral IHM, 4/5 in BLE Coordination:  Finger to nose testing was normal.   No resting or intention tremor Gait and Station:  Wide based ASSESSMENT AND PLAN 
 
68-year-old female seen in follow-up headaches, carpal tunnel syndrome, and insomnia.   Her main issue today is her back pain and pain down her legs.  It is making her have difficulty with even being able to sleep. She has been started on low-dose naltrexone but has not started it yet. Previously she has tried gabapentin and Lyrica with side effects. She is willing to try something else, so we will try Cymbalta. She is already had an MRI of the lumbar spine that shows disks at L4 and 5. She had an EMG/NCS a year and a half ago so we will repeat this at this time to see if there has been any new nerve damage. 1.  Patient is seen Dr. Adriana Novak with neurosurgery but he wants to wait on any surgical options at this time 2. We will check EMG/NCS of bilateral lower extremities. Patient will have this done at [address] [place] [address] 
3. Will do a naltrexone 2.5 mg tablet ×14 days and patient will increase to the 5 mg tablet that she is already gotten from rheumatology. 4.  Discussed that we can do referral for neuropsychological testing, but we will wait to see if patient improves with medication adjustment 5. After being on naltrexone for 1 month she will start Cymbalta 20 mg daily. Side effects discussed. Follow-up in 3 months Aylin Rivera MD 
 
Over 70 minutes was spent with the patient of which 50 minutes of the visit was spent counseling on diagnosis, management, and treatment of the diagnosis of lumbar stenosis. We also discussed combinations of medications and when it may be appropriate to do additional imaging. Also reviewed her previous specialist that she is seen since our last visit in detail. Medications and side effects discussed with patient in detail. With any new medications prescribed, patient was given instructions on administration and side effects. Written medication information was provided to the patient as well. This note was created using voice recognition software. Despite editing, there may be syntax errors. This note will not be viewable in [address] Chief Complaint Patient presents with  Dizziness  Muscle Pain 1. Have you been to the ER, urgent care clinic since your last visit? Hospitalized since your last visit? No 
 
2. Have you seen or consulted any other health care providers outside of the [facility] since your last visit? Include any pap smears or colon screening. No  
 
Visit Vitals  /76  Pulse 93  Resp 16  
 Ht 5' 5.5\" (1.664 m)  Wt 116.6 kg (257 lb)  SpO2 96%  BMI 42.12 kg/m2 Instructed patient to let her PCP know about the elevated B/P. Dr. Ruddy Ramirez aware.

## 2018-07-31 NOTE — MR AVS SNAPSHOT
[address] [facility] [address] [place] [place] [facility] [id number] [phone] Patient: Dylan Pettit MRN: [id number] Visit Information Date & Time Provider Department Dept. Phone Encounter #  
 7/31/2018  3:40 PM Jenn Green MD [facility] Neurology [facility] [phone] [id number] Follow-up Instructions Return in about 3 months (around 10/31/2018). Your Appointments 9/11/2018 11:50 AM  
Follow Up with Ranulfo Bassett MD  
[facility] Appt Note: 3 month f/u  Diabetes [address] [id number] [address] Upcoming Health Maintenance Date Due Hepatitis C Screening 1950 DTaP/Tdap/Td series (1 - Tdap) 4/21/1969 FOBT Q 1 YEAR AGE 50-75 4/21/1998 ZOSTER VACCINE AGE 60> 2/21/2008 GLAUCOMA SCREENING Q2Y 4/21/2013 Pneumococcal 65+ High/Highest Risk (1 of 2 - PCV13) 4/21/2013 BREAST CANCER SCRN MAMMOGRAM 2/25/2015 MEDICARE YEARLY EXAM 3/14/2018 Influenza Age 5 to Adult 8/1/2018 Allergies as of 7/31/2018  Review Complete On: 7/12/2018 By: Jasvir Dominguez RN Severity Noted Reaction Type Reactions Adhes. Band-tape-benzalkonium  01/25/2012    Swelling Adhesive  04/01/2015    Swelling Dairy Aid [Lactase]  04/12/2012    Unknown (comments) Gluten  05/28/2015    Diarrhea \"brain fog\", aching Iodinated Contrast- Oral And Iv Dye  05/28/2015    Other (comments) Gets dizzy and shakey Milk Containing Products  05/28/2015    Other (comments) Aches and stomach cramps Motrin [Ibuprofen]  04/12/2012    Unknown (comments) Other Medication  05/28/2015    Other (comments) Super sensitive to all medication Soy  04/12/2012    Unknown (comments) Sulfa (Sulfonamide Antibiotics)  03/16/2012    Unable to Obtain Wheat  04/12/2012    Unknown (comments) Current Immunizations  Reviewed on 7/12/2018 No immunizations on file. Not reviewed this visit You Were Diagnosed With   
  
 Codes Comments Lumbar stenosis with neurogenic claudication    -  Primary ICD-10-CM: Y07.907 
ICD-9-CM: 724.03 Cervical radiculopathy due to degenerative joint disease of spine     ICD-10-CM: M47.22 
ICD-9-CM: 721.0 Carpal tunnel syndrome, bilateral upper limbs     ICD-10-CM: G56.03 
ICD-9-CM: 354.0 Vitals BP Pulse Resp Height(growth percentile) Weight(growth percentile) SpO2  
 146/76 93 16 5' 5.5\" (1.664 m) 257 lb (116.6 kg) 96% BMI OB Status Smoking Status 42.12 kg/m2 Postmenopausal Never Smoker BMI and BSA Data Body Mass Index Body Surface Area  
 42.12 kg/m 2 2.32 m 2 Preferred Pharmacy Pharmacy Name Phone [address], [address] [place] [address] [phone] Your Updated Medication List  
  
   
This list is accurate as of 7/31/18  4:46 PM.  Always use your most recent med list.  
  
  
  
  
 Blood-Glucose Meter monitoring kit Commonly known as:  ONETOUCH VERIO FLEX START Test blood sugars twice per day. DX:R73.02  
  
 cetirizine 10 mg tablet Commonly known as:  ZyrTEC Take 0.5-1 Tabs by mouth daily. cholecalciferol (VITAMIN D3) 5,000 unit Tab tablet Commonly known as:  VITAMIN D3 Take 1 tablet every day by oral route in the morning. DULoxetine 20 mg capsule Commonly known as:  CYMBALTA Take 1 Cap by mouth daily. glucose blood VI test strips strip Commonly known as:  Deirdre Gibbons Check blood sugar twice per day. DX: R73.02  
  
 MAGNESIUM GLYCINATE (BULK)  
by Does Not Apply route. MELATONIN-LEMON BALM LEAF EXTR PO Take  by mouth. 2 at bedtime  
  
 metFORMIN  mg tablet Commonly known as:  GLUCOPHAGE XR  
 Take 1 Tab by mouth two (2) times a day for 360 days. One pill with breakfast and one pill with dinner OTHER Take 70 mg by mouth daily. Indications: Progestrone OTHER(NON-FORMULARY) MonoPure 1300 EC(Omega XYMogen), ActivNutrients no iron 2/day (MVI/Mineral formula; Natural D-Hist 2/day CandiBactin-AR, Vitamin D3+K2-1 drop per day (500 iu); Minerals 1 drop a day;SACO-B, XLEAR 1 sprayper day; Formula 3 (toes) PROBIOTIC PO Take  by mouth. * thyroid (Pork) 32.5 mg tablet Commonly known as:  NATURE-THROID/WESTHROID Take  by mouth daily. Takes 81.2 mg/day * NATURE-THROID 81.25 mg Tab Generic drug:  thyroid (pork) Take 1 tablet every day by oral route in the morning. TIROSINT 50 mcg Cap Generic drug:  Levothyroxine  
daily. TRULICITY 6.56 QR/9.5 mL sub-q pen Generic drug:  dulaglutide 0.5 mL by SubCUTAneous route every seven (7) days. VENTOLIN HFA 90 mcg/actuation inhaler Generic drug:  albuterol Take 1 Puff by inhalation every four (4) hours as needed for Wheezing. * Notice: This list has 2 medication(s) that are the same as other medications prescribed for you. Read the directions carefully, and ask your doctor or other care provider to review them with you. Prescriptions Sent to Pharmacy Refills DULoxetine (CYMBALTA) 20 mg capsule 5 Sig: Take 1 Cap by mouth daily. Class: Normal  
 Pharmacy: [facility] DR YOGESH TANG [address] #: [phone] Route: Oral  
  
Follow-up Instructions Return in about 3 months (around 10/31/2018). To-Do List   
 08/01/2018 Neurology:  EMG LIMITED Patient Instructions PRESCRIPTION REFILL POLICY [facility] Statement to Patients April 1, 2014 In an effort to ensure the large volume of patient prescription refills is processed in the most efficient and expeditious manner, we are asking our patients to assist us by calling your Pharmacy for all prescription refills, this will include also your  Mail Order Pharmacy. The pharmacy will contact our office electronically to continue the refill process. Please do not wait until the last minute to call your pharmacy. We need at least 48 hours (2days) to fill prescriptions. We also encourage you to call your pharmacy before going to  your prescription to make sure it is ready. With regard to controlled substance prescription refill requests (narcotic refills) that need to be picked up at our office, we ask your cooperation by providing us with at least 72 hours (3days) notice that you will need a refill. We will not refill narcotic prescription refill requests after 4:00pm on any weekday, Monday through Thursday, or after 2:00pm on Fridays, or on the weekends. We encourage everyone to explore another way of getting your prescription refill request processed using [employer], our patient web portal through our electronic medical record system. [employer] is an efficient and effective way to communicate your medication request directly to the office and  downloadable as an eliz on your smart phone . [employer] also features a review functionality that allows you to view your medication list as well as leave messages for your physician. Are you ready to get connected? If so please review the attatched instructions or speak to any of our staff to get you set up right away! Thank you so much for your cooperation. Should you have any questions please contact our Practice Administrator. The Physicians and Staff,  [facility] Patient Instruction Plan/ Result Policy If we have ordered testing for you, know that; \"NO NEWS IS GOOD NEWS! \" It is our policy that we know longer call patients with results, nor do we  give test results over the phone.   We schedule follow up appointments so that your results can be discussed in person. This allows you to address any questions you have regarding the results. If something of concern is revealed on your test, we will contact you to discuss the matter and if needed schedule a sooner follow up appointment. Additionally, results may be found by using the My Chart feature and one of our patient service representatives at the  can give you instructions on how to access this feature to utilize our electronic medical record system. Thank you for your understanding. Patient Instructions/Plans: 
· Please try the naltrexone 2.5mg daily for 2 weeks then take 5mg for 2 weeks · After being on this for one month if you are doing well then start they Cymbalta 20mg daily Duloxetine (By mouth) Duloxetine (doo-LOX-e-teen) Treats depression, anxiety, diabetic peripheral neuropathy, fibromyalgia, and chronic muscle or bone pain. This medicine is an SSNRI. Brand Name(s): Cymbalta, DermacinRx DPN Dwaine Christensen There may be other brand names for this medicine. When This Medicine Should Not Be Used: This medicine is not right for everyone. Do not use it if you had an allergic reaction to duloxetine. How to Use This Medicine:  
Capsule, Delayed Release Capsule Take your medicine as directed. Your dose may need to be changed several times to find what works best for you. Delayed-release capsule: Swallow the capsule whole. Do not crush, chew, break, or open it. This medicine should come with a Medication Guide. Ask your pharmacist for a copy if you do not have one. Missed dose: Take a dose as soon as you remember. If it is almost time for your next dose, wait until then and take a regular dose. Do not take extra medicine to make up for a missed dose. Store the medicine in a closed container at room temperature, away from heat, moisture, and direct light. Drugs and Foods to Avoid: Ask your doctor or pharmacist before using any other medicine, including over-the-counter medicines, vitamins, and herbal products. Do not take duloxetine if you have used an MAO inhibitor (MAOI) within the past 14 days. Do not start taking an MAO inhibitor within 5 days of stopping duloxetine. Some medicines can affect how duloxetine works. Tell your doctor if you are using any of the following: Buspirone, cimetidine, ciprofloxacin, enoxacin, fentanyl, lithium, Capri's wort, theophylline, tramadol, tryptophan, or warfarin Amphetamines Blood pressure medicine Diuretic (water pill) Medicine for heart rhythm problems (including flecainide, propafenone, quinidine) Medicine to treat migraine headaches (including triptans) NSAID pain or arthritis medicine (including aspirin, celecoxib, diclofenac, ibuprofen, naproxen) Other medicine to treat depression or mood disorders (including amitriptyline, desipramine, fluoxetine, imipramine, nortriptyline, paroxetine) Phenothiazine medicine (including thioridazine) Tell your doctor if you use anything else that makes you sleepy. Some examples are allergy medicine, narcotic pain medicine, and alcohol. Do not drink alcohol while you are using this medicine. Warnings While Using This Medicine:  
Tell your doctor if you are pregnant or breastfeeding, or if you have kidney disease, liver disease, diabetes, digestion problems, glaucoma, heart disease, high or low blood pressure, or problems with urination. Tell your doctor if you smoke or you have a history of seizures, or drug or alcohol addiction. This medicine may cause the following problems:  
Serious liver problems Serotonin syndrome (more likely when used with certain other medicines) Increased risk of bleeding problems Serious skin reactions Low sodium levels in the blood This medicine can increase thoughts of suicide.  Tell your doctor right away if you start to feel depressed and have thoughts about hurting yourself. This medicine can cause changes in your blood pressure. This may make you dizzy or drowsy. Do not drive or do anything that could be dangerous until you know how this medicine affects you. Stand up slowly to avoid falls. Do not stop using this medicine suddenly. Your doctor will need to slowly decrease your dose before you stop it completely. Your doctor will check your progress and the effects of this medicine at regular visits. Keep all appointments. Keep all medicine out of the reach of children. Never share your medicine with anyone. Possible Side Effects While Using This Medicine:  
Call your doctor right away if you notice any of these side effects: Allergic reaction: Itching or hives, swelling in your face or hands, swelling or tingling in your mouth or throat, chest tightness, trouble breathing Anxiety, restlessness, fever, fast heartbeat, sweating, muscle spasms, diarrhea, seeing or hearing things that are not there Blistering, peeling, red skin rash Confusion, weakness, muscle twitching Dark urine or pale stools, nausea, vomiting, loss of appetite, stomach pain, yellow skin or eyes Decrease in how much or how often you urinate Eye pain, vision changes, seeing halos around lights Feeling more energetic than usual 
Lightheadedness, dizziness, or fainting Unusual moods or behaviors, worsening depression, thoughts about hurting yourself, trouble sleeping Unusual bleeding or bruising If you notice these less serious side effects, talk with your doctor:  
Decrease in appetite or weight Dry mouth, constipation, mild nausea Unusual drowsiness, sleepiness, or tiredness If you notice other side effects that you think are caused by this medicine, tell your doctor. Call your doctor for medical advice about side effects. You may report side effects to FDA at 2-850-TZW-0200 © 2017 [address] [facility] Information is for End User's use only and may not be sold, redistributed or otherwise used for commercial purposes. The above information is an  only. It is not intended as medical advice for individual conditions or treatments. Talk to your doctor, nurse or pharmacist before following any medical regimen to see if it is safe and effective for you. · Introducing [facility]! Dear Blayne [facility]: 
Thank you for requesting a [employer] account. Our records indicate that you already have an active [employer] account. You can access your account anytime at [email or web address]. [email or web address] Did you know that you can access your hospital and ER discharge instructions at any time in [employer]? You can also review all of your test results from your hospital stay or ER visit. Additional Information If you have questions, please visit the Frequently Asked Questions section of the [employer] website at [email or web address] Remember, [employer] is NOT to be used for urgent needs. For medical emergencies, dial 911. Now available from your iPhone and Android! Please provide this summary of care documentation to your next provider. Your primary care clinician is listed as [facility]. If you have any questions after today's visit, please call [phone].

## 2018-07-31 NOTE — PATIENT INSTRUCTIONS
PRESCRIPTION REFILL POLICY [facility] Statement to Patients April 1, 2014 In an effort to ensure the large volume of patient prescription refills is processed in the most efficient and expeditious manner, we are asking our patients to assist us by calling your Pharmacy for all prescription refills, this will include also your  Mail Order Pharmacy. The pharmacy will contact our office electronically to continue the refill process. Please do not wait until the last minute to call your pharmacy. We need at least 48 hours (2days) to fill prescriptions. We also encourage you to call your pharmacy before going to  your prescription to make sure it is ready. With regard to controlled substance prescription refill requests (narcotic refills) that need to be picked up at our office, we ask your cooperation by providing us with at least 72 hours (3days) notice that you will need a refill. We will not refill narcotic prescription refill requests after 4:00pm on any weekday, Monday through Thursday, or after 2:00pm on Fridays, or on the weekends. We encourage everyone to explore another way of getting your prescription refill request processed using [employer], our patient web portal through our electronic medical record system. [employer] is an efficient and effective way to communicate your medication request directly to the office and  downloadable as an eliz on your smart phone . [employer] also features a review functionality that allows you to view your medication list as well as leave messages for your physician. Are you ready to get connected? If so please review the attatched instructions or speak to any of our staff to get you set up right away! Thank you so much for your cooperation. Should you have any questions please contact our Practice Administrator. The Physicians and Staff,  [facility] Patient Instruction Plan/ Result Policy If we have ordered testing for you, know that; \"NO NEWS IS GOOD NEWS! \" It is our policy that we know longer call patients with results, nor do we  give test results over the phone. We schedule follow up appointments so that your results can be discussed in person. This allows you to address any questions you have regarding the results. If something of concern is revealed on your test, we will contact you to discuss the matter and if needed schedule a sooner follow up appointment. Additionally, results may be found by using the My Chart feature and one of our patient service representatives at the  can give you instructions on how to access this feature to utilize our electronic medical record system. Thank you for your understanding. Patient Instructions/Plans: 
· Please try the naltrexone 2.5mg daily for 2 weeks then take 5mg for 2 weeks · After being on this for one month if you are doing well then start they Cymbalta 20mg daily Duloxetine (By mouth) Duloxetine (doo-LOX-e-teen) Treats depression, anxiety, diabetic peripheral neuropathy, fibromyalgia, and chronic muscle or bone pain. This medicine is an SSNRI. Brand Name(s): Cymbalta, DermacinRx DPN Ambrosio Jackson There may be other brand names for this medicine. When This Medicine Should Not Be Used: This medicine is not right for everyone. Do not use it if you had an allergic reaction to duloxetine. How to Use This Medicine:  
Capsule, Delayed Release Capsule Take your medicine as directed. Your dose may need to be changed several times to find what works best for you. Delayed-release capsule: Swallow the capsule whole. Do not crush, chew, break, or open it. This medicine should come with a Medication Guide. Ask your pharmacist for a copy if you do not have one. Missed dose: Take a dose as soon as you remember. If it is almost time for your next dose, wait until then and take a regular dose.  Do not take extra medicine to make up for a missed dose. Store the medicine in a closed container at room temperature, away from heat, moisture, and direct light. Drugs and Foods to Avoid: Ask your doctor or pharmacist before using any other medicine, including over-the-counter medicines, vitamins, and herbal products. Do not take duloxetine if you have used an MAO inhibitor (MAOI) within the past 14 days. Do not start taking an MAO inhibitor within 5 days of stopping duloxetine. Some medicines can affect how duloxetine works. Tell your doctor if you are using any of the following: Buspirone, cimetidine, ciprofloxacin, enoxacin, fentanyl, lithium, Capri's wort, theophylline, tramadol, tryptophan, or warfarin Amphetamines Blood pressure medicine Diuretic (water pill) Medicine for heart rhythm problems (including flecainide, propafenone, quinidine) Medicine to treat migraine headaches (including triptans) NSAID pain or arthritis medicine (including aspirin, celecoxib, diclofenac, ibuprofen, naproxen) Other medicine to treat depression or mood disorders (including amitriptyline, desipramine, fluoxetine, imipramine, nortriptyline, paroxetine) Phenothiazine medicine (including thioridazine) Tell your doctor if you use anything else that makes you sleepy. Some examples are allergy medicine, narcotic pain medicine, and alcohol. Do not drink alcohol while you are using this medicine. Warnings While Using This Medicine:  
Tell your doctor if you are pregnant or breastfeeding, or if you have kidney disease, liver disease, diabetes, digestion problems, glaucoma, heart disease, high or low blood pressure, or problems with urination. Tell your doctor if you smoke or you have a history of seizures, or drug or alcohol addiction. This medicine may cause the following problems:  
Serious liver problems Serotonin syndrome (more likely when used with certain other medicines) Increased risk of bleeding problems Serious skin reactions Low sodium levels in the blood This medicine can increase thoughts of suicide. Tell your doctor right away if you start to feel depressed and have thoughts about hurting yourself. This medicine can cause changes in your blood pressure. This may make you dizzy or drowsy. Do not drive or do anything that could be dangerous until you know how this medicine affects you. Stand up slowly to avoid falls. Do not stop using this medicine suddenly. Your doctor will need to slowly decrease your dose before you stop it completely. Your doctor will check your progress and the effects of this medicine at regular visits. Keep all appointments. Keep all medicine out of the reach of children. Never share your medicine with anyone. Possible Side Effects While Using This Medicine:  
Call your doctor right away if you notice any of these side effects: Allergic reaction: Itching or hives, swelling in your face or hands, swelling or tingling in your mouth or throat, chest tightness, trouble breathing Anxiety, restlessness, fever, fast heartbeat, sweating, muscle spasms, diarrhea, seeing or hearing things that are not there Blistering, peeling, red skin rash Confusion, weakness, muscle twitching Dark urine or pale stools, nausea, vomiting, loss of appetite, stomach pain, yellow skin or eyes Decrease in how much or how often you urinate Eye pain, vision changes, seeing halos around lights Feeling more energetic than usual 
Lightheadedness, dizziness, or fainting Unusual moods or behaviors, worsening depression, thoughts about hurting yourself, trouble sleeping Unusual bleeding or bruising If you notice these less serious side effects, talk with your doctor:  
Decrease in appetite or weight Dry mouth, constipation, mild nausea Unusual drowsiness, sleepiness, or tiredness If you notice other side effects that you think are caused by this medicine, tell your doctor. Call your doctor for medical advice about side effects.  You may report side effects to FDA at 4-449-FDA-8006 © 2017 [address]  Information is for End User's use only and may not be sold, redistributed or otherwise used for commercial purposes. The above information is an  only. It is not intended as medical advice for individual conditions or treatments. Talk to your doctor, nurse or pharmacist before following any medical regimen to see if it is safe and effective for you.  
·

## 2018-08-01 NOTE — TELEPHONE ENCOUNTER
Talked with patient and she wants the EMG to be done at [facility], number given to call that office. She would also like a MRI of the Right knee as she feels that she did something to it many months ago when she fell.     Please advise

## 2018-08-01 NOTE — TELEPHONE ENCOUNTER
----- Message from Chelo Rosas sent at 8/1/2018  9:34 AM EDT -----  Regarding: /Telephone  Pt called requesting a call back in regards to changing location from [place] to [facility] for scheduled EMG on Thursday August 23. Pt is also requesting a MRI on right knee pt had a fall on concrete and is not sure if she tore something. Pt best contact number is [phone].

## 2018-08-02 ENCOUNTER — OFFICE VISIT (OUTPATIENT)
Dept: NEUROLOGY | Age: 68
End: 2018-08-02

## 2018-08-02 VITALS — OXYGEN SATURATION: 98 % | HEART RATE: 73 BPM | SYSTOLIC BLOOD PRESSURE: 130 MMHG | DIASTOLIC BLOOD PRESSURE: 63 MMHG

## 2018-08-02 DIAGNOSIS — M48.062 LUMBAR STENOSIS WITH NEUROGENIC CLAUDICATION: Primary | ICD-10-CM

## 2018-08-02 NOTE — LETTER
[facility] at [facility] 
 
 
 
Tel: [phone] ? Fax: [phone] ELECTRODIAGNOSTIC REPORT Test Date:  2018 Patient: Josué Couch : 1950 Physician: Darius Stevens M.D. ID#: [id number] SEX: Female Ref. Phys: Dave Gill M.D. Patient History / Exam: 
Luda Singh 77 y.o. female presents with bilateral lower extremity pain. Query lumbar radiculopathy EMG & NCV Findings: 
Evaluation of the left Fibular motor and the right Fibular motor nerves showed normal distal onset latency (L4.0, R4.0 ms), normal amplitude (L7.6, R8.8 mV), normal conduction velocity (B Fib-Ankle, L50, R51 m/s), and normal conduction velocity (Poplt-B Fib, L56, R45 m/s). The left tibial motor and the right tibial motor nerves showed normal distal onset latency (L4.3, R3.4 ms), normal amplitude (L19.2, R11.8 mV), and normal conduction velocity (Knee-Ankle, L51, R47 m/s). The left Sup Fibular sensory and the right Sup Fibular sensory nerves showed normal distal peak latency (L2.8, R2.9 ms), normal amplitude (L6.0, R6.4 µV), and normal conduction velocity (Lower leg-Lat ankle, L42, R48 m/s). The left sural sensory and the right sural sensory nerves showed normal distal peak latency (L3.7, R3.5 ms) and normal amplitude (L7.7, R8.3 µV). All F Wave latencies were within normal limits. All examined muscles (as indicated in the following table) showed no evidence of electrical instability. Impression: This is a normal study. There is no electrophysiological evidence of a lumbar radiculopathy on either side or a length dependent axonal polyneuropathy. ___________________________ S. Renetta Acevedo M.D. 
 
Nerve Conduction Studies Anti Sensory Summary Table Stim Site NR Peak (ms) Norm Peak (ms) P-T Amp (µV) Norm P-T Amp Site1 Site2 Dist (cm) Left Sup Fibular Anti Sensory (Lat ankle)  29.8°C Lower leg    2.8 <4.6 6.0 >4 Lower leg Lat ankle 10.0 Right Sup Fibular Anti Sensory (Lat ankle)  29.6°C Lower leg    2.9 <4.6 6.4 >4 Lower leg Lat ankle 10.0 Left Sural Anti Sensory (Lat Mall)  30°C Calf    3.7 <4.5 7.7 >4.0 Calf Lat Mall 14.0 Right Sural Anti Sensory (Lat Mall)  30.1°C Calf    3.5 <4.5 8.3 >4.0 Calf Lat Mall 14.0 Motor Summary Table Stim Site NR Onset (ms) Norm Onset (ms) O-P Amp (mV) Norm O-P Amp P-T Amp (mV) Site1 Site2 Dist (cm) Dinesh (m/s) Left Fibular Motor (Ext Dig Brev)  29.8°C Ankle    4.0 <6.5 7.6 >1.1 12.7 Ankle Ext Dig Brev 8.0 B Fib    10.2  6.6  10.3 B Fib Ankle 31.0 50 Poplt    12.0  6.4  10.6 Poplt B Fib 10.0 56 Right Fibular Motor (Ext Dig Brev)  29.5°C Ankle    4.0 <6.5 8.8 >1.1 13.7 Ankle Ext Dig Brev 8.0 B Fib    9.8  7.8  11.9 B Fib Ankle 29.5 51 Poplt    12.0  7.3  10.9 Poplt B Fib 10.0 45 Left Tibial Motor (Abd Rincon Brev)  29.8°C Ankle    4.3 <6.1 19.2 >1.1 32.1 Ankle Abd Rincon Brev 8.0 Knee    12.6  13.1  20.9 Knee Ankle 42.0 51 Right Tibial Motor (Abd Rincon Brev)  29.5°C Ankle    3.4 <6.1 11.8 >1.1 20.6 Ankle Abd Rincon Brev 8.0 Knee    12.1  7.8  13.8 Knee Ankle 41.0 47 F Wave Studies NR F-Lat (ms) Lat Norm (ms) L-R F-Lat (ms) L-R Lat Norm Right Tibial (Mrkrs) (Abd Hallucis)  29.6°C  
   50.01 <56  <5.7 EMG Side Muscle Nerve Root Ins Act Fibs Psw Recrt Duration Amp Poly Comment Right AntTibialis Dp Br Peron L4-5 Nml Nml Nml Nml Nml Nml Nml Right MedGastroc Tibial S1-2 Nml Nml Nml Nml Nml Nml Nml Right VastusLat Femoral L2-4 Nml Nml Nml Nml Nml Nml Nml Right BicepsFemS Sciatic L5-S1 Nml Nml Nml Nml Nml Nml Nml Right Mid Lumb Parasp Rami L4,5 Nml Nml Nml Nml Nml Nml Nml Right TensorFascLat SupGluteal L4-5, S1 Nml Nml Nml Nml Nml Nml Nml Left AntTibialis Dp Br Peron L4-5 Nml Nml Nml Nml Nml Nml Nml Left MedGastroc Tibial S1-2 Nml Nml Nml Nml Nml Nml Nml Left VastusLat Femoral L2-4 Nml Nml Nml Nml Nml Nml Nml Waveforms:

## 2018-08-02 NOTE — PROCEDURES
[facility] at [facility]        Tel: [phone] ? Fax: [phone]    ELECTRODIAGNOSTIC REPORT      Test Date:  2018    Patient: Amado Bledsoe : 1950 Physician: Florinda Guidry M.D.   ID#: [id number] SEX: Female Ref. Phys: Candido Ann M.D. Patient History / Exam:  Светлана Lopez 77 y.o. female presents with bilateral lower extremity pain. Query lumbar radiculopathy    EMG & NCV Findings:  Evaluation of the left Fibular motor and the right Fibular motor nerves showed normal distal onset latency (L4.0, R4.0 ms), normal amplitude (L7.6, R8.8 mV), normal conduction velocity (B Fib-Ankle, L50, R51 m/s), and normal conduction velocity (Poplt-B Fib, L56, R45 m/s). The left tibial motor and the right tibial motor nerves showed normal distal onset latency (L4.3, R3.4 ms), normal amplitude (L19.2, R11.8 mV), and normal conduction velocity (Knee-Ankle, L51, R47 m/s). The left Sup Fibular sensory and the right Sup Fibular sensory nerves showed normal distal peak latency (L2.8, R2.9 ms), normal amplitude (L6.0, R6.4 µV), and normal conduction velocity (Lower leg-Lat ankle, L42, R48 m/s). The left sural sensory and the right sural sensory nerves showed normal distal peak latency (L3.7, R3.5 ms) and normal amplitude (L7.7, R8.3 µV). All F Wave latencies were within normal limits. All examined muscles (as indicated in the following table) showed no evidence of electrical instability. Impression: This is a normal study. There is no electrophysiological evidence of a lumbar radiculopathy on either side or a length dependent axonal polyneuropathy. ___________________________  SIzzy Spann M.D.    Nerve Conduction Studies  Anti Sensory Summary Table     Stim Site NR Peak (ms) Norm Peak (ms) P-T Amp (µV) Norm P-T Amp Site1 Site2 Dist (cm)   Left Sup Fibular Anti Sensory (Lat ankle)  29.8°C   Lower leg    2.8 <4.6 6.0 >4 Lower leg Lat ankle 10.0   Right Sup Fibular Anti Sensory (Lat ankle)  29.6°C   Lower leg    2.9 <4.6 6.4 >4 Lower leg Lat ankle 10.0   Left Sural Anti Sensory (Lat Mall)  30°C   Calf    3.7 <4.5 7.7 >4.0 Calf Lat Mall 14.0   Right Sural Anti Sensory (Lat Mall)  30.1°C   Calf    3.5 <4.5 8.3 >4.0 Calf Lat Mall 14.0     Motor Summary Table     Stim Site NR Onset (ms) Norm Onset (ms) O-P Amp (mV) Norm O-P Amp P-T Amp (mV) Site1 Site2 Dist (cm) Dinesh (m/s)   Left Fibular Motor (Ext Dig Brev)  29.8°C   Ankle    4.0 <6.5 7.6 >1.1 12.7 Ankle Ext Dig Brev 8.0    B Fib    10.2  6.6  10.3 B Fib Ankle 31.0 50   Poplt    12.0  6.4  10.6 Poplt B Fib 10.0 56   Right Fibular Motor (Ext Dig Brev)  29.5°C   Ankle    4.0 <6.5 8.8 >1.1 13.7 Ankle Ext Dig Brev 8.0    B Fib    9.8  7.8  11.9 B Fib Ankle 29.5 51   Poplt    12.0  7.3  10.9 Poplt B Fib 10.0 45   Left Tibial Motor (Abd Rincon Brev)  29.8°C   Ankle    4.3 <6.1 19.2 >1.1 32.1 Ankle Abd Rincon Brev 8.0    Knee    12.6  13.1  20.9 Knee Ankle 42.0 51   Right Tibial Motor (Abd Rincon Brev)  29.5°C   Ankle    3.4 <6.1 11.8 >1.1 20.6 Ankle Abd Rincon Brev 8.0    Knee    12.1  7.8  13.8 Knee Ankle 41.0 47     F Wave Studies     NR F-Lat (ms) Lat Norm (ms) L-R F-Lat (ms) L-R Lat Norm   Right Tibial (Mrkrs) (Abd Hallucis)  29.6°C      50.01 <56  <5.7       EMG     Side Muscle Nerve Root Ins Act Fibs Psw Recrt Duration Amp Poly Comment   Right AntTibialis Dp Br Peron L4-5 Nml Nml Nml Nml Nml Nml Nml    Right MedGastroc Tibial S1-2 Nml Nml Nml Nml Nml Nml Nml    Right VastusLat Femoral L2-4 Nml Nml Nml Nml Nml Nml Nml    Right BicepsFemS Sciatic L5-S1 Nml Nml Nml Nml Nml Nml Nml    Right Mid Lumb Parasp Rami L4,5 Nml Nml Nml Nml Nml Nml Nml    Right TensorFascLat SupGluteal L4-5, S1 Nml Nml Nml Nml Nml Nml Nml    Left AntTibialis Dp Br Peron L4-5 Nml Nml Nml Nml Nml Nml Nml    Left MedGastroc Tibial S1-2 Nml Nml Nml Nml Nml Nml Nml    Left VastusLat Femoral L2-4 Nml Nml Nml Nml Nml Nml Nml      Waveforms:

## 2018-08-06 DIAGNOSIS — M48.062 LUMBAR STENOSIS WITH NEUROGENIC CLAUDICATION: Primary | ICD-10-CM

## 2018-08-06 NOTE — TELEPHONE ENCOUNTER
Order placed for MRI of Lower Back due to back pain greater than 6 weeks. per Verbal Order from Dr. Jamison Gale on 8/6/2018 due to lower back pain.

## 2018-08-15 ENCOUNTER — TELEPHONE (OUTPATIENT)
Dept: NEUROLOGY | Age: 68
End: 2018-08-15

## 2018-08-15 NOTE — TELEPHONE ENCOUNTER
----- Message from Jose L Wagner sent at 8/14/2018  4:14 PM EDT -----  Regarding: Dr. Maia Regan  Pt requested \"Naltrexone\" Rx be reduced from 2.5 to 1.5 if poss, due to side effects of night terrors and dizziness. Pt uses the [facility] on file. Pt best contact [phone].

## 2018-08-17 NOTE — TELEPHONE ENCOUNTER
Order placed for Naltrexone 1.5 mg take 1 tablet daily, 90 day supply x 1 refill, PO, per Verbal Order from Dr. Addison Robert on 8/17/2018 due to pain.

## 2018-08-20 ENCOUNTER — TELEPHONE (OUTPATIENT)
Dept: NEUROLOGY | Age: 68
End: 2018-08-20

## 2018-08-20 NOTE — TELEPHONE ENCOUNTER
----- Message from Dave Mckeon sent at 8/20/2018 10:31 AM EDT -----  Regarding: Dr Gordillo/rx refill  Pt (p) [phone], pt is following up on an rx refill,  pt would like to know if her LVN (Altrenone  medication has  Been  called into her [address] [place] [address] Road [phone]. Pt gave permission to leave a voice mail message, if she misses the nurses call.

## 2018-08-20 NOTE — TELEPHONE ENCOUNTER
Called and spoke with patient and let her know that I called the pharmacy on Friday and gave a verbal order per Dr. Ly Purcell for the Naltrexone.  1.5 mg tablets

## 2018-08-22 ENCOUNTER — HOSPITAL ENCOUNTER (OUTPATIENT)
Dept: MRI IMAGING | Age: 68
Discharge: HOME | End: 2018-08-22
Attending: PSYCHIATRY & NEUROLOGY
Payer: MEDICARE

## 2018-08-22 DIAGNOSIS — M48.062 LUMBAR STENOSIS WITH NEUROGENIC CLAUDICATION: ICD-10-CM

## 2018-08-22 PROCEDURE — 72148 MRI LUMBAR SPINE W/O DYE: CPT

## 2018-09-17 RX ORDER — INSULIN PUMP SYRINGE, 3 ML
EACH MISCELLANEOUS
Qty: 1 KIT | Refills: 0 | Status: SHIPPED | OUTPATIENT
Start: 2018-09-17 | End: 2019-03-25

## 2018-11-20 ENCOUNTER — TELEPHONE (OUTPATIENT)
Dept: NEUROLOGY | Age: 68
End: 2018-11-20

## 2018-11-20 NOTE — TELEPHONE ENCOUNTER
----- Message from Jose L Wagner sent at 11/20/2018  2:01 PM EST -----  Regarding: Dr Gordillo/ refill  The pt called to request a refill on her \"c-naltrexone 1.5mg\" medication, but would like the dosage increase to 2 or 2.5mg. The Rx should be sent to [facility] pharmacy [phone].       Best contact number is [phone]

## 2018-11-21 NOTE — TELEPHONE ENCOUNTER
Called Pharmacy and gave a verbal order for Naltrexone 2 mg take one tablet by mouth at bedtime. 90 day supply with 1 refill. Order placed for Naltrexone 2 mg , # 90 day supply 1 refill, PO, per Verbal Order from Dr. Celina Fisher on 11/21/2018 due to pain.

## 2018-11-21 NOTE — TELEPHONE ENCOUNTER
Called and let patient know that pharmacy was given the verbal order for the Naltrexone and it should be ready today.

## 2019-01-11 ENCOUNTER — OFFICE VISIT (OUTPATIENT)
Dept: NEUROLOGY | Age: 69
End: 2019-01-11

## 2019-01-11 VITALS
SYSTOLIC BLOOD PRESSURE: 122 MMHG | DIASTOLIC BLOOD PRESSURE: 68 MMHG | OXYGEN SATURATION: 96 % | HEART RATE: 85 BPM | BODY MASS INDEX: 40.82 KG/M2 | HEIGHT: 66 IN | WEIGHT: 254 LBS | RESPIRATION RATE: 16 BRPM

## 2019-01-11 DIAGNOSIS — R41.3 MEMORY LOSS: ICD-10-CM

## 2019-01-11 DIAGNOSIS — R51.9 WORSENING HEADACHES: Primary | ICD-10-CM

## 2019-01-11 RX ORDER — ASPIRIN 81 MG/1
TABLET ORAL DAILY
COMMUNITY
End: 2020-07-27

## 2019-01-11 NOTE — LETTER
Name:  Newt Shone 
:  1950 MRN:  [id number] PCP:  Court Escoto MD 
 
Chief Complaint Patient presents with  
 Headache  
  states that fluid in the frontal lobe  Dizziness  
  balance/ falling  Other  
  studdering and stumbling  Memory Loss  
  luzmaria. comprehension HISTORY OF PRESENT ILLNESS: 
Robert Hay is a 77 y.o., female who presents today for follow up for headaches, dizziness and numbness and tingling in both hands. She did have an EMG/NCS of the BLE that was normal. 
She has been using CBC oil since October. She gets it from Dr. Flores Setting office and reports this has really helped with walking and sleeping. She still had weakness but no severe pains. In December she started have more headaches and less effect of the CBD oil. She takes it every 8 hours. This also helps with stiffness. She does have some congestion and tried sinus meds without much benefit. She is trying neosporin for her nose prior to going out. She is having issues with her memory. She can't retain instructions, etc. She likes to do geneology but isn't able to do this as well. At our last visit we tried to start her on cymbalta. We also tried her on low dose naltrexone. She is on 2 tabs of this now. Headaches are still persistant. It is around there forehead and top of the head. She did have some biofeedback. She was told she has fluid in her frontal lobe. She gets this released with electrodes. She has a graph today about her biofeedback session. She is concerned about her mold exposure. She has a graph that says if you have more than 8 you have mold and the patient has 26. She has been told she has chronic inflammatory response. She does have EBV and has had a flare. She reports she can't drive well. She is having vision issues and memory issues. Her words will be stuttering or drawn out. She has not had recent imaging or neuropsych testing. She is a retired  and feels she did suffer from lack of oxygen. Recap: We did refer her to Dr. Magdalena Ray for possible surgery. He didn't think she needed it at this point. She is having a flare of Shivam Barr Virus. Her level was over 600. She has been doing vitamin supplements for this. She is also been diagnosed with fibromyaglia. She has continued pain in her low back. She has pain down her right leg. She has to turn her right leg to the side. She has been trying creams and aspirin. She has not been able to walk or stand well. She can't cook or clean. She has started back with PT. Last Thursday and Friday she had a TENS unit type of device on her right leg and this helped her pain significantly. She also sees Dr. Jose Luis Escamilla for her elevated blood sugars/metabolic syndrome and he is adjusting medications. She does have a history of breast cancer She did try gabapentin but she thinks it made her feel funny the next day but didn't help the pain. She only tried it for 3 days. She says she had really bad pain with the Lyrica in the past. 
 
 
Imaging: MRI lumbar spine: Focal areas of disc disease with disc extrusion at L4/5 (I personally reviewed these images in provided CD and this is my impression) Current Outpatient Medications Medication Sig  Blood-Glucose Meter (ONETOUCH VERIO FLEX START) monitoring kit Test blood sugars twice per day. DX:R73.9  
 glucose blood VI test strips (ONETOUCH VERIO) strip Check blood sugar twice per day. DX: R73.9 (new dx code)  OTHER Take 70 mg by mouth daily. Indications: Progestrone  DULoxetine (CYMBALTA) 20 mg capsule Take 1 Cap by mouth daily.  TRULICITY 8.94 EN/6.7 mL sub-q pen 0.5 mL by SubCUTAneous route every seven (7) days.  thyroid, Pork, (NATURE-THROID/WESTHROID) 32.5 mg tablet Take  by mouth daily. Takes 81.2 mg/day  cholecalciferol, VITAMIN D3, (VITAMIN D3) 5,000 unit tab tablet Take 1 tablet every day by oral route in the morning.  thyroid, pork, (NATURE-THROID) 81.25 mg tab Take 1 tablet every day by oral route in the morning.  MELATONIN-LEMON BALM LEAF EXTR PO Take  by mouth. 2 at bedtime  OTHER,NON-FORMULARY, MonoPure 1300 EC(Omega XYMogen), ActivNutrients no iron 2/day (MVI/Mineral formula; Natural D-Hist 2/day CandiBactin-AR, Vitamin D3+K2-1 drop per day (500 iu); Minerals 1 drop a day;SACO-B, XLEAR 1 sprayper day; Formula 3 (toes)  metFORMIN ER (GLUCOPHAGE XR) 500 mg tablet Take 1 Tab by mouth two (2) times a day for 360 days. One pill with breakfast and one pill with dinner  VENTOLIN HFA 90 mcg/actuation inhaler Take 1 Puff by inhalation every four (4) hours as needed for Wheezing.  cetirizine (ZYRTEC) 10 mg tablet Take 0.5-1 Tabs by mouth daily.  TIROSINT 50 mcg cap daily.  LACTOBACILLUS ACIDOPHILUS (PROBIOTIC PO) Take  by mouth.  MAGNESIUM GLYCINATE, BULK, by Does Not Apply route. No current facility-administered medications for this visit. Allergies Allergen Reactions  Adhes. Band-Tape-Benzalkonium Swelling  Adhesive Swelling  Dairy Aid [Lactase] Unknown (comments)  Gluten Diarrhea \"brain fog\", aching  Iodinated Contrast- Oral And Iv Dye Other (comments) Gets dizzy and shakey  Milk Containing Products Other (comments) Aches and stomach cramps  Motrin [Ibuprofen] Unknown (comments)  Other Medication Other (comments) Super sensitive to all medication  Soy Unknown (comments)  Sulfa (Sulfonamide Antibiotics) Unable to Obtain  Trulicity [Dulaglutide] Nausea Only Stomach discomfort  Wheat Unknown (comments) Past Medical History:  
Diagnosis Date  Chest pain, unspecified  Chronic fatigue syndrome 3/16/2012  Fibromyalgia  Impaired glucose tolerance  Other dyspnea and respiratory abnormality  Palpitations  Shortness of breath  Skin cancer 3/16/2012  Unspecified hypothyroidism   
 goiter  Unspecified vitamin D deficiency Past Surgical History:  
Procedure Laterality Date  HX BREAST LUMPECTOMY  7/2013  
 right breast  
 HX TONSILLECTOMY  HX TONSILLECTOMY  HX UROLOGICAL    
 urethral opening was widened Social History Socioeconomic History  Marital status:  Spouse name: Not on file  Number of children: Not on file  Years of education: Not on file  Highest education level: Not on file Social Needs  Financial resource strain: Not on file  Food insecurity - worry: Not on file  Food insecurity - inability: Not on file  Transportation needs - medical: Not on file  Transportation needs - non-medical: Not on file Occupational History  Not on file Tobacco Use  Smoking status: Never Smoker  Smokeless tobacco: Never Used Substance and Sexual Activity  Alcohol use: No  
  Alcohol/week: 0.0 oz  
  Comment: Rare  Drug use: No  
 Sexual activity: Yes  
  Partners: Male Other Topics Concern  Not on file Social History Narrative Lives in [facility] with . Used to work as a  for [employer]. Family History Problem Relation Age of Onset  Coronary Artery Disease Mother  Thyroid Disease Mother  Diabetes Mother  Cancer Mother Multiple Myeloma  Cancer Father Colon cancer  Arthritis-osteo Father  Heart Disease Father  High Cholesterol Brother  Thyroid Disease Brother   
     thyroid cancer  Cancer Brother Thyroid and Skin  Diabetes Maternal Aunt  Hypertension Other  Heart Disease Other  Depression Other  Anxiety Other  Cancer Sister Skin  Cancer Brother Multiple Myeloma and skin  Cancer Brother Skin Labs: EBV AB IGM- <36 EBV IGG AB >600 PHYSICAL EXAMINATION:   
Visit Vitals /68 Pulse 85 Resp 16 Ht 5' 5.5\" (1.664 m) Wt 115.2 kg (254 lb) SpO2 96% BMI 41.62 kg/m² General:  Well defined, nourished, and groomed individual in no acute distress. Neck: Supple, nontender, no bruits, no pain with resistance to active range of motion. Heart: Regular rate and rhythm, no murmurs, rub, or gallop. Normal S1S2. Lungs:  Clear to auscultation bilaterally with equal chest expansion, no cough, no wheeze Musculoskeletal:  Extremities revealed no edema and had full range of motion of joints. Psych: Normal 
 
NEUROLOGICAL EXAMINATION:    
Mental Status:   Alert and oriented to person, place, and time with recent and remote memory intact. Patient is somewhat tangential in her speech today. Cranial Nerves:   
II, III, IV, VI:  Visual acuity grossly intact. Pupils are equal, round, and reactive to light. Extra-ocular movements are full and fluid. No ptosis or nystagmus. V-XII: Hearing is grossly intact. Facial features are symmetric, with normal sensation and strength. The palate rises symmetrically and the tongue protrudes midline. Sternocleidomastoids 5/5. Motor Examination: Normal tone, bulk, and strength, 5/5 in UE except 4/5 bilateral IHM, 4/5 in BLE Coordination:  Finger to nose testing was normal.   No resting or intention tremor Gait and Station:  Wide based ASSESSMENT AND PLAN 
 
70-year-old female seen in follow-up headaches, balance issues and today complaints of memory issues. Her main issue today is her memory and headaches. She would like to do a workup prior to taking any new medications. She has been started on low-dose naltrexone. Previously she has tried gabapentin, Cymbalta, and Lyrica with side effects. We will hold off on additional treatment of headaches until further testing is completed. 1.  Patient is seen Dr. Rhoda Nolan with neurosurgery but he wants to wait on any surgical options at this time 2. EMG/NCS was negative 3.  Continue naltrexone 5 mg tablet that she is already gotten from rheumatology. 4.  Referral for neuropsych testing 5. Will do MRI brain 6. May need treatment for headache at follow-up Follow-up after testing Stephan Iyer MD 
 
Over 40 minutes was spent with the patient of which 30 minutes of the visit was spent counseling on diagnosis, management, and treatment of the diagnosis of lumbar stenosis. We also discussed combinations of medications and when it may be appropriate to do additional imaging. Medications and side effects discussed with patient in detail. With any new medications prescribed, patient was given instructions on administration and side effects. Written medication information was provided to the patient as well. This note was created using voice recognition software. Despite editing, there may be syntax errors. This note will not be viewable in [address]

## 2019-01-11 NOTE — PROGRESS NOTES
Name:  Tayler Dudley  :  1950  MRN:  [id number]     PCP:  Martha Bailey MD    Chief Complaint   Patient presents with    Headache     states that fluid in the frontal lobe    Dizziness     balance/ falling    Other     studdering and stumbling     Memory Loss     [email or web address]. comprehension          HISTORY OF PRESENT ILLNESS:  Tamela Lopez is a 77 y.o., female who presents today for follow up for headaches, dizziness and numbness and tingling in both hands. She did have an EMG/NCS of the BLE that was normal.  She has been using CBC oil since October. She gets it from Dr[facility] and reports this has really helped with walking and sleeping. She still had weakness but no severe pains. In December she started have more headaches and less effect of the CBD oil. She takes it every 8 hours. This also helps with stiffness. She does have some congestion and tried sinus meds without much benefit. She is trying neosporin for her nose prior to going out. She is having issues with her memory. She can't retain instructions, etc. She likes to do geneology but isn't able to do this as well. At our last visit we tried to start her on cymbalta. We also tried her on low dose naltrexone. She is on 2 tabs of this now. Headaches are still persistant. It is around there forehead and top of the head. She did have some biofeedback. She was told she has fluid in her frontal lobe. She gets this released with electrodes. She has a graph today about her biofeedback session. She is concerned about her mold exposure. She has a graph that says if you have more than 8 you have mold and the patient has 26. She has been told she has chronic inflammatory response. She does have EBV and has had a flare. She reports she can't drive well. She is having vision issues and memory issues. Her words will be stuttering or drawn out. She has not had recent imaging or neuropsych testing.     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 Recap: We did refer her to Dr. Devan Small for possible surgery. He didn't think she needed it at this point. She is having a flare of Shivam Barr Virus. Her level was over 600. She has been doing vitamin supplements for this. She is also been diagnosed with fibromyaglia. She has continued pain in her low back. She has pain down her right leg. She has to turn her right leg to the side. She has been trying creams and aspirin. She has not been able to walk or stand well. She can't cook or clean. She has started back with PT. Last Thursday and Friday she had a TENS unit type of device on her right leg and this helped her pain significantly. She also sees Dr. Neda Orta for her elevated blood sugars/metabolic syndrome and he is adjusting medications. She does have a history of breast cancer  She did try gabapentin but she thinks it made her feel funny the next day but didn't help the pain. She only tried it for 3 days. She says she had really bad pain with the Lyrica in the past.      Imaging:  MRI lumbar spine: Focal areas of disc disease with disc extrusion at L4/5 (I personally reviewed these images in provided CD and this is my impression)       Current Outpatient Medications   Medication Sig    Blood-Glucose Meter (ONETOUCH VERIO FLEX START) monitoring kit Test blood sugars twice per day. DX:R73.9    glucose blood VI test strips (ONETOUCH VERIO) strip Check blood sugar twice per day. DX: R73.9 (new dx code)    OTHER Take 70 mg by mouth daily. Indications: Progestrone    DULoxetine (CYMBALTA) 20 mg capsule Take 1 Cap by mouth daily.  TRULICITY 9.88 XO/3.0 mL sub-q pen 0.5 mL by SubCUTAneous route every seven (7) days.  thyroid, Pork, (NATURE-THROID/WESTHROID) 32.5 mg tablet Take  by mouth daily. Takes 81.2 mg/day    cholecalciferol, VITAMIN D3, (VITAMIN D3) 5,000 unit tab tablet Take 1 tablet every day by oral route in the morning.     thyroid, pork, (NATURE-THROID) 81.25 mg tab Take 1 tablet every day by oral route in the morning.  MELATONIN-LEMON BALM LEAF EXTR PO Take  by mouth. 2 at bedtime    OTHER,NON-FORMULARY, MonoPure 1300 EC(Omega XYMogen), ActivNutrients no iron 2/day (MVI/Mineral formula; Natural D-Hist 2/day  CandiBactin-AR, Vitamin D3+K2-1 drop per day (500 iu); Minerals 1 drop a day;SACO-B, XLEAR 1 sprayper day; Formula 3 (toes)    metFORMIN ER (GLUCOPHAGE XR) 500 mg tablet Take 1 Tab by mouth two (2) times a day for 360 days. One pill with breakfast and one pill with dinner    VENTOLIN HFA 90 mcg/actuation inhaler Take 1 Puff by inhalation every four (4) hours as needed for Wheezing.  cetirizine (ZYRTEC) 10 mg tablet Take 0.5-1 Tabs by mouth daily.  TIROSINT 50 mcg cap daily.  LACTOBACILLUS ACIDOPHILUS (PROBIOTIC PO) Take  by mouth.  MAGNESIUM GLYCINATE, BULK, by Does Not Apply route. No current facility-administered medications for this visit. Allergies   Allergen Reactions    Adhes.  Band-Tape-Benzalkonium Swelling    Adhesive Swelling    Dairy Aid [Lactase] Unknown (comments)    Gluten Diarrhea     \"brain fog\", aching    Iodinated Contrast- Oral And Iv Dye Other (comments)     Gets dizzy and shakey    Milk Containing Products Other (comments)     Aches and stomach cramps    Motrin [Ibuprofen] Unknown (comments)    Other Medication Other (comments)     Super sensitive to all medication    Soy Unknown (comments)    Sulfa (Sulfonamide Antibiotics) Unable to Obtain    Trulicity [Dulaglutide] Nausea Only     Stomach discomfort    Wheat Unknown (comments)     Past Medical History:   Diagnosis Date    Chest pain, unspecified     Chronic fatigue syndrome 3/16/2012    Fibromyalgia     Impaired glucose tolerance     Other dyspnea and respiratory abnormality     Palpitations     Shortness of breath     Skin cancer 3/16/2012    Unspecified hypothyroidism     goiter    Unspecified vitamin D deficiency      Past Surgical History:   Procedure Laterality Date    HX BREAST LUMPECTOMY  7/2013    right breast    HX TONSILLECTOMY      HX TONSILLECTOMY      HX UROLOGICAL      urethral opening was widened     Social History     Socioeconomic History    Marital status:      Spouse name: Not on file    Number of children: Not on file    Years of education: Not on file    Highest education level: Not on file   Social Needs    Financial resource strain: Not on file    Food insecurity - worry: Not on file    Food insecurity - inability: Not on file   [employer] needs - medical: Not on file   [employer] needs - non-medical: Not on file   Occupational History    Not on file   Tobacco Use    Smoking status: Never Smoker    Smokeless tobacco: Never Used   Substance and Sexual Activity    Alcohol use: No     Alcohol/week: 0.0 oz     Comment: Rare    Drug use: No    Sexual activity: Yes     Partners: Male   Other Topics Concern    Not on file   Social History Narrative    Lives in [facility] with . Used to work as a  for [employer].      Family History   Problem Relation Age of Onset    Coronary Artery Disease Mother     Thyroid Disease Mother     Diabetes Mother     Cancer Mother         Multiple Myeloma    Cancer Father         Colon cancer    Arthritis-osteo Father     Heart Disease Father     High Cholesterol Brother     Thyroid Disease Brother         thyroid cancer    Cancer Brother         Thyroid and Skin    Diabetes Maternal Aunt     Hypertension Other     Heart Disease Other     Depression Other     Anxiety Other     Cancer Sister         Skin    Cancer Brother         Multiple Myeloma and skin    Cancer Brother         Skin     Labs:  EBV AB IGM- <36  EBV IGG AB >600    PHYSICAL EXAMINATION:    Visit Vitals  /68   Pulse 85   Resp 16   Ht 5' 5.5\" (1.664 m)   Wt 115.2 kg (254 lb)   SpO2 96%   BMI 41.62 kg/m²     General:  Well defined, nourished, and groomed individual in no acute distress. Neck: Supple, nontender, no bruits, no pain with resistance to active range of motion. Heart: Regular rate and rhythm, no murmurs, rub, or gallop. Normal S1S2. Lungs:  Clear to auscultation bilaterally with equal chest expansion, no cough, no wheeze  Musculoskeletal:  Extremities revealed no edema and had full range of motion of joints. Psych: Normal    NEUROLOGICAL EXAMINATION:     Mental Status:   Alert and oriented to person, place, and time with recent and remote memory intact. Patient is somewhat tangential in her speech today. Cranial Nerves:    II, III, IV, VI:  Visual acuity grossly intact. Pupils are equal, round, and reactive to light. Extra-ocular movements are full and fluid. No ptosis or nystagmus. V-XII: Hearing is grossly intact. Facial features are symmetric, with normal sensation and strength. The palate rises symmetrically and the tongue protrudes midline. Sternocleidomastoids 5/5. Motor Examination: Normal tone, bulk, and strength, 5/5 in UE except 4/5 bilateral IHM, 4/5 in BLE  Coordination:  Finger to nose testing was normal.   No resting or intention tremor  Gait and Station:  Wide based      ASSESSMENT AND PLAN    23-year-old female seen in follow-up headaches, balance issues and today complaints of memory issues. Her main issue today is her memory and headaches. She would like to do a workup prior to taking any new medications. She has been started on low-dose naltrexone. Previously she has tried gabapentin, Cymbalta, and Lyrica with side effects. We will hold off on additional treatment of headaches until further testing is completed. 1.  Patient is seen Dr. Bri Becker with neurosurgery but he wants to wait on any surgical options at this time  2. EMG/NCS was negative  3. Continue naltrexone 5 mg tablet that she is already gotten from rheumatology. 4.  Referral for neuropsych testing  5. Will do MRI brain   6. May need treatment for headache at follow-up    Follow-up after testing    Marcelina Peraza MD    Over 40 minutes was spent with the patient of which 30 minutes of the visit was spent counseling on diagnosis, management, and treatment of the diagnosis of lumbar stenosis. We also discussed combinations of medications and when it may be appropriate to do additional imaging. Medications and side effects discussed with patient in detail. With any new medications prescribed, patient was given instructions on administration and side effects. Written medication information was provided to the patient as well. This note was created using voice recognition software. Despite editing, there may be syntax errors. This note will not be viewable in [address]

## 2019-02-27 ENCOUNTER — HOSPITAL ENCOUNTER (OUTPATIENT)
Dept: MRI IMAGING | Age: 69
Discharge: HOME | End: 2019-02-27
Attending: PSYCHIATRY & NEUROLOGY
Payer: MEDICARE

## 2019-02-27 DIAGNOSIS — R51.9 WORSENING HEADACHES: ICD-10-CM

## 2019-02-27 DIAGNOSIS — R41.3 MEMORY LOSS: ICD-10-CM

## 2019-02-27 PROCEDURE — 70551 MRI BRAIN STEM W/O DYE: CPT

## 2019-03-05 ENCOUNTER — TELEPHONE (OUTPATIENT)
Dept: NEUROLOGY | Age: 69
End: 2019-03-05

## 2019-03-05 NOTE — TELEPHONE ENCOUNTER
----- Message from Kurtis Philip sent at 3/5/2019 10:16 AM EST -----  Regarding: Dr Gordillo/telephone  Pt is calling to get MRI  Test results and to make a appt with the doctor, please call the pt at [phone].

## 2019-03-06 ENCOUNTER — TELEPHONE (OUTPATIENT)
Dept: NEUROLOGY | Age: 69
End: 2019-03-06

## 2019-03-06 DIAGNOSIS — M50.30 DDD (DEGENERATIVE DISC DISEASE), CERVICAL: Primary | ICD-10-CM

## 2019-03-06 DIAGNOSIS — M51.36 DDD (DEGENERATIVE DISC DISEASE), LUMBAR: ICD-10-CM

## 2019-03-06 DIAGNOSIS — R51.9 WORSENING HEADACHES: ICD-10-CM

## 2019-03-06 NOTE — TELEPHONE ENCOUNTER
----- Message from Palomo Landa sent at 3/6/2019  9:14 AM EST -----  Regarding: Dr. Steven Mcconnell: [phone]  Pt missed call about MRI results and would like a call back.

## 2019-03-07 NOTE — TELEPHONE ENCOUNTER
Called and spoke with patient and instructed per Dr. Les Mccullough that MRI was normal. I informed her that I have no appt. With Dr. Les Mccullough as she is leaving the office in two weeks. The patient wanted me to ask if the headaches that  She has been having could be due to her neck issues as she has degenerative disk disease and wondered if an x-ray of the neck should be ordered. I told that Dr. Les Mccullough would be out of the office until next week and I would let her know what the Dr. Talavera Pool. I also explained that she should be looking to schedule with a different MD and since she is closer to [facility] she has decided on that location. The number was given to the office for the patient to set up an appt.     Please advise

## 2019-03-13 NOTE — TELEPHONE ENCOUNTER
Called and spoke with patient and she asked for us to send the referral slip to her and she would take it to the place where she wants to go. Address confirmed with patient. Order placed for Referral PT, Referral, per Verbal Order from Dr. Eren Schwartz on 3/13/2019 due to headache.

## 2019-03-25 ENCOUNTER — OFFICE VISIT (OUTPATIENT)
Dept: ENDOCRINOLOGY | Age: 69
End: 2019-03-25

## 2019-03-25 VITALS
BODY MASS INDEX: 40.63 KG/M2 | DIASTOLIC BLOOD PRESSURE: 45 MMHG | HEART RATE: 69 BPM | HEIGHT: 66 IN | SYSTOLIC BLOOD PRESSURE: 122 MMHG | WEIGHT: 252.8 LBS

## 2019-03-25 DIAGNOSIS — R73.02 IMPAIRED GLUCOSE TOLERANCE: Primary | ICD-10-CM

## 2019-03-25 DIAGNOSIS — E66.01 CLASS 3 SEVERE OBESITY DUE TO EXCESS CALORIES WITH SERIOUS COMORBIDITY AND BODY MASS INDEX (BMI) OF 40.0 TO 44.9 IN ADULT (HCC): ICD-10-CM

## 2019-03-25 RX ORDER — LANCETS 33 GAUGE
EACH MISCELLANEOUS
Qty: 100 LANCET | Refills: 3 | Status: SHIPPED | OUTPATIENT
Start: 2019-03-25 | End: 2022-03-17 | Stop reason: ALTCHOICE

## 2019-03-25 RX ORDER — INSULIN PUMP SYRINGE, 3 ML
EACH MISCELLANEOUS
Qty: 1 KIT | Refills: 0 | Status: SHIPPED | OUTPATIENT
Start: 2019-03-25 | End: 2020-07-27

## 2019-03-25 RX ORDER — METFORMIN HYDROCHLORIDE 500 MG/1
TABLET, EXTENDED RELEASE ORAL
Qty: 60 TAB | Refills: 11 | Status: SHIPPED | OUTPATIENT
Start: 2019-03-25 | End: 2020-06-17

## 2019-03-25 NOTE — PROGRESS NOTES
Chief Complaint   Patient presents with    Blood sugar problem     pcp and pharmacy verified       History of Present Illness: Kaia Parr is a 77 y.o. female here for follow up of obesity and Impaired Glucose tolerance. Per the notes from Dr. Liz Garrett in 2012 she has hx of hypothyroidism diagnosed in 1992, when she had fatigue and a goiter. She has seen several endocrinologists and her treatment regimen has changes multiple times over the years. She notes that in October of 2014 she began to have issues of vertigo and migraines. She notes she has been evaluated by Neurology and ENT here and at [facility]. She has had extensive work up and she reports that it was felt her thyroid issues were contributing to her symptoms. In April of 2014 she had a FT4 of 1.25 with TT4 of 0.9 and TSH of 0.16 by a physician at [facility]. At that time she was taking [place] throid 162mg daily. At that time she was changes to 97.25 mg on T,T,Sa,Grimm and 130mg on M,W,F. She has not had any repeat thyroid function tests since that time. She notes that she was started on the Nature Throid at the end of 2012 when she felt that the synthroid/cytomel combination \"was not helping me feel better\". She notes she was started on the [employer] by Arun Aldana holistic/natural doctor\" but she cannot recall their name or exactly when. After our last visit in June 2018 I sent her for Gregorio Stim Test to rule out  \"adrenal fatigue\". Her adrenal glands stimulated well. She was having nausea from the Trulicity so I stopped and started her on Metformin XR 500mg BID. Pt did not want to take it so she has not been taking the Metformin. Her weight today was 252 pounds, which is down 3 pounds form our last visit. Her PCP ordered an MRI because she was having HAs, the MRI did not show any abnormalities. On 2/12/19 her TSH was 0.476 with TT4 of 6.9 and TT3 146. Her A1C was 6.0%.     She is currently seeing Dr. Kody Sal of Neurology headaches, dizziness and numbness and Dr. Manoj Kinsey of Sleep medicine for BRAVO. She is followed by Rheumatology for RLS and Fibromyalgia. Pt wakes around 8AM  She has not been eating breakfast consistently, but she is trying to eat an hour after waking. This AM she had a hamburger and fries, tea and a sandwich. She notes that she is hungry all the time and wants to eat all the time. She brought her BG logs with her, when she does check her BGs it runs in the 's range. She notes that since October 2018 she has started CBD oil and her knee pain is better and she is able to sleep through the night more consistently. Her PCP is treating her thyroid, HLD and Hypovitaminosis D. Past Medical History:   Diagnosis Date    Chest pain, unspecified     Chronic fatigue syndrome 3/16/2012    Fibromyalgia     Impaired glucose tolerance     Other dyspnea and respiratory abnormality     Palpitations     Shortness of breath     Skin cancer 3/16/2012    Unspecified hypothyroidism     goiter    Unspecified vitamin D deficiency      Past Surgical History:   Procedure Laterality Date    HX BREAST LUMPECTOMY  7/2013    right breast    HX TONSILLECTOMY      HX TONSILLECTOMY      HX UROLOGICAL      urethral opening was widened     Current Outpatient Medications   Medication Sig    HYDROXOCOBALAMIN IM 20 mg by IntraMUSCular route daily.  metFORMIN ER (GLUCOPHAGE XR) 500 mg tablet One pill in the morning and one pill with dinner.  aspirin delayed-release 81 mg tablet Take  by mouth daily.  OTHER Take 70 mg by mouth daily. Indications: Progestrone    thyroid, Pork, (NATURE-THROID/WESTHROID) 32.5 mg tablet Take  by mouth daily. Takes 81.2 mg/day    cholecalciferol, VITAMIN D3, (VITAMIN D3) 5,000 unit tab tablet 32355 iu (in drop form), K2    thyroid, pork, (NATURE-THROID) 81.25 mg tab     TIROSINT 50 mcg cap daily.  LACTOBACILLUS ACIDOPHILUS (PROBIOTIC PO) Take  by mouth.     MAGNESIUM GLYCINATE, BULK, by Does Not Apply route. No current facility-administered medications for this visit. Allergies   Allergen Reactions    Adhes. Band-Tape-Benzalkonium Swelling    Adhesive Swelling    Dairy Aid [Lactase] Unknown (comments)    Gluten Diarrhea     \"brain fog\", aching    Iodinated Contrast- Oral And Iv Dye Other (comments)     Gets dizzy and shakey    Milk Containing Products Other (comments)     Aches and stomach cramps    Motrin [Ibuprofen] Unknown (comments)    Other Medication Other (comments)     Super sensitive to all medication    Soy Unknown (comments)    Sulfa (Sulfonamide Antibiotics) Unable to Obtain    Trulicity [Dulaglutide] Nausea Only     Stomach discomfort    Wheat Unknown (comments)     Family History   Problem Relation Age of Onset    Coronary Artery Disease Mother     Thyroid Disease Mother     Diabetes Mother     Cancer Mother         Multiple Myeloma    Cancer Father         Colon cancer    Arthritis-osteo Father     Heart Disease Father     High Cholesterol Brother     Thyroid Disease Brother         thyroid cancer    Cancer Brother         Thyroid and Skin    Diabetes Maternal Aunt     Hypertension Other     Heart Disease Other     Depression Other     Anxiety Other     Cancer Sister         Skin    Cancer Brother         Multiple Myeloma and skin    Cancer Brother         Skin     Social History     Socioeconomic History    Marital status:      Spouse name: Not on file    Number of children: Not on file    Years of education: Not on file    Highest education level: Not on file   Occupational History    Not on file   Social Needs    Financial resource strain: Not on file    Food insecurity:     Worry: Not on file     Inability: Not on file    Transportation needs:     Medical: Not on file     Non-medical: Not on file   Tobacco Use    Smoking status: Never Smoker    Smokeless tobacco: Never Used   Substance and Sexual Activity    Alcohol use:  No Alcohol/week: 0.0 oz     Comment: Rare    Drug use: No    Sexual activity: Yes     Partners: Male   Lifestyle    Physical activity:     Days per week: Not on file     Minutes per session: Not on file    Stress: Not on file   Relationships    Social connections:     Talks on phone: Not on file     Gets together: Not on file     Attends Alevism service: Not on file     Active member of club or organization: Not on file     Attends meetings of clubs or organizations: Not on file     Relationship status: Not on file    Intimate partner violence:     Fear of current or ex partner: Not on file     Emotionally abused: Not on file     Physically abused: Not on file     Forced sexual activity: Not on file   Other Topics Concern    Not on file   Social History Narrative    Lives in [facility] [place] with . Used to work as a  for [employer]. Review of Systems:  - Negative except as noted in HPI    Physical Examination:  Blood pressure 122/45, pulse 69, height 5' 5.5\" (1.664 m), weight 252 lb 12.8 oz (114.7 kg). - General: pleasant, no distress, good eye contact  - HEENT: no exopthalmos, no periorbital edema, no scleral/conjunctival injection, EOMI, no lid lag or stare  - Neck: supple, no thyromegaly, masses, lymph nodes, or carotid bruits, no supraclavicular or dorsocervical fat pads  - Cardiovascular: regular, normal rate, normal S1 and S2, no murmurs/rubs/gallops   - Respiratory: clear to auscultation bilaterally  - Gastrointestinal: soft, nontender, nondistended, no masses, no hepatosplenomegaly  - Musculoskeletal: no proximal muscle weakness in upper or lower extremities  - Integumentary: no acanthosis nigricans, no abdominal striae, no rashes, no edema  - Neurological: reflexes 2+ at biceps, no tremor  - Psychiatric: normal mood and affect    Data Reviewed:   Labs reviewed (see HPI)    Assessment/Plan:     1) Impaired Glucose tolerance > Pt to restart the Metformin XR 500mg BID.  Pt to check her BGs daily and mail her BG logs to me in 6 weeks. If her BGs improve we can continue this dose and if needed we can increase to 1000mg BID. 2) Obesity > We discussed trying pt on phentermine to help with her over-eating, but we agreed to not start two medications at the same time. If she tolerates the Metformin we can discuss the Phentermine. Pt voices understanding and agreement with the plan. Pain noted and pt was recommended to call her PCP for further evaluation and treatment, as needed      Patient Instructions   1) Metformin: Take one pill in the morning and one pill with dinner. Follow-up and Dispositions    · Return in about 3 months (around 6/25/2019).          Copy sent to:  Dr. Florinda Kowalski

## 2019-05-08 ENCOUNTER — TELEPHONE (OUTPATIENT)
Dept: NEUROLOGY | Age: 69
End: 2019-05-08

## 2019-05-08 NOTE — TELEPHONE ENCOUNTER
----- Message from [facility] sent at 5/8/2019 10:34 AM EDT -----  Regarding: Dr. Fuentes Milder: [phone]  Pt is requesting a call back to r/s her f/up appt with Dr. Loman Phalen and pt declined next available in October.

## 2019-05-09 NOTE — TELEPHONE ENCOUNTER
Left the patient a message stating that she has been placed on a cancellation list for a sooner appointment.

## 2019-06-25 DIAGNOSIS — R73.02 IMPAIRED GLUCOSE TOLERANCE: ICD-10-CM

## 2019-06-25 DIAGNOSIS — E66.01 CLASS 3 SEVERE OBESITY DUE TO EXCESS CALORIES WITH SERIOUS COMORBIDITY AND BODY MASS INDEX (BMI) OF 40.0 TO 44.9 IN ADULT (HCC): ICD-10-CM

## 2019-07-12 ENCOUNTER — TELEPHONE (OUTPATIENT)
Dept: SLEEP MEDICINE | Age: 69
End: 2019-07-12

## 2019-07-12 DIAGNOSIS — G47.33 OBSTRUCTIVE SLEEP APNEA (ADULT) (PEDIATRIC): Primary | ICD-10-CM

## 2019-07-12 NOTE — TELEPHONE ENCOUNTER
Patient called in wanting us to write order for a resmed f30 size medium mask and she also wants to review her recent download for her usage, she stated she is having issues with the seal of her current mask which is why she wanting to order the new mask which her  uses.

## 2019-07-23 ENCOUNTER — DOCUMENTATION ONLY (OUTPATIENT)
Dept: SLEEP MEDICINE | Age: 69
End: 2019-07-23

## 2020-02-21 ENCOUNTER — TELEPHONE (OUTPATIENT)
Dept: ENDOCRINOLOGY | Age: 70
End: 2020-02-21

## 2020-02-21 NOTE — TELEPHONE ENCOUNTER
Patient want to have her lab orders mailed out prior to her next visit. She also want to talk to a nurse about her thyroid issues. Please call back.  Thanks

## 2020-02-21 NOTE — TELEPHONE ENCOUNTER
Spoke with patient. She states [facility]. (Records requested) P [phone]  F [phone]. Patient states that her levels keep going up and down. Presently she is taking Tirosine 75 mcg daily and Nature Thyroid 97.5 mg 1/2 tab twice a day. She sates that her hair is falling out, her skin is dry and itchy and feels anxious. Patient would like  to start taking over her thyroid, as well as continuing to help her with her blood sugar control. Patient requests thyroid labs (and anything else) to be mailed to her. She will go to [facility] a week prior to her OV appt. Advised will request her labs and notes from Ms. Sara Mcclain. Patient has an appt 6/18/2020 with Dr. Celia Johns.

## 2020-02-24 DIAGNOSIS — R73.02 IMPAIRED GLUCOSE TOLERANCE: Primary | ICD-10-CM

## 2020-02-24 DIAGNOSIS — E03.9 ACQUIRED HYPOTHYROIDISM: ICD-10-CM

## 2020-02-24 DIAGNOSIS — E66.01 CLASS 3 SEVERE OBESITY DUE TO EXCESS CALORIES WITH SERIOUS COMORBIDITY AND BODY MASS INDEX (BMI) OF 40.0 TO 44.9 IN ADULT (HCC): ICD-10-CM

## 2020-06-04 ENCOUNTER — TELEPHONE (OUTPATIENT)
Dept: SLEEP MEDICINE | Age: 70
End: 2020-06-04

## 2020-06-04 NOTE — TELEPHONE ENCOUNTER
Patient called in with complaint of dry mouth and gaining weight on PAP. She said she doesn't feel like she is getting the best rest since gaining weight. Pt originally saw Dr. Cari Parker but it has been a year since her last appointment. Advised patient to make an appointment with XENA Johnston to have issues addressed. She will drop by our [id number] location to drop off her SD card prior to her appointment.

## 2020-06-05 ENCOUNTER — TELEPHONE (OUTPATIENT)
Dept: ENDOCRINOLOGY | Age: 70
End: 2020-06-05

## 2020-06-05 NOTE — TELEPHONE ENCOUNTER
----- Message from Beto Capellan sent at 6/5/2020  8:55 AM EDT -----  Regarding: Dr. Beltran Alvarez Message/Vendor Calls    Caller's first and last name:      Reason for call: Pt is headed to [address] [place] [address] this morning with an order dated from February, if a new one is needed please send it over.        Callback required yes/no and why: yes      Best contact number(s): [phone]      Details to clarify the request:      Beto Capellan

## 2020-06-06 LAB
ALBUMIN SERPL-MCNC: 4.3 G/DL (ref 3.7–4.7)
ALBUMIN/GLOB SERPL: 2.2 {RATIO} (ref 1.2–2.2)
ALP SERPL-CCNC: 63 IU/L (ref 39–117)
ALT SERPL-CCNC: 51 IU/L (ref 0–32)
AST SERPL-CCNC: 34 IU/L (ref 0–40)
BILIRUB SERPL-MCNC: 0.3 MG/DL (ref 0–1.2)
BUN SERPL-MCNC: 18 MG/DL (ref 8–27)
BUN/CREAT SERPL: 23 (ref 12–28)
CALCIUM SERPL-MCNC: 9 MG/DL (ref 8.7–10.3)
CHLORIDE SERPL-SCNC: 102 MMOL/L (ref 96–106)
CO2 SERPL-SCNC: 26 MMOL/L (ref 20–29)
CREAT SERPL-MCNC: 0.78 MG/DL (ref 0.57–1)
EST. AVERAGE GLUCOSE BLD GHB EST-MCNC: 126 MG/DL
FT4I SERPL CALC-MCNC: 1.2 (ref 1.2–4.9)
GLOBULIN SER CALC-MCNC: 2 G/DL (ref 1.5–4.5)
GLUCOSE SERPL-MCNC: 102 MG/DL (ref 65–99)
HBA1C MFR BLD: 6 % (ref 4.8–5.6)
POTASSIUM SERPL-SCNC: 4.7 MMOL/L (ref 3.5–5.2)
PROT SERPL-MCNC: 6.3 G/DL (ref 6–8.5)
SODIUM SERPL-SCNC: 140 MMOL/L (ref 134–144)
T3 SERPL-MCNC: 113 NG/DL (ref 71–180)
T3RU NFR SERPL: 23 % (ref 24–39)
T4 FREE SERPL-MCNC: 0.87 NG/DL (ref 0.82–1.77)
T4 SERPL-MCNC: 5 UG/DL (ref 4.5–12)
TSH SERPL DL<=0.005 MIU/L-ACNC: 3.45 UIU/ML (ref 0.45–4.5)

## 2020-06-15 NOTE — PROGRESS NOTES
[address] [facility] [place]. [place], [address]   Tel.  [phone]   Fax. [address] [facility] [address]   [place], [address] [facility]   Tel.  [phone]   Fax. [phone] [address]   Tel.  [phone]   Fax. [phone]       Subjective:   Omer Carranza is a 65 y.o. female who was seen by synchronous (real-time) audio-video technology on 6/17/2020. Consent:  She and/or her healthcare decision maker is aware that this patient-initiated Telehealth encounter is a billable service, with coverage as determined by her insurance carrier. She is aware that she may receive a bill and has provided verbal consent to proceed: Yes    I was at home while conducting this encounter. Patient verified with photo in chart. Omer Carranza is seen for a positive airway pressure follow-up, last seen by Dr. Adithya Lorenzo on 4/16/2018, prior notes reviewed in detail. Home sleep test 9/2016 showed AHI of 10.0/hr with a lowest SaO2 of 74%, duration of SaO2 < 88% 4 min. She  is seen today for follow up on device set up 10/27/2016. She reports no problems using the device. The following concerns reviewed:    Drowsiness no Problems exhaling no   Snoring no Forget to put on no   Mask Comfortable yes Can't fall asleep no   Dry Mouth yes Mask falls off no   Air Leaking no Frequent awakenings yes       She admits that her sleep has gotten worse in the past few months on PAP therapy using nasal mask and heated tubing. She complains of mouth being very dry. She does not use humidification and states she is unable to use it due to sinus issues. Suggested chin strap which she declined as not having worked in the past.  Suggested full face mask which she also declined as having not work in the past because it [employer] her up\".     Review of device download indicated:  Auto pressure: 9-12 cmH2O; Max Pressure: 11.9 cmH2O;  95th percentile Pressure: 11.9 cmH2O   95th Percentile Leak: 36.4 L/Min    % Used Days >= 4 hours: 100. Avg hours used:  9. Therapy Apnea Index averaged over PAP use: 1.9 /hr which reflects improved sleep breathing condition. [place] Sleepiness Score: 10 and Modified F.O.S.Q. Score Total / 2: 16 which reflects improved sleep quality over therapy time. Allergies   Allergen Reactions    Adhes. Band-Tape-Benzalkonium Swelling    Adhesive Swelling    Dairy Aid [Lactase] Unknown (comments)    Gluten Diarrhea     \"brain fog\", aching    Iodinated Contrast Media Other (comments)     Gets dizzy and shakey    Milk Containing Products Other (comments)     Aches and stomach cramps    Motrin [Ibuprofen] Unknown (comments)    Other Medication Other (comments)     Super sensitive to all medication    Soy Unknown (comments)    Sulfa (Sulfonamide Antibiotics) Unable to Obtain    Trulicity [Dulaglutide] Nausea Only     Stomach discomfort    Wheat Unknown (comments)       She has a current medication list which includes the following prescription(s): cholecalciferol, thyroid (pork), tirosint, magnesium glycinate, hydroxocobalamin, blood-glucose meter, glucose blood vi test strips, lancets, aspirin delayed-release, OTHER, and lactobacillus acidophilus. .      She  has a past medical history of Chest pain, unspecified, Chronic fatigue syndrome (3/16/2012), Fibromyalgia, Impaired glucose tolerance, Other dyspnea and respiratory abnormality, Palpitations, Shortness of breath, Skin cancer (3/16/2012), Unspecified hypothyroidism, and Unspecified vitamin D deficiency. Sleep Review of Systems: notable for Negative difficulty falling asleep; Positive awakenings at night; Negative early morning headaches; Negative memory problems; Negative concentration issues;  Negative chest pain; Negative shortness of breath; Negative significant joint pain at night; Negative significant muscle pain at night; Negative rashes or itching; Negative heartburn; Negative significant mood issues; daily afternoon naps     Objective:   Vitals reported by patient     Patient-Reported Vitals 6/17/2020   Patient-Reported Weight 254 lb   Patient-Reported Height 5' 5\"   Calculated BMI 42     Physical Exam completed by visual and auditory observation of patient with verbal input from patient. General:   Alert, oriented, not in acute distress   Eyes:  Anicteric Sclerae; no obvious strabismus   Nose:  No obvious nasal septum deviation    Neck:   Midline trachea, no visible mass   Chest/Lungs:  Respiratory effort normal, no visualized signs of difficulty breathing or respiratory distress   CVS:  No JVD   Extremities:  No obvious rashes noted on face, neck, or hands   Neuro:  No facial asymmetry, no focal deficits; no obvious tremor    Psych:  Normal affect,  normal countenance       Assessment:       ICD-10-CM ICD-9-CM    1. Obstructive sleep apnea (adult) (pediatric) G47.33 327.23 AMB SUPPLY ORDER   2. Thyroid disorder E07.9 246.9    3. Adult BMI 40.0-44.9 kg/sq m (HCC) Z68.41 V85.41        AHI = 10.0(9/2016). On APAP :  9-12 cmH2O. She is adherent with PAP therapy and PAP continues to benefit patient and remains necessary for control of her sleep apnea. Increase pressure max from 12 to 14 per pt request and current download showing 95th percentile Pressure: 11.9 cmH2O. Plan:     Follow-up and Dispositions    · Return in about 1 year (around 6/17/2021). * Change pressure setting to APAP 9-14 cm H2O. Changes made by provider in [address] [place] [facility] [place] Supplies ordered - nasal mask and heated tubing    Orders Placed This Encounter    AMB SUPPLY ORDER     Diagnosis: (G47.33) BRAVO (obstructive sleep apnea)  (primary encounter diagnosis)     Replacement Supplies for Positive Airway Pressure Therapy Device:   Duration of need: 99 months.  Nasal Cushion (Replace) 2 per month.  Nasal Interface Mask 1 every 3 months.    Pos Airway pressure chin strap   Headgear 1 every 6 months.  Tubing with non-heating element 1 every 3 months.  Filter(s) Disposable 2 per month.  Filter(s) Non-Disposable 1 every 6 months. .   [address] for Humidifier (Replace) 1 every 6 months. ANTONI Steel; NPI: [id number]    Electronically signed. Date:- 06/17/20       * Counseling was provided regarding the importance of regular PAP use with emphasis on ensuring sufficient total sleep time, proper sleep hygiene, and safe driving. * Re-enforced proper and regular cleaning for the device. * She was asked to contact our office for any problems regarding PAP therapy. 2. Thyroid Disorder - continue on her current regimen. 3. Recommended a dedicated weight loss program through appropriate diet and exercise regimen as significant weight reduction has been shown to reduce severity of obstructive sleep apnea. Patient's phone number [phone] (home)  was reviewed and confirmed for accuracy. She gives permission for messages regarding results and appointments to be left at that number. Pursuant to the emergency declaration under the [facility] waiver authority and the [employer] and [employer] General Act, this Virtual Visit was conducted, with patient's consent, to reduce the patient's risk of exposure to COVID-19 and provide continuity of care for an established patient. Services were provided through a video synchronous discussion virtually to substitute for in-person clinic visit. ANTONI Steel, [facility]  Electronically signed.  06/17/20

## 2020-06-17 ENCOUNTER — DOCUMENTATION ONLY (OUTPATIENT)
Dept: SLEEP MEDICINE | Age: 70
End: 2020-06-17

## 2020-06-17 ENCOUNTER — VIRTUAL VISIT (OUTPATIENT)
Dept: SLEEP MEDICINE | Age: 70
End: 2020-06-17

## 2020-06-17 DIAGNOSIS — E07.9 THYROID DISORDER: ICD-10-CM

## 2020-06-17 DIAGNOSIS — G47.33 OBSTRUCTIVE SLEEP APNEA (ADULT) (PEDIATRIC): Primary | ICD-10-CM

## 2020-06-17 NOTE — PATIENT INSTRUCTIONS
[address] [facility] [place]. [address] [facility], [address] Tel.  [phone] Fax. 100 [facility] 60 [place], [address] [facility] Tel.  [phone] Fax. [phone] [address] Tel.  [phone] Fax. [phone] Learning About CPAP for Sleep Apnea What is CPAP? CPAP is a small machine that you use at home every night while you sleep. It increases air pressure in your throat to keep your airway open. When you have sleep apnea, this can help you sleep better so you feel much better. CPAP stands for \"continuous positive airway pressure. \" The CPAP machine will have one of the following: · A mask that covers your nose and mouth · Prongs that fit into your nose · A mask that covers your nose only, the most common type. This type is called NCPAP. The N stands for \"nasal.\" Why is it done? CPAP is usually the best treatment for obstructive sleep apnea. It is the first treatment choice and the most widely used. Your doctor may suggest CPAP if you have: · Moderate to severe sleep apnea. · Sleep apnea and coronary artery disease (CAD) or heart failure. How does it help? · CPAP can help you have more normal sleep, so you feel less sleepy and more alert during the daytime. · CPAP may help keep heart failure or other heart problems from getting worse. · NCPAP may help lower your blood pressure. · If you use CPAP, your bed partner may also sleep better because you are not snoring or restless. What are the side effects? Some people who use CPAP have: · A dry or stuffy nose and a sore throat. · Irritated skin on the face. · Sore eyes. · Bloating. If you have any of these problems, work with your doctor to fix them. Here are some things you can try: · Be sure the mask or nasal prongs fit well. · See if your doctor can adjust the pressure of your CPAP. · If your nose is dry, try a humidifier. · If your nose is runny or stuffy, try decongestant medicine or a steroid nasal spray. If these things do not help, you might try a different type of machine. Some machines have air pressure that adjusts on its own. Others have air pressures that are different when you breathe in than when you breathe out. This may reduce discomfort caused by too much pressure in your nose. Where can you learn more? Go to Pirq.be Enter U792 in the search box to learn more about \"Learning About CPAP for Sleep Apnea. \"  
© 2296-7913 Healthwise, Incorporated. Care instructions adapted under license by [employer] (which disclaims liability or warranty for this information). This care instruction is for use with your licensed healthcare professional. If you have questions about a medical condition or this instruction, always ask your healthcare professional. [address] any warranty or liability for your use of this information. Content Version: 1.5.10429; Last Revised: January 11, 2010 PROPER SLEEP HYGIENE What to avoid · Do not have drinks with caffeine, such as coffee or black tea, for 8 hours before bed. · Do not smoke or use other types of tobacco near bedtime. Nicotine is a stimulant and can keep you awake. · Avoid drinking alcohol late in the evening, because it can cause you to wake in the middle of the night. · Do not eat a big meal close to bedtime. If you are hungry, eat a light snack. · Do not drink a lot of water close to bedtime, because the need to urinate may wake you up during the night. · Do not read or watch TV in bed. Use the bed only for sleeping and sexual activity. What to try · Go to bed at the same time every night, and wake up at the same time every morning. Do not take naps during the day. · Keep your bedroom quiet, dark, and cool. · Get regular exercise, but not within 3 to 4 hours of your bedtime. Chip Wolf · Sleep on a comfortable pillow and mattress. · If watching the clock makes you anxious, turn it facing away from you so you cannot see the time. · If you worry when you lie down, start a worry book. Well before bedtime, write down your worries, and then set the book and your concerns aside. · Try meditation or other relaxation techniques before you go to bed. · If you cannot fall asleep, get up and go to another room until you feel sleepy. Do something relaxing. Repeat your bedtime routine before you go to bed again. · Make your house quiet and calm about an hour before bedtime. Turn down the lights, turn off the TV, log off the computer, and turn down the volume on music. This can help you relax after a busy day. Drowsy Driving: The [place] [address] cites drowsiness as a causing factor in more than 902,159 police reported crashes annually, resulting in 76,000 injuries and 1,500 deaths. Other surveys suggest 55% of people polled have driven while drowsy in the past year, 23% had fallen asleep but not crashed, 3% crashed, and 2% had and accident due to drowsy driving. Who is at risk? Young Drivers: One study of drowsy driving accidents states that 55% of the drivers were under 25 years. Of those, 75% were male. Shift Workers and Travelers: People who work overnight or travel across time zones frequently are at higher risk of experiencing Circadian Rhythm Disorders. They are trying to work and function when their body is programed to sleep. Sleep Deprived: Lack of sleep has a serious impact on your ability to pay attention or focus on a task. Consistently getting less than the average of 8 hours your body needs creates partial or cumulative sleep deprivation.   
Untreated Sleep Disorders: Sleep Apnea, Narcolepsy, R.L.S., and other sleep disorders (untreated) prevent a person from getting enough restful sleep. This leads to excessive daytime sleepiness and increases the risk for drowsy driving accidents by up to 7 times. Medications / Alcohol: Even over the counter medications can cause drowsiness. Medications that impair a drivers attention should have a warning label. Alcohol naturally makes you sleepy and on its own can cause accidents. Combined with excessive drowsiness its effects are amplified. Signs of Drowsy Driving: * You don't remember driving the last few miles * You may drift out of your gee * You are unable to focus and your thoughts wander * You may yawn more often than normal 
 * You have difficulty keeping your eyes open / nodding off * Missing traffic signs, speeding, or tailgating Prevention-  
Good sleep hygiene, lifestyle and behavioral choices have the most impact on drowsy driving. There is no substitute for sleep and the average person requires 8 hours nightly. If you find yourself driving drowsy, stop and sleep. Consider the sleep hygiene tips provided during your visit as well. Medication Refill Policy: Refills for all medications require 1 week advance notice. Please have your pharmacy fax a refill request. We are unable to fax, or call in \"controled substance\" medications and you will need to pick these prescriptions up from our office. [employer] Activation Thank you for requesting access to [employer]. Please follow the instructions below to securely access and download your online medical record. [employer] allows you to send messages to your doctor, view your test results, renew your prescriptions, schedule appointments, and more. How Do I Sign Up? 1. In your internet browser, go to [email or web address]. 2. Click on the First Time User? Click Here link in the Sign In box. You will see the New Member Sign Up page. 3. Enter your [employer] Access Code exactly as it appears below.  You will not need to use this code after youve completed the sign-up process. If you do not sign up before the expiration date, you must request a new code. Oree Access Code: Activation code not generated Current Oree Status: Active (This is the date your Oree access code will ) 4. Enter the last four digits of your Social Security Number (xxxx) and Date of Birth (mm/dd/yyyy) as indicated and click Submit. You will be taken to the next sign-up page. 5. Create a My [employer] ID. This will be your Oree login ID and cannot be changed, so think of one that is secure and easy to remember. 6. Create a Oree password. You can change your password at any time. 7. Enter your Password Reset Question and Answer. This can be used at a later time if you forget your password. 8. Enter your e-mail address. You will receive e-mail notification when new information is available in [address]. 9. Click Sign Up. You can now view and download portions of your medical record. 10. Click the Download Summary menu link to download a portable copy of your medical information. Additional Information If you have questions, please call [phone]. Remember, Oree is NOT to be used for urgent needs. For medical emergencies, dial 911.

## 2020-07-13 ENCOUNTER — TELEPHONE (OUTPATIENT)
Dept: SLEEP MEDICINE | Age: 70
End: 2020-07-13

## 2020-07-13 NOTE — TELEPHONE ENCOUNTER
Patient stated she still has high leak; humidification setting increased. Patient states there is a part of the mask that keeps coming off. Patient advised to contact DME for new mask. Patient will look on [employer] website for a mask that is similar to the one she uses now. She is due for supplies per patient.

## 2020-07-27 ENCOUNTER — VIRTUAL VISIT (OUTPATIENT)
Dept: ENDOCRINOLOGY | Age: 70
End: 2020-07-27

## 2020-07-27 DIAGNOSIS — R73.02 IMPAIRED GLUCOSE TOLERANCE: ICD-10-CM

## 2020-07-27 DIAGNOSIS — E66.01 CLASS 3 SEVERE OBESITY DUE TO EXCESS CALORIES WITH SERIOUS COMORBIDITY AND BODY MASS INDEX (BMI) OF 40.0 TO 44.9 IN ADULT (HCC): Primary | ICD-10-CM

## 2020-07-27 RX ORDER — THYROID,PORK 97.5 MG
TABLET ORAL
COMMUNITY
Start: 2020-05-11 | End: 2020-07-27

## 2020-07-27 RX ORDER — PHENTERMINE HYDROCHLORIDE 37.5 MG/1
TABLET ORAL
Qty: 100 TAB | Refills: 1 | Status: SHIPPED | OUTPATIENT
Start: 2020-07-27 | End: 2021-12-07

## 2020-07-27 NOTE — PROGRESS NOTES
Chief Complaint   Patient presents with    Thyroid Problem     [email or web address] [phone] [email or web address]    Other     Pharmacy: [facility]            **THIS IS A VIRTUAL VISIT VIA A VIDEO ENCOUNTER. PATIENT AGREED TO HAVE THEIR CARE DELIVERED OVER VIDEO IN PLACE OF THEIR REGULARLY SCHEDULED OFFICE VISIT**      History of Present Illness: Yann Cornejo is a 65 y.o. female here for follow up of obesity and Impaired Glucose tolerance. Per the notes from Dr. Francine Delvalle in 2012 she has hx of hypothyroidism diagnosed in 1992, when she had fatigue and a goiter. She has seen several endocrinologists and her treatment regimen has changes multiple times over the years. She notes that in October of 2014 she began to have issues of vertigo and migraines. She notes she has been evaluated by Neurology and ENT here and at [facility]. She has had extensive work up and she reports that it was felt her thyroid issues were contributing to her symptoms. In April of 2014 she had a FT4 of 1.25 with TT4 of 0.9 and TSH of 0.16 by a physician at [facility]. At that time she was taking [employer] throid 162mg daily. At that time she was changes to 97.25 mg on T,T,Sa,Grimm and 130mg on M,W,F. She has not had any repeat thyroid function tests since that time. She notes that she was started on the Nature Throid at the end of 2012 when she felt that the synthroid/cytomel combination \"was not helping me feel better\". She notes she was started on the [employer] by Roxi Huang holistic/natural doctor\" but she cannot recall their name or exactly when. After our visit in June 2018 I sent her for Gregorio Stim Test to rule out  \"adrenal fatigue\". Her adrenal glands stimulated well. She was having nausea from the Trulicity so I stopped and started her on Metformin XR 500mg BID. At our visit in March 2019 she was not taking the Metformin because \"she did not want to take it\".   I encouraged her to restart the Metformin to help with her BGs and weight loss, Which she did agree to. We also discussed trying her on Phentermine, but agreed to not try two medications at the same time, so would try the Metformin and then at her follow up discuss trying the phentermine. Pt has not been back to see me since March 2019. Pt notes that she had a UTI recently, which she was treated. Her A1C in June 2020 was 6.0%. Her TSH was 3.45 with FT4 of 0.87, TT4 of 5.0 and TT3 of 113. Pt still gets her Armor and Cytomel from her PCP. She notes that \"I also go to a functional doctor who wants to draw my blood 3-4 hours after I take my thyroid medications. Pt notes she is not taking the Metformin, she notes she never started taking it. Pt notes \"I am always feeling hungry\". She is currently seeing Dr. Lm Gaston of Sleep medicine for BRAVO. She is followed by Rheumatology for RLS and Fibromyalgia. Pt wakes around 9AM  She will eat breakfast around 10AM, today she has not eaten lunch yesterday. She will have lunch around 3PM, yesterday she had a hamburger and regular soda. She will have dinner around 8-9PM and she notes that \"I get hungry and will eat all night. \". She will eat cookies and bread all night. She is not checking her BGs. Her PCP is treating her thyroid, HLD and Hypovitaminosis D.       Past Medical History:   Diagnosis Date    Chest pain, unspecified     Chronic fatigue syndrome 3/16/2012    Fibromyalgia     Impaired glucose tolerance     Other dyspnea and respiratory abnormality     Palpitations     Shortness of breath     Skin cancer 3/16/2012    Unspecified hypothyroidism     goiter    Unspecified vitamin D deficiency      Past Surgical History:   Procedure Laterality Date    HX BREAST LUMPECTOMY  7/2013    right breast    HX TONSILLECTOMY      HX TONSILLECTOMY      HX UROLOGICAL      urethral opening was widened     Current Outpatient Medications   Medication Sig    glucose blood VI test strips (ONETOUCH VERIO) strip Check blood sugar once daily. DX: R73.72    OTHER Take 70 mg by mouth daily. Indications: Progestrone    cholecalciferol, VITAMIN D3, (VITAMIN D3) 5,000 unit tab tablet 90633 iu (in drop form), K2    thyroid, pork, (NATURE-THROID) 81.25 mg tab     TIROSINT 50 mcg cap daily.  LACTOBACILLUS ACIDOPHILUS (PROBIOTIC PO) Take  by mouth. 1 tab 3 times a week    MAGNESIUM GLYCINATE, BULK, by Does Not Apply route.  lancets (ONE TOUCH DELICA) 33 gauge misc Check blood sugar once daily. DX: R73.72     No current facility-administered medications for this visit. Allergies   Allergen Reactions    Adhes.  Band-Tape-Benzalkonium Swelling    Adhesive Swelling    Dairy Aid [Lactase] Unknown (comments)    Gluten Diarrhea     \"brain fog\", aching    Iodinated Contrast Media Other (comments)     Gets dizzy and shakey    Milk Containing Products Other (comments)     Aches and stomach cramps    Motrin [Ibuprofen] Unknown (comments)    Other Medication Other (comments)     Super sensitive to all medication    Soy Unknown (comments)    Sulfa (Sulfonamide Antibiotics) Unable to Obtain    Trulicity [Dulaglutide] Nausea Only     Stomach discomfort    Wheat Unknown (comments)     Family History   Problem Relation Age of Onset    Coronary Artery Disease Mother     Thyroid Disease Mother     Diabetes Mother     Cancer Mother         Multiple Myeloma    Cancer Father         Colon cancer    Arthritis-osteo Father     Heart Disease Father     High Cholesterol Brother     Thyroid Disease Brother         thyroid cancer    Cancer Brother         Thyroid and Skin    Diabetes Maternal Aunt     Hypertension Other     Heart Disease Other     Depression Other     Anxiety Other     Cancer Sister         Skin    Cancer Brother         Multiple Myeloma and skin    Cancer Brother         Skin     Social History     Socioeconomic History    Marital status:      Spouse name: Not on file    Number of children: Not on file    Years of education: Not on file    Highest education level: Not on file   Occupational History    Not on file   Social Needs    Financial resource strain: Not on file    Food insecurity     Worry: Not on file     Inability: Not on file    Transportation needs     Medical: Not on file     Non-medical: Not on file   Tobacco Use    Smoking status: Never Smoker    Smokeless tobacco: Never Used   Substance and Sexual Activity    Alcohol use: Yes     Alcohol/week: 1.0 standard drinks     Types: 1 Glasses of wine per week     Comment: Rare    Drug use: No    Sexual activity: Yes     Partners: Male   Lifestyle    Physical activity     Days per week: Not on file     Minutes per session: Not on file    Stress: Not on file   Relationships    Social connections     Talks on phone: Not on file     Gets together: Not on file     Attends Faith service: Not on file     Active member of club or organization: Not on file     Attends meetings of clubs or organizations: Not on file     Relationship status: Not on file    Intimate partner violence     Fear of current or ex partner: Not on file     Emotionally abused: Not on file     Physically abused: Not on file     Forced sexual activity: Not on file   Other Topics Concern    Not on file   Social History Narrative    L[facility] in [facility] with . Used to work as a  for [employer]. Review of Systems:  - Negative except as noted in HPI    Physical Examination:  There were no vitals taken for this visit.   - GENERAL: NCAT, Appears well nourished   - EYES: EOMI, non-icteric, no proptosis   - Ear/Nose/Throat: NCAT, no visible inflammation or masses   - CARDIOVASCULAR: no cyanosis, no visible JVD   - RESPIRATORY: respiratory effort normal without any distress or labored breathing   - MUSCULOSKELETAL: Normal ROM of neck and upper extremities observed   - SKIN: No rash on face   - NEUROLOGIC:  No facial asymmetry (Cranial nerve 7 motor function), No gaze palsy   - PSYCHIATRIC: Normal affect, Normal insight and judgement   -     Data Reviewed:   Component      Latest Ref Rng & Units 6/5/2020 6/5/2020 6/5/2020 6/5/2020          11:11 AM 11:11 AM 11:11 AM 11:11 AM   TSH      0.450 - 4.500 uIU/mL   3.450    T4, Total      4.5 - 12.0 ug/dL   5.0    T3 Uptake      24 - 39 %   23 (L)    Free Thyroxine Index (FTI)      1.2 - 4.9   1.2    Hemoglobin A1c, (calculated)      4.8 - 5.6 %    6.0 (H)   Estimated average glucose      mg/dL    126   T4, Free      0.82 - 1.77 ng/dL  0.87     T3, total      71 - 180 ng/dL 113        Component      Latest Ref Rng & Units 6/5/2020          11:11 AM   Glucose      65 - 99 mg/dL 102 (H)   BUN      8 - 27 mg/dL 18   Creatinine      0.57 - 1.00 mg/dL 0.78   GFR est non-AA      >59 mL/min/1.73 76   GFR est AA      >59 mL/min/1.73 88   BUN/Creatinine ratio      12 - 28 23   Sodium      134 - 144 mmol/L 140   Potassium      3.5 - 5.2 mmol/L 4.7   Chloride      96 - 106 mmol/L 102   CO2      20 - 29 mmol/L 26   Calcium      8.7 - 10.3 mg/dL 9.0   Protein, total      6.0 - 8.5 g/dL 6.3   Albumin      3.7 - 4.7 g/dL 4.3   GLOBULIN, TOTAL      1.5 - 4.5 g/dL 2.0   A-G Ratio      1.2 - 2.2 2.2   Bilirubin, total      0.0 - 1.2 mg/dL 0.3   Alk. phosphatase      39 - 117 IU/L 63   AST      0 - 40 IU/L 34   ALT      0 - 32 IU/L 51 (H)       Assessment/Plan:   1) Impaired Glucose tolerance > Pt is not taking the Metformin and is not checking her BGs. For now will work on controlling appetite to see if her BGs/A1C improve. Will start pt on Phentermine 18.75mg daily. Discussed potential side effects: increase heart rate or blood pressure, anxiety or trouble sleeping. 2) Obesity > See #1. Pt voices understanding and agreement with the plan.   Pain noted and pt was recommended to call her PCP for further evaluation and treatment, as needed      Copy sent to:  Dr. Humberto Bledsoe

## 2020-07-27 NOTE — PROGRESS NOTES
Lab Results   Component Value Date/Time    TSH 3.450 06/05/2020 11:11 AM    T3 Uptake 23 (L) 06/05/2020 11:11 AM    T4, Free 0.87 06/05/2020 11:11 AM    T4, Total 5.0 06/05/2020 11:11 AM

## 2020-10-19 DIAGNOSIS — E66.01 CLASS 3 SEVERE OBESITY DUE TO EXCESS CALORIES WITH SERIOUS COMORBIDITY AND BODY MASS INDEX (BMI) OF 40.0 TO 44.9 IN ADULT (HCC): ICD-10-CM

## 2020-10-19 DIAGNOSIS — R73.02 IMPAIRED GLUCOSE TOLERANCE: ICD-10-CM

## 2021-02-02 ENCOUNTER — TELEPHONE (OUTPATIENT)
Dept: SLEEP MEDICINE | Age: 71
End: 2021-02-02

## 2021-02-02 NOTE — TELEPHONE ENCOUNTER
LVM returning the patient's call regarding a bill from 2016. I left the number to the billing dept ([phone]) on her voicemail.

## 2021-03-04 ENCOUNTER — TELEPHONE (OUTPATIENT)
Dept: SLEEP MEDICINE | Age: 71
End: 2021-03-04

## 2021-03-04 DIAGNOSIS — G47.33 OBSTRUCTIVE SLEEP APNEA (ADULT) (PEDIATRIC): Primary | ICD-10-CM

## 2021-03-04 NOTE — TELEPHONE ENCOUNTER
Patient phoned the office with complaints of waking up with a very dry mouth and throat. Patient is requesting a new mask. She also wants you to look at her readings to see if she stopped breathing or getting too much air.

## 2021-03-05 ENCOUNTER — DOCUMENTATION ONLY (OUTPATIENT)
Dept: SLEEP MEDICINE | Age: 71
End: 2021-03-05

## 2021-03-05 NOTE — TELEPHONE ENCOUNTER
Armando Ryan (: 1950) last seen by me on 2020, previously seen by Dr. Malorie Crowe on 2018, prior notes reviewed in detail. Home sleep test 2016 showed AHI of 10.0/hr with a lowest SaO2 of 74%, duration of SaO2 < 88% 4 min. APAP device set up 10/27/2016. Pressure max increased at prior visit to APAP 9-14 cm H2O. Current device download reviewed, daily use with avg of 8 hours, AHI well controlled at 1.7/hr, elevated leak at 34.6 L/min. She stated at prior visit she was not using humidification due to sinus concerns, she had a dry mouth at that time with nasal mask. Patient called office with concerns about dry mouth. Called patient to review device data and dryness concerns. She would be interested in a full face below the nose style mask    Orders Placed This Encounter    AMB SUPPLY ORDER     Diagnosis: (G47.33) BRAVO (obstructive sleep apnea)  (primary encounter diagnosis)     Replacement Supplies for Positive Airway Pressure Therapy Device:   Duration of need: 99 months.  Full Face Mask 1 every 3 months. Airfit F30 below the nose style   Full Face Mask Cushion 1 per month.  Headgear 1 every 6 months.  Pos Airway pressure chin strap     Tubing with heating element 1 every 3 months.  Filter(s) Disposable 2 per month.  Filter(s) Non-Disposable 1 every 6 months. .   [facility] for Humidifier (Replace) 1 every 6 months. ROWAN Funk-BC; NPI: [id number]    Electronically signed.  Date:- 21     Brock Garcia NP, [facility]  21

## 2021-03-08 ENCOUNTER — TELEPHONE (OUTPATIENT)
Dept: SLEEP MEDICINE | Age: 71
End: 2021-03-08

## 2021-03-08 NOTE — TELEPHONE ENCOUNTER
Patient called back a second time concerning if the pressures were changed. Increased ramp time and lowered ramp starting pressure. Patient does not like to have heated humidification. She uses passover only due to allergies.

## 2021-03-08 NOTE — TELEPHONE ENCOUNTER
Download reviewed, agree with tech regarding ramp setting changes. Pressure max increased at prior visit to APAP 9-14 cm H2O. Current device download reviewed, daily use with avg of 8 hours, AHI well controlled at 1.7/hr, elevated leak at 34.6 L/min. Spoke with patient 3/5/21 regarding current settings and concern over dry mouth - mask style change recommended. Patient was last seen by me on 6/17/2020, previously seen by Dr. Lori Reed 4/16/2018, prior notes reviewed in detail.  Home sleep test 9/2016 showed AHI of 10.0/hr with a lowest SaO2 of 74%, duration of SaO2 < 88% 4 min. APAP device set up 10/27/2016.     Patient should be scheduled for visit if she continues to have issue with device to discuss alternative therapy options.      Stephanie Fontana NP, [facility]  03/08/21

## 2021-03-08 NOTE — TELEPHONE ENCOUNTER
Patient called concerning struggling to exhale with the machine and waking. She feels like she isn't as refreshed. When she first puts on the mask, she also feels like it is hard to exhale. Detailed and 30 day download in chart.     Please advise

## 2021-08-30 ENCOUNTER — TELEPHONE (OUTPATIENT)
Dept: SLEEP MEDICINE | Age: 71
End: 2021-08-30

## 2021-08-30 NOTE — TELEPHONE ENCOUNTER
Patient called and left message with office that she is having trouble tolerating PAP. Gasping for air on PAP.  [phone]

## 2021-09-27 NOTE — TELEPHONE ENCOUNTER
Patient feels that she is getting too much air support or not enough . She is also not feeling rested tired and achy. Mrs. Court Segal feels as she is foggy in thought at times . Download added to chart for review. Shared with Mrs. Rodriguez I would reach back out to her  adjustments to the device once data had been reviewed.     Per review with Dr. Gwendel Felty sent to device  Set Mode to AutoSet   Set Min Pressure to 10.0 cmH2O   Set Max Pressure to 12.0 cmH2O   Set Response to Standard   Set EPR to Fulltime   Set EPR level to 2   Set Ramp enable to On   Set Ramp time to 5 min   Set Start pressure to 5.0 cmH2O  Previous Settings  Set Mode to AutoSet   Set Min Pressure to 9.0 cmH2O   Set Max Pressure to 14.0 cmH2O   Set Response to Standard   Set EPR to Fulltime   Set EPR level to 2   Set Ramp enable to On   Set Ramp time to 5 min   Set Start pressure to 5.0 cmH2O

## 2021-09-27 NOTE — TELEPHONE ENCOUNTER
Voicemail message received from patient awaiting call back from sleep technologist regarding pressure settings.

## 2021-09-29 NOTE — TELEPHONE ENCOUNTER
Eloina Zelaya (: 1950) last seen by me on 2020, previously seen by Dr. Rashid Salazar on 2018, prior notes reviewed in detail. Home sleep test 2016 showed AHI of 10.0/hr with a lowest SaO2 of 74%, duration of SaO2 < 88% 4 min. AHI = 10.0(2016). On Resmed APAP :  10-12 cmH2O. Set up 10/27/2016. Review of recent device data showed well controlled AHI at prior settings of APAP [id number]. :     Auto pressure: 9-14 cmH2O; Max Pressure: 13.1 cmH2O;  95th percentile Pressure: 12.2 cmH2O   95th Percentile Leak: 32.3 L/Min     CMS Compliance % Used Days >= 4 hours: 100. Average daily use of 8:11 hours. Therapy Apnea Index averaged over PAP use: 0.9 /hr which reflects improved sleep breathing condition. Device setting changes made to APAP 10-12 on 2021. Online data reviewed 2021 - AHI remains well controlled. Last visit 2020 - patient should be scheduled for annual follow up.     Rosetta Colby NP, [facility]  21

## 2021-10-15 ENCOUNTER — NURSE TRIAGE (OUTPATIENT)
Dept: OTHER | Facility: CLINIC | Age: 71
End: 2021-10-15

## 2021-10-15 NOTE — TELEPHONE ENCOUNTER
Received call from Edward at [facility] with Red Flag Complaint. Brief description of triage: Patient reports multiple symptoms including intermittent dizziness for 6 months or more. Triage indicates for patient to seen in office within next 2 weeks. Care advice provided, patient verbalizes understanding; denies any other questions or concerns; instructed to call back for any new or worsening symptoms. Writer provided warm transfer to BODØ at [facility] for appointment scheduling. Attention Provider: Thank you for allowing me to participate in the care of your patient. The patient was connected to triage in response to information provided to the [facility]. Please do not respond through this encounter as the response is not directed to a shared pool. Reason for Disposition   Dizziness not present now, but is a chronic symptom (recurrent or ongoing AND lasting > 4 weeks)    Answer Assessment - Initial Assessment Questions  1. DESCRIPTION: \"Describe your dizziness. \"      Reporting 6 months history of joint pain, inability to rest, anxiety, short tempered, vision changes (evaluated by optometrist yesterday), feels \"wobbly\"     2. LIGHTHEADED: \"Do you feel lightheaded? \" (e.g., somewhat faint, woozy, weak upon standing)     Feels \"wobbly\" for 6 months  Dizziness \"comes and goes\"    3. VERTIGO: \"Do you feel like either you or the room is spinning or tilting? \" (i.e. vertigo)  Denies    4. SEVERITY: \"How bad is it? \"  \"Do you feel like you are going to faint? \" \"Can you stand and walk? \"    - MILD - walking normally    - MODERATE - interferes with normal activities (e.g., work, school)     - SEVERE - unable to stand, requires support to walk, feels like passing out now. Mild    5. ONSET:  \"When did the dizziness begin? \"  6 months ago    6. AGGRAVATING FACTORS: \"Does anything make it worse? \" (e.g., standing, change in head position)  Unknown    7. HEART RATE: \"Can you tell me your heart rate? \" \"How many beats in 15 seconds? \"  (Note: not all patients can do this)        History of PVCs, better since thyroid is regulated    8. CAUSE: \"What do you think is causing the dizziness? \"  Unknown    9. RECURRENT SYMPTOM: \"Have you had dizziness before? \" If so, ask: \"When was the last time? \" \"What happened that time? \"  Unknown    10. OTHER SYMPTOMS: \"Do you have any other symptoms? \" (e.g., fever, chest pain, vomiting, diarrhea, bleeding)      Denies fever, chest pain, vomiting  Reports both diarrhea and constipation    11. PREGNANCY: \"Is there any chance you are pregnant? \" \"When was your last menstrual period? \"        n/a    Protocols used: [id number]

## 2021-10-27 ENCOUNTER — TELEPHONE (OUTPATIENT)
Dept: PRIMARY CARE CLINIC | Age: 71
End: 2021-10-27

## 2021-10-27 NOTE — TELEPHONE ENCOUNTER
----- Message from Sania Khalil sent at 10/25/2021  3:23 PM EDT -----  Subject: Appointment Request    Reason for Call: New Patient Request Appointment    QUESTIONS  Type of Appointment? New Patient/New to Provider  Reason for appointment request? No appointments available during search  Additional Information for Provider? Needs to re-schedule her NP   appointment. Wants to know if 11-2 would be available. Can do before   12:00p or after 2:30p.  ---------------------------------------------------------------------------  --------------  CALL BACK INFO  What is the best way for the office to contact you? OK to leave message on   voicemail  Preferred Call Back Phone Number? [phone]  ---------------------------------------------------------------------------  --------------  SCRIPT ANSWERS  Relationship to Patient? Self  Have your symptoms changed? No  Is this the first appointment to establish care for a ? No  Have you been diagnosed with, awaiting test results for, or told that you   are suspected of having COVID-19 (Coronavirus)? (If patient has tested   negative or was tested as a requirement for work, school, or travel and   not based on symptoms, answer no)? No  Within the past two weeks have you developed any of the following symptoms   (answer no if symptoms have been present longer than 2 weeks or began   more than 2 weeks ago)? Fever or Chills, Cough, Shortness of breath or   difficulty breathing, Loss of taste or smell, Sore throat, Nasal   congestion, Sneezing or runny nose, Fatigue or generalized body aches   (answer no if pain is specific to a body part e.g. back pain), Diarrhea,   Headache? No  Have you had close contact with someone with COVID-19 in the last 14 days? No  (Service Expert  click yes below to proceed with [employer] As Usual   Scheduling)?  Yes

## 2021-11-10 ENCOUNTER — TELEPHONE (OUTPATIENT)
Dept: PRIMARY CARE CLINIC | Age: 71
End: 2021-11-10

## 2021-11-10 NOTE — TELEPHONE ENCOUNTER
----- Message from Jason Puente sent at 10/25/2021  3:23 PM EDT -----  Subject: Appointment Request    Reason for Call: New Patient Request Appointment    QUESTIONS  Type of Appointment? New Patient/New to Provider  Reason for appointment request? No appointments available during search  Additional Information for Provider? Needs to re-schedule her NP   appointment. Wants to know if 11-2 would be available. Can do before   12:00p or after 2:30p.  ---------------------------------------------------------------------------  --------------  CALL BACK INFO  What is the best way for the office to contact you? OK to leave message on   voicemail  Preferred Call Back Phone Number? [phone]  ---------------------------------------------------------------------------  --------------  SCRIPT ANSWERS  Relationship to Patient? Self  Have your symptoms changed? No  Is this the first appointment to establish care for a ? No  Have you been diagnosed with, awaiting test results for, or told that you   are suspected of having COVID-19 (Coronavirus)? (If patient has tested   negative or was tested as a requirement for work, school, or travel and   not based on symptoms, answer no)? No  Within the past two weeks have you developed any of the following symptoms   (answer no if symptoms have been present longer than 2 weeks or began   more than 2 weeks ago)? Fever or Chills, Cough, Shortness of breath or   difficulty breathing, Loss of taste or smell, Sore throat, Nasal   congestion, Sneezing or runny nose, Fatigue or generalized body aches   (answer no if pain is specific to a body part e.g. back pain), Diarrhea,   Headache? No  Have you had close contact with someone with COVID-19 in the last 14 days? No  (Service Expert  click yes below to proceed with [employer] As Usual   Scheduling)?  Yes

## 2021-12-07 ENCOUNTER — OFFICE VISIT (OUTPATIENT)
Dept: PRIMARY CARE CLINIC | Age: 71
End: 2021-12-07
Payer: MEDICARE

## 2021-12-07 VITALS
BODY MASS INDEX: 40.56 KG/M2 | WEIGHT: 252.4 LBS | TEMPERATURE: 97 F | HEIGHT: 66 IN | RESPIRATION RATE: 16 BRPM | OXYGEN SATURATION: 96 %

## 2021-12-07 DIAGNOSIS — H51.9 ABNORMAL EYE MOVEMENTS: Primary | ICD-10-CM

## 2021-12-07 DIAGNOSIS — Z99.89 OSA ON CPAP: ICD-10-CM

## 2021-12-07 DIAGNOSIS — E78.5 HYPERLIPIDEMIA, UNSPECIFIED HYPERLIPIDEMIA TYPE: ICD-10-CM

## 2021-12-07 DIAGNOSIS — R29.818 OTHER SYMPTOMS AND SIGNS INVOLVING THE NERVOUS SYSTEM: ICD-10-CM

## 2021-12-07 DIAGNOSIS — F41.9 ANXIETY: ICD-10-CM

## 2021-12-07 DIAGNOSIS — E03.9 ACQUIRED HYPOTHYROIDISM: ICD-10-CM

## 2021-12-07 DIAGNOSIS — G47.33 OSA ON CPAP: ICD-10-CM

## 2021-12-07 DIAGNOSIS — R73.03 PREDIABETES: ICD-10-CM

## 2021-12-07 DIAGNOSIS — R42 DIZZINESS: ICD-10-CM

## 2021-12-07 DIAGNOSIS — I10 PRIMARY HYPERTENSION: ICD-10-CM

## 2021-12-07 PROCEDURE — 3017F COLORECTAL CA SCREEN DOC REV: CPT | Performed by: FAMILY MEDICINE

## 2021-12-07 PROCEDURE — G8427 DOCREV CUR MEDS BY ELIG CLIN: HCPCS | Performed by: FAMILY MEDICINE

## 2021-12-07 PROCEDURE — G8510 SCR DEP NEG, NO PLAN REQD: HCPCS | Performed by: FAMILY MEDICINE

## 2021-12-07 PROCEDURE — 1090F PRES/ABSN URINE INCON ASSESS: CPT | Performed by: FAMILY MEDICINE

## 2021-12-07 PROCEDURE — G8399 PT W/DXA RESULTS DOCUMENT: HCPCS | Performed by: FAMILY MEDICINE

## 2021-12-07 PROCEDURE — 99203 OFFICE O/P NEW LOW 30 MIN: CPT | Performed by: FAMILY MEDICINE

## 2021-12-07 PROCEDURE — G8536 NO DOC ELDER MAL SCRN: HCPCS | Performed by: FAMILY MEDICINE

## 2021-12-07 PROCEDURE — G8417 CALC BMI ABV UP PARAM F/U: HCPCS | Performed by: FAMILY MEDICINE

## 2021-12-07 PROCEDURE — 1101F PT FALLS ASSESS-DOCD LE1/YR: CPT | Performed by: FAMILY MEDICINE

## 2021-12-07 RX ORDER — METHENAMINE, SODIUM PHOSPHATE, MONOBASIC, MONOHYDRATE, PHENYL SALICYLATE, METHYLENE BLUE, AND HYOSCYAMINE SULFATE 118; 40.8; 36; 10; .12 MG/1; MG/1; MG/1; MG/1; MG/1
CAPSULE ORAL
COMMUNITY
End: 2021-12-23

## 2021-12-07 RX ORDER — DIAPER,BRIEF,ADULT, DISPOSABLE
EACH MISCELLANEOUS
COMMUNITY
End: 2022-07-15

## 2021-12-07 RX ORDER — THYROID, PORCINE 15 MG/1
15 TABLET ORAL DAILY
COMMUNITY
Start: 2021-10-20 | End: 2022-08-03

## 2021-12-07 RX ORDER — DICLOFENAC SODIUM 10 MG/G
GEL TOPICAL 2 TIMES DAILY
COMMUNITY
End: 2022-03-17 | Stop reason: ALTCHOICE

## 2021-12-07 RX ORDER — CEPHALEXIN 500 MG/1
CAPSULE ORAL
COMMUNITY
Start: 2021-11-30 | End: 2021-12-23 | Stop reason: ALTCHOICE

## 2021-12-07 NOTE — PATIENT INSTRUCTIONS
Learning About FAST: Stroke Warning Signs  What is FAST? FAST is a simple way to remember the main symptoms of stroke. Recognizing these symptoms helps you know when to call for medical help. FAST stands for:  · F ace drooping. · A rm weakness. · S peech difficulty. · T iris to call 911. Other stroke symptoms can include sudden confusion, a severe headache, and problems with vision or balance. What happens when you have a stroke? A stroke occurs when a blood vessel to the brain bursts or is blocked by a blood clot. Within minutes, the nerve cells in that part of the brain die. As a result, the part of the body controlled by those cells cannot work properly. The effects of a stroke may range from mild to severe. They may get better, or they may last the rest of your life. A stroke can affect vision, speech, behavior, thought processes, and your ability to move. It can cause symptoms that may include:  · Sudden numbness, tingling, weakness, or loss of movement in your face, arm, or leg, especially on only one side of your body. · Sudden vision changes. · Sudden trouble speaking. · Sudden confusion or trouble understanding simple statements. · Sudden problems with walking or balance. · A sudden, severe headache that is different from past headaches. Why is it important to get help FAST? Quick treatment may save your life. And it may reduce the damage in your brain so that you have fewer problems after the stroke. When you know stroke symptoms, you will know when it's important to call for medical help. Where can you learn more? Go to http://www.gray.com/  Enter C139 in the search box to learn more about \"Learning About FAST: Stroke Warning Signs. \"  Current as of: July 6, 2021               Content Version: 13.0  © 2210-9764 [employer].    Care instructions adapted under license by [employer] (which disclaims liability or warranty for this information).  If you have questions about a medical condition or this instruction, always ask your healthcare professional. [place] [address] any warranty or liability for your use of this information.

## 2021-12-07 NOTE — PROGRESS NOTES
Identified pt with two pt identifiers(name and ). Reviewed record in preparation for visit and have obtained necessary documentation.   Chief Complaint   Patient presents with   [address] Maintenance Due   Topic    Hepatitis C Screening     COVID-19 Vaccine (1)    DTaP/Tdap/Td series (1 - Tdap)    Colorectal Cancer Screening Combo     Shingrix Vaccine Age 50> (1 of 2)    Pneumococcal 65+ years (1 of 1 - PPSV23)    Medicare Yearly Exam     Breast Cancer Screen Mammogram     Flu Vaccine (1)        Visit Vitals  /73 (BP 1 Location: Left upper arm, BP Patient Position: Sitting, BP Cuff Size: Large adult)   Pulse 75   Temp 97 °F (36.1 °C) (Temporal)   Resp 16   Ht 5' 5.5\" (1.664 m)   Wt 252 lb 6.4 oz (114.5 kg)   SpO2 96%   BMI 41.36 kg/m²     Pain Scale: 8/10

## 2021-12-07 NOTE — PROGRESS NOTES
HPI     Chief Complaint   Patient presents with   [address]     She is a 66 y.o. female who presents for establishing care. Hx of hypothyroidism. Was started recently on cholesterol medicine and Norvasc for BP and HLD but has not started it. States she does not want to start it because she reacts to things easily. BP was high to 150s at her Cardiologist which is why they started her on it. States her pulse is very labile and sometimes it makes her dizzy. Did not do an EKG at last Cards visit 11/30 but did 6 months ago and told her it looked okay. She has been told she has PACs and PVCs. She does have BRAVO and wears her CPAP machine. Does not want to repeat EKG. Was told she was prediabetic 4-5 years ago. States her A1c is always 5.8-5.9. Currently on abx for a UTI. She has Barretts esophagus. Has anxiety for the past 4-5 months. States she feels flustered easily. No unilateral weakness, facial asymetry, numbness/ tingling, slurred speech. Review of Systems   Constitutional: Negative for chills and fever. HENT: Negative for sore throat. Respiratory: Negative for cough. Cardiovascular: Negative for chest pain and palpitations. Reviewed PmHx, RxHx, FmHx, SocHx, AllgHx and updated and dated in the chart. Physical Exam:  Visit Vitals  Temp 97 °F (36.1 °C) (Temporal)   Resp 16   Ht 5' 5.5\" (1.664 m)   Wt 252 lb 6.4 oz (114.5 kg)   SpO2 96%   BMI 41.36 kg/m²     Physical Exam  Vitals and nursing note reviewed. Constitutional:       General: She is not in acute distress. Appearance: Normal appearance. She is not ill-appearing. Eyes:      Comments: Eyes notably oscillate to R when she maintains focus. BEATA BL. EOM intact. She denies pain. States this is a chronic issue. Cardiovascular:      Rate and Rhythm: Normal rate and regular rhythm. Heart sounds: No murmur heard. Pulmonary:      Effort: Pulmonary effort is normal. No respiratory distress.       Breath sounds: Normal breath sounds. Neurological:      General: No focal deficit present. Mental Status: She is alert. Mental status is at baseline. Cranial Nerves: No cranial nerve deficit. Sensory: No sensory deficit. Motor: No weakness. Coordination: Coordination normal.      Gait: Gait normal.      Comments: Mindy Hallpike neg BL. Psychiatric:         Mood and Affect: Mood normal.         Behavior: Behavior normal.       No results found for this or any previous visit (from the past 12 hour(s)). Assessment / Plan     Diagnoses and all orders for this visit:    1. Abnormal eye movements  -     CT HEAD WO CONT; Future    2. Other symptoms and signs involving the nervous system   -     CT HEAD WO CONT; Future    3. Dizziness  -     POSITIONAL NYSTAGMUS TEST    4. Acquired hypothyroidism    5. Hyperlipidemia, unspecified hyperlipidemia type    6. Primary hypertension    7. BRAVO on CPAP    8. Prediabetes    9. Anxiety       Of note on her neuro exam for dizziness here eyes involuntaryoscillate  to the R bilaterally. She states this is chronic and she was told by her eye doctor would improve with her last eye surgery but never have. Wonders if this is causing her dizziness. Could be nystagmus but will CT head to rule out any intracranial pathology. May need Neuro/Optho eval again if worsening. I have discussed the diagnosis with the patient and the intended plan as seen in the above orders. The patient has received an after-visit summary and questions were answered concerning future plans. I have discussed medication side effects and warnings with the patient as well.     Bebeto Saravia,

## 2021-12-08 ENCOUNTER — TELEPHONE (OUTPATIENT)
Dept: PRIMARY CARE CLINIC | Age: 71
End: 2021-12-08

## 2021-12-08 ENCOUNTER — HOSPITAL ENCOUNTER (OUTPATIENT)
Dept: CT IMAGING | Age: 71
Discharge: HOME | End: 2021-12-08
Attending: FAMILY MEDICINE
Payer: MEDICARE

## 2021-12-08 DIAGNOSIS — R29.818 OTHER SYMPTOMS AND SIGNS INVOLVING THE NERVOUS SYSTEM: ICD-10-CM

## 2021-12-08 DIAGNOSIS — H51.9 ABNORMAL EYE MOVEMENTS: ICD-10-CM

## 2021-12-08 PROCEDURE — 70450 CT HEAD/BRAIN W/O DYE: CPT

## 2021-12-08 NOTE — TELEPHONE ENCOUNTER
Pt calling in states that Dr. Tiffanie Gamino wanted her to have a brain scan today at 9:00 am at [facility]. She states she drove over an hour to get there in they had no record of appt.
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
## 2021-12-13 ENCOUNTER — OFFICE VISIT (OUTPATIENT)
Dept: SLEEP MEDICINE | Age: 71
End: 2021-12-13

## 2021-12-13 ENCOUNTER — TELEPHONE (OUTPATIENT)
Dept: SLEEP MEDICINE | Age: 71
End: 2021-12-13

## 2021-12-13 DIAGNOSIS — G47.33 OSA (OBSTRUCTIVE SLEEP APNEA): Primary | ICD-10-CM

## 2021-12-13 NOTE — PROGRESS NOTES
Patient came in today and her 's old device (RemStar Auto / System One) was adjusted to 10-12 cmH2O.

## 2021-12-13 NOTE — TELEPHONE ENCOUNTER
Patient called and stated that her device was not working properly and she wanted to know if her husbands device could be adjusted to here current pressure. She was informed that we would have to look at the device but it was a possibility. Patient will come to the [facility] [place] location to have her device evaluated. Front staff will add her to the tech schedule for 12/13/21 as a clinic visit.

## 2021-12-23 ENCOUNTER — OFFICE VISIT (OUTPATIENT)
Dept: PRIMARY CARE CLINIC | Age: 71
End: 2021-12-23
Payer: MEDICARE

## 2021-12-23 VITALS
BODY MASS INDEX: 40.02 KG/M2 | DIASTOLIC BLOOD PRESSURE: 69 MMHG | WEIGHT: 249 LBS | RESPIRATION RATE: 17 BRPM | OXYGEN SATURATION: 98 % | HEIGHT: 66 IN | SYSTOLIC BLOOD PRESSURE: 127 MMHG | TEMPERATURE: 97.5 F | HEART RATE: 60 BPM

## 2021-12-23 DIAGNOSIS — R35.0 URINARY FREQUENCY: Primary | ICD-10-CM

## 2021-12-23 DIAGNOSIS — R53.82 CHRONIC FATIGUE: ICD-10-CM

## 2021-12-23 DIAGNOSIS — E55.9 VITAMIN D DEFICIENCY: ICD-10-CM

## 2021-12-23 DIAGNOSIS — R42 DIZZINESS: ICD-10-CM

## 2021-12-23 DIAGNOSIS — G89.29 CHRONIC PAIN OF RIGHT KNEE: ICD-10-CM

## 2021-12-23 DIAGNOSIS — E66.01 OBESITY, CLASS III, BMI 40-49.9 (MORBID OBESITY) (HCC): ICD-10-CM

## 2021-12-23 DIAGNOSIS — Z13.220 ENCOUNTER FOR LIPID SCREENING FOR CARDIOVASCULAR DISEASE: ICD-10-CM

## 2021-12-23 DIAGNOSIS — M25.561 CHRONIC PAIN OF RIGHT KNEE: ICD-10-CM

## 2021-12-23 DIAGNOSIS — Z13.6 ENCOUNTER FOR LIPID SCREENING FOR CARDIOVASCULAR DISEASE: ICD-10-CM

## 2021-12-23 DIAGNOSIS — R74.01 ELEVATION OF LEVELS OF LIVER TRANSAMINASE LEVELS: ICD-10-CM

## 2021-12-23 DIAGNOSIS — L91.8 SKIN TAGS, MULTIPLE ACQUIRED: ICD-10-CM

## 2021-12-23 DIAGNOSIS — R73.03 PREDIABETES: ICD-10-CM

## 2021-12-23 PROCEDURE — G8399 PT W/DXA RESULTS DOCUMENT: HCPCS | Performed by: FAMILY MEDICINE

## 2021-12-23 PROCEDURE — G8536 NO DOC ELDER MAL SCRN: HCPCS | Performed by: FAMILY MEDICINE

## 2021-12-23 PROCEDURE — 1090F PRES/ABSN URINE INCON ASSESS: CPT | Performed by: FAMILY MEDICINE

## 2021-12-23 PROCEDURE — G8427 DOCREV CUR MEDS BY ELIG CLIN: HCPCS | Performed by: FAMILY MEDICINE

## 2021-12-23 PROCEDURE — 99214 OFFICE O/P EST MOD 30 MIN: CPT | Performed by: FAMILY MEDICINE

## 2021-12-23 PROCEDURE — 3017F COLORECTAL CA SCREEN DOC REV: CPT | Performed by: FAMILY MEDICINE

## 2021-12-23 PROCEDURE — G8432 DEP SCR NOT DOC, RNG: HCPCS | Performed by: FAMILY MEDICINE

## 2021-12-23 PROCEDURE — 1101F PT FALLS ASSESS-DOCD LE1/YR: CPT | Performed by: FAMILY MEDICINE

## 2021-12-23 PROCEDURE — G8417 CALC BMI ABV UP PARAM F/U: HCPCS | Performed by: FAMILY MEDICINE

## 2021-12-23 NOTE — PATIENT INSTRUCTIONS
Dizziness: Care Instructions  Your Care Instructions  Dizziness is the feeling of unsteadiness or fuzziness in your head. It is different than having vertigo, which is a feeling that the room is spinning or that you are moving or falling. It is also different from lightheadedness, which is the feeling that you are about to faint. It can be hard to know what causes dizziness. Some people feel dizzy when they have migraine headaches. Sometimes bouts of flu can make you feel dizzy. Some medical conditions, such as heart problems or high blood pressure, can make you feel dizzy. Many medicines can cause dizziness, including medicines for high blood pressure, pain, or anxiety. If a medicine causes your symptoms, your doctor may recommend that you stop or change the medicine. If it is a problem with your heart, you may need medicine to help your heart work better. If there is no clear reason for your symptoms, your doctor may suggest watching and waiting for a while to see if the dizziness goes away on its own. Follow-up care is a key part of your treatment and safety. Be sure to make and go to all appointments, and call your doctor if you are having problems. It's also a good idea to know your test results and keep a list of the medicines you take. How can you care for yourself at home? · If your doctor recommends or prescribes medicine, take it exactly as directed. Call your doctor if you think you are having a problem with your medicine. · Do not drive while you feel dizzy. · Try to prevent falls. Steps you can take include:  ? Using nonskid mats, adding grab bars near the tub, and using night-lights. ? Clearing your home so that walkways are free of anything you might trip on.  ? Letting family and friends know that you have been feeling dizzy. This will help them know how to help you. When should you call for help? Call 911 anytime you think you may need emergency care.  For example, call if:    · You passed out (lost consciousness).     · You have dizziness along with symptoms of a heart attack. These may include:  ? Chest pain or pressure, or a strange feeling in the chest.  ? Sweating. ? Shortness of breath. ? Nausea or vomiting. ? Pain, pressure, or a strange feeling in the back, neck, jaw, or upper belly or in one or both shoulders or arms. ? Lightheadedness or sudden weakness. ? A fast or irregular heartbeat.     · You have symptoms of a stroke. These may include:  ? Sudden numbness, tingling, weakness, or loss of movement in your face, arm, or leg, especially on only one side of your body. ? Sudden vision changes. ? Sudden trouble speaking. ? Sudden confusion or trouble understanding simple statements. ? Sudden problems with walking or balance. ? A sudden, severe headache that is different from past headaches. Call your doctor now or seek immediate medical care if:    · You feel dizzy and have a fever, headache, or ringing in your ears.     · You have new or increased nausea and vomiting.     · Your dizziness does not go away or comes back. Watch closely for changes in your health, and be sure to contact your doctor if:    · You do not get better as expected. Where can you learn more? Go to http://www.gray.com/  Enter Q823 in the search box to learn more about \"Dizziness: Care Instructions. \"  Current as of: July 1, 2021               Content Version: 13.0  © 3219-4374 [employer]. Care instructions adapted under license by [employer] (which disclaims liability or warranty for this information).  If you have questions about a medical condition or this instruction, always ask your healthcare professional. [place] [address] any warranty or liability for your use of this information.

## 2021-12-23 NOTE — PROGRESS NOTES
HPI     Chief Complaint   Patient presents with    Dizziness     states it could be due to adrenaline fatigue; states pulse gets \"way down\"    Urinary Frequency     states her blood work always shows that she is dehydrated    Other     unsteady gait, sometimes she gets off balance    Knee Pain     uses voltaren gel without relief; requests referral to orthopedic surgery    Skin Problem     states she has a skin tag on right side and she requests referral to dermatology    Other     requests referral to endocrinologist    Insomnia     doesn't sleep well     Labs     requests hba1c, CMP, vit D level, uric acid, iron panel labs today     She is a 66 y.o. female who presents for multiple concerns. Having increased urinary frequency. Denies dysuria. States water goes through today. States she sometimes has incontinence if she coughs or sneezes. Also has urge incontinence symptoms. States she did see a Uro gynecologist Dr. Nevin Stoddard who recommended creams and cranberry pills. States she gets unsteady at times. States she has ongoing light headedness. States when she turns she feels unstable and stumbles easily. She uses a cane at baseline. Has not had any falls. States dizziness has been ongoing since February. Had a CT head that was negative. States she does not want an MRI. States she has been told she has no cartilage in her R knee. States her L knee also hurts. Has been using Voltaren gel. Got Xrays from Ortho. Had a shot of Monovisc in June with Ortho and states her knee now burns. States she takes Aspirin for the pain. Was told she needs to lose 25 lbs to have knee surgery but has not done this. She was also referred to PT but hasn't been for the past 2 months. Is scheduled to go back to Ortho on Monday to look at it again. Has a skin tag on the R side that is loose and bothersome. Would like referral to Derm for this. Wants to see Endo for a \"full thyroid panel and more workup. \" States she is concerned she is not absorbing her thyroid hormones right. States she has chronic fatigue. Had a sleep study October 1st but she has not gotten her CPAP machine yet. Needs to follow up with her insurance. Review of Systems   Constitutional: Negative for chills and fever. Neurological: Positive for dizziness. Negative for facial asymmetry, speech difficulty and weakness. Reviewed PmHx, RxHx, FmHx, SocHx, AllgHx and updated and dated in the chart. Physical Exam:  Visit Vitals  /69 (BP 1 Location: Left upper arm, BP Patient Position: Sitting, BP Cuff Size: Adult long)   Pulse 60   Temp 97.5 °F (36.4 °C) (Temporal)   Resp 17   Ht 5' 5.5\" (1.664 m)   Wt 249 lb (112.9 kg)   SpO2 98%   BMI 40.81 kg/m²     Physical Exam  Vitals and nursing note reviewed. Constitutional:       General: She is not in acute distress. Appearance: Normal appearance. She is not ill-appearing. Cardiovascular:      Rate and Rhythm: Normal rate and regular rhythm. Heart sounds: No murmur heard. Pulmonary:      Effort: Pulmonary effort is normal. No respiratory distress. Breath sounds: Normal breath sounds. Skin:     General: Skin is warm and dry. Neurological:      Mental Status: She is alert. Mental status is at baseline. Comments: Neuro exam unremarkable except for again oscillating movement of eyes BL. Psychiatric:      Comments: Hard to keep focused. Had to redirect multiple times. No results found for this or any previous visit (from the past 12 hour(s)). Assessment / Plan     Diagnoses and all orders for this visit:    1. Urinary frequency  -     HEMOGLOBIN A1C WITH EAG; Future  -     URINALYSIS W/ RFLX MICROSCOPIC; Future  -     CULTURE, URINE; Future    2. Chronic fatigue  -     METABOLIC PANEL, COMPREHENSIVE; Future  -     TSH 3RD GENERATION; Future  -     T4, FREE; Future  -     T3 TOTAL;  Future  -     REFERRAL TO OBSTETRICS AND GYNECOLOGY  -     REFERRAL TO ENDOCRINOLOGY  -     IRON PROFILE; Future  -     CBC W/O DIFF; Future    3. Skin tags, multiple acquired  -     REFERRAL TO DERMATOLOGY    4. Vitamin D deficiency  -     VITAMIN D, 25 HYDROXY; Future    5. Dizziness  -     REFERRAL TO NEUROLOGY    6. Encounter for lipid screening for cardiovascular disease  -     LIPID PANEL; Future    7. Chronic pain of right knee  -     REFERRAL TO ORTHOPEDICS    8. Obesity, Class III, BMI 40-49.9 (morbid obesity) (HCC)    9. Elevation of levels of liver transaminase levels   -     IRON PROFILE; Future    10. Prediabetes   -     HEMOGLOBIN A1C WITH EAG; Future       She had multiple concerns today and we tried to cover as much as possible. Discussed if she had further concerns would need to follow up. I did offer to do additional imaging of her head for dizziness but she declined. Would like to follow up with Neuro first.   She would like to see Endo so they can also do \"more extensive thyroid lab workup\" then basic T3, T4 and TSH. I was not sure exactly what she was referring to when she meant this and she was not able to explain it to me further. Will do additional lab work up. Many of these are requested as she states she feels chronically fatigued. Recommend she follow up with Ortho about her knees and PT as recommended to also work on gait. I have discussed the diagnosis with the patient and the intended plan as seen in the above orders. The patient has received an after-visit summary and questions were answered concerning future plans. I have discussed medication side effects and warnings with the patient as well.     Twila Kohler, DO

## 2021-12-23 NOTE — PROGRESS NOTES
Identified pt with two pt identifiers(name and ). Chief Complaint   Patient presents with    Dizziness     states it could be due to adrenaline fatigue; states pulse gets \"way down\"    Urinary Frequency     states her blood work always shows that she is dehydrated    Other     unsteady gait, sometimes she gets off balance    Knee Pain     uses voltaren gel without relief; requests referral to orthopedic surgery    Skin Problem     states she has a skin tag on right side and she requests referral to dermatology    Other     requests referral to endocrinologist    Insomnia     doesn't sleep well     Labs     requests hba1c, CMP, vit D level, uric acid, iron panel labs today        3 most recent PHQ Screens 2021   Little interest or pleasure in doing things More than half the days   Feeling down, depressed, irritable, or hopeless Not at all   Total Score PHQ 2 2        Vitals:    21 1222   BP: 127/69   Pulse: 60   Resp: 17   Temp: 97.5 °F (36.4 °C)   TempSrc: Temporal   SpO2: 98%   Weight: 249 lb (112.9 kg)   Height: 5' 5.5\" (1.664 m)   PainSc:   7   PainLoc: Knee       Health Maintenance Due   Topic    Hepatitis C Screening     COVID-19 Vaccine (1)    DTaP/Tdap/Td series (1 - Tdap)    Colorectal Cancer Screening Combo     Shingrix Vaccine Age 50> (1 of 2)    Pneumococcal 65+ years (1 of 1 - PPSV23)    Medicare Yearly Exam     Breast Cancer Screen Mammogram     Flu Vaccine (1)       1. Have you been to the ER, urgent care clinic since your last visit? Hospitalized since your last visit? No    2. Have you seen or consulted any other health care providers outside of the [address] [place] [address] since your last visit? Include any pap smears or colon screening.  No

## 2021-12-28 NOTE — PROGRESS NOTES
Spoke with patient about labs. Discussed recommend Lipitor. Wants more info and to think about this. She is on thyroid regimen Tirosint 50 in AM with Guide Rock 35 and then Guide Rock 20 in afternoon. Will reach out to Endo for suggestions on how to adjust with her TSH until she gets in. Discussed A1c is actually in NORMAL range.

## 2022-01-04 RX ORDER — LEVOTHYROXINE SODIUM 75 UG/1
75 CAPSULE ORAL DAILY
Qty: 90 CAPSULE | Refills: 0 | Status: SHIPPED | OUTPATIENT
Start: 2022-01-04 | End: 2022-04-04 | Stop reason: SDUPTHER

## 2022-01-27 DIAGNOSIS — M25.561 RIGHT KNEE PAIN, UNSPECIFIED CHRONICITY: ICD-10-CM

## 2022-01-27 DIAGNOSIS — M25.562 LEFT KNEE PAIN, UNSPECIFIED CHRONICITY: Primary | ICD-10-CM

## 2022-02-02 ENCOUNTER — OFFICE VISIT (OUTPATIENT)
Dept: ORTHOPEDIC SURGERY | Age: 72
End: 2022-02-02
Payer: MEDICARE

## 2022-02-02 ENCOUNTER — HOSPITAL ENCOUNTER (OUTPATIENT)
Dept: GENERAL RADIOLOGY | Age: 72
Discharge: HOME | End: 2022-02-02
Payer: MEDICARE

## 2022-02-02 VITALS
WEIGHT: 253 LBS | HEART RATE: 71 BPM | OXYGEN SATURATION: 98 % | BODY MASS INDEX: 40.66 KG/M2 | HEIGHT: 66 IN | TEMPERATURE: 97.3 F | SYSTOLIC BLOOD PRESSURE: 152 MMHG | DIASTOLIC BLOOD PRESSURE: 78 MMHG

## 2022-02-02 DIAGNOSIS — E66.01 MORBID OBESITY (HCC): Primary | ICD-10-CM

## 2022-02-02 DIAGNOSIS — M25.561 RIGHT KNEE PAIN, UNSPECIFIED CHRONICITY: ICD-10-CM

## 2022-02-02 DIAGNOSIS — M25.562 LEFT KNEE PAIN, UNSPECIFIED CHRONICITY: ICD-10-CM

## 2022-02-02 DIAGNOSIS — M17.0 BILATERAL PRIMARY OSTEOARTHRITIS OF KNEE: ICD-10-CM

## 2022-02-02 PROCEDURE — 73564 X-RAY EXAM KNEE 4 OR MORE: CPT

## 2022-02-02 PROCEDURE — G8399 PT W/DXA RESULTS DOCUMENT: HCPCS | Performed by: ORTHOPAEDIC SURGERY

## 2022-02-02 PROCEDURE — 1090F PRES/ABSN URINE INCON ASSESS: CPT | Performed by: ORTHOPAEDIC SURGERY

## 2022-02-02 PROCEDURE — G8417 CALC BMI ABV UP PARAM F/U: HCPCS | Performed by: ORTHOPAEDIC SURGERY

## 2022-02-02 PROCEDURE — G8510 SCR DEP NEG, NO PLAN REQD: HCPCS | Performed by: ORTHOPAEDIC SURGERY

## 2022-02-02 PROCEDURE — 3017F COLORECTAL CA SCREEN DOC REV: CPT | Performed by: ORTHOPAEDIC SURGERY

## 2022-02-02 PROCEDURE — G8536 NO DOC ELDER MAL SCRN: HCPCS | Performed by: ORTHOPAEDIC SURGERY

## 2022-02-02 PROCEDURE — 99203 OFFICE O/P NEW LOW 30 MIN: CPT | Performed by: ORTHOPAEDIC SURGERY

## 2022-02-02 PROCEDURE — G8427 DOCREV CUR MEDS BY ELIG CLIN: HCPCS | Performed by: ORTHOPAEDIC SURGERY

## 2022-02-02 PROCEDURE — 1101F PT FALLS ASSESS-DOCD LE1/YR: CPT | Performed by: ORTHOPAEDIC SURGERY

## 2022-02-02 RX ORDER — DICLOFENAC SODIUM 50 MG/1
50 TABLET, DELAYED RELEASE ORAL 3 TIMES DAILY
Qty: 60 TABLET | Refills: 1 | Status: SHIPPED | OUTPATIENT
Start: 2022-02-02 | End: 2022-03-17 | Stop reason: ALTCHOICE

## 2022-02-02 NOTE — LETTER
2/2/2022    Patient: Haritha Das   YOB: 1950   Date of Visit: 2/2/2022     Jessenia Beaulieu [facility] [address]    Dear Constanza Strickland DO,      Thank you for referring Ms. Kailee Lim to [facility] for evaluation. My notes for this consultation are attached. If you have questions, please do not hesitate to call me. I look forward to following your patient along with you.       Sincerely,    Abi Stahl DO

## 2022-02-02 NOTE — PROGRESS NOTES
Identified pt with two pt identifiers (name and ). Reviewed chart in preparation for visit and have obtained necessary documentation. Quintin Reddy is a 66 y.o. female  Chief Complaint   Patient presents with    Knee Pain     Bilateral knee     Visit Vitals  BP (!) 152/78 (BP 1 Location: Left upper arm, BP Patient Position: Sitting, BP Cuff Size: Large adult)   Pulse 71   Temp 97.3 °F (36.3 °C) (Tympanic)   Ht 5' 5.5\" (1.664 m)   Wt 253 lb (114.8 kg)   SpO2 98%   BMI 41.46 kg/m²     1. Have you been to the ER, urgent care clinic since your last visit? Hospitalized since your last visit? No    2. Have you seen or consulted any other health care providers outside of the [address] [place] [address] since your last visit? Include any pap smears or colon screening.  No

## 2022-02-03 NOTE — PROGRESS NOTES
2/2/2022    Chief Complaint: Left knee pain    Assessment: Osteoarthritis left knee    Plan: This patient and I did discuss the many options in treating knee osteoarthritis. We did discuss that we could continue to seek out nonoperative modalities, such as: NSAIDs, oral and topical analgesics, knee injections, knee braces, physical therapy, stretching, strengthening, and weight loss strategies, activity modification, ambulatory assistive devices. This patient and I went over a thorough discussion regarding weight and how that plays into surgical risk. This is a modifiable risk factor, we went over the danger curve for infection after surgery if a patient's body mass index is over 40, I went into the logistics of that, we also discussed significant strategies, dietitian, exercises that do not involve the lower extremity, offloading with nonweightbearing exercises. Once the body mass index is below 40, this can become a surgical solution if necessary and all other modalities have failed. A significant portion of discussion was centered around this need. The patient stated their understanding with this, and would like to proceed with nonsurgical management in the form of dietician, 20 lbs weight loss, and surgery after. Follow up 3 weeks. HPI: This is a 66 y.o. female who complains of left knee pain. Onset was gradual.  The patient has had activity dependent pain for years. The patient has tried activity modification, physical therapy exercises, injections have not worked for her. The pain is in the medial knees, it is severe in intensity. The patient feels unstable with the knee, fears falling, and has significant limitation with activities of daily living, recreation, and walks with a cane.     Past Medical History:   Diagnosis Date    Chest pain, unspecified     Chronic fatigue syndrome 3/16/2012    Fibromyalgia     Impaired glucose tolerance     Other dyspnea and respiratory abnormality     Palpitations     Shortness of breath     Skin cancer 3/16/2012    Unspecified hypothyroidism     goiter    Unspecified vitamin D deficiency        Past Surgical History:   Procedure Laterality Date    HX BREAST LUMPECTOMY  7/2013    right breast    HX TONSILLECTOMY      HX TONSILLECTOMY      HX UROLOGICAL      urethral opening was widened       Current Outpatient Medications on File Prior to Visit   Medication Sig Dispense Refill    Levothyroxine (Tirosint) 75 mcg cap Take 75 mcg by mouth daily. 90 Capsule 0    tolnaftate (TINACTIN) 1 % external solution Apply  to affected area two (2) times a day.  [place] Thyroid 15 mg tablet Take 15 mg by mouth daily.  lysine (L-LYSINE) 500 mg tab tablet lysine 500 mg tablet   Take by oral route.  OTHER Take 70 mg by mouth daily. Indications: Progestrone      cholecalciferol, VITAMIN D3, (VITAMIN D3) 5,000 unit tab tablet 08336 iu (in drop form), K2      MAGNESIUM GLYCINATE, BULK, by Does Not Apply route.  diclofenac (VOLTAREN) 1 % gel Apply  to affected area two (2) times a day. (Patient not taking: Reported on 2/2/2022)      glucose blood VI test strips (ONETOUCH VERIO) strip Check blood sugar once daily. DX: R73.72 (Patient not taking: Reported on 12/7/2021) 100 Strip 3    lancets (ONE TOUCH DELICA) 33 gauge misc Check blood sugar once daily. DX: R73.72 (Patient not taking: Reported on 12/7/2021) 100 Lancet 3     No current facility-administered medications on file prior to visit. Allergies   Allergen Reactions    Lidocaine Other (comments)     Body Shakes    Adhes.  Band-Tape-Benzalkonium Swelling    Adhesive Swelling    Dairy Aid [Lactase] Unknown (comments)    Gluten Diarrhea     \"brain fog\", aching    Iodinated Contrast Media Other (comments)     Gets dizzy and shakey    Milk Containing Products Other (comments)     Aches and stomach cramps    Motrin [Ibuprofen] Unknown (comments)    Other Medication Other (comments)     Super sensitive to all medication    Soy Unknown (comments)    Sulfa (Sulfonamide Antibiotics) Unable to Obtain    Trulicity [Dulaglutide] Nausea Only     Stomach discomfort    Wheat Unknown (comments)       Family History   Problem Relation Age of Onset    Coronary Art Dis Mother     Thyroid Disease Mother     Diabetes Mother     Cancer Mother         Multiple Myeloma    Cancer Father         Colon cancer    OSTEOARTHRITIS Father     Heart Disease Father     High Cholesterol Brother     Thyroid Disease Brother         thyroid cancer    Cancer Brother         Thyroid and Skin    Diabetes Maternal Aunt     Hypertension Other     Heart Disease Other     Depression Other     Anxiety Other     Cancer Sister         Skin    Cancer Brother         Multiple Myeloma and skin    Cancer Brother         Skin       Social History     Socioeconomic History    Marital status:    Tobacco Use    Smoking status: Never Smoker    Smokeless tobacco: Never Used   Vaping Use    Vaping Use: Never used   Substance and Sexual Activity    Alcohol use: Yes     Alcohol/week: 1.0 standard drink     Types: 1 Glasses of wine per week     Comment: Rare 0.5 glass of wine    Drug use: No    Sexual activity: Yes     Partners: Male   Social History Narrative    Lives in [facility] with . Used to work as a  for [employer].          Review of Systems:       General: Denies headache, lethargy, fever, weight loss  Ears/Nose/Throat: Denies ear discharge, drainage, nosebleeds, hoarse voice, dental problems  Cardiovascular: Denies chest pain, shortness of breath  Lungs: Denies chest pain, breathing problems, wheezing, pneumonia  Stomach: Denies stomach pain, heartburn, constipation, irritable bowel  Skin: Denies rash, sores, open wounds  Musculoskeletal: Admits to knee pain  Genitourinary: Denies dysuria, hematuria, polyuria  Gastrointestinal: Denies constipation, obstipation, diarrhea  Neurological: Denies changes in sight, smell, hearing, taste, seizures. Denies loss of consciousness. Psychiatric: Denies depression, sleep pattern changes, anxiety, change in personality  Endocrine: Denies mood swings, heat or cold intolerance  Hematologic/Lymphatic: Denies anemia, purpura, petechia  Allergic/Immunologic: Denies swelling of throat, pain or swelling at lymph nodes      Physical Examination:    Visit Vitals  BP (!) 152/78 (BP 1 Location: Left upper arm, BP Patient Position: Sitting, BP Cuff Size: Large adult)   Pulse 71   Temp 97.3 °F (36.3 °C) (Tympanic)   Ht 5' 5.5\" (1.664 m)   Wt 253 lb (114.8 kg)   SpO2 98%   BMI 41.46 kg/m²        General: AOX3, no apparent distress  Psychiatric: mood and affect appropriate  Lungs: breathing is symmetric and unlabored bilaterally  Heart: regular rate and rhythm  Abdomen: no guarding  Head: normocephalic, atraumatic  Skin: No significant abnormalities, good turgor  Sensation intact to light touch: L1-S1 dermatomes  Muscular exam: 5/5 strength in all major muscle groups unless noted in specialty exam.    Extremities:      Left upper extremity: Full active and passive range of motion without pain, deformity, no open wound, strength 5/5 in all major muscle groups. Right upper extremity: Full active and passive range of motion without pain, deformity, no open wound, strength 5/5 in all major muscle groups. Right lower extremity: Full active and passive range of motion without pain, deformity, no open wound, strength 5/5 in all major muscle groups. Left lower extremity:  Varus deformity is noted. Range of motion of the knee is 0-110. Ligamentous testing of the knee indicates stability of the the MCL, LCL, PCL, and ACL. Lachman's, anterior and posterior drawer tests are specifically negative. Medial joint line tenderness to palpation is noted. Popliteal area is unremarkable. 1+  for effusion. No patellar crepitus. Patella tracks centrally . Pivot shift is negative. Strength testing is indicative of 5/5 strength at hip flexion, extension, knee flexion and extension, tibialis anterior, EHL, and FHL. Sensation is intact to light touch in the L1-S1 dermatomes. Capillary refill is less than 2 seconds in the toes. Diagnostics:    Pertinent Diagnostics:  Xrays are available of the left knee, they indicate severe bilateral osteoarthritis of the knee joint, no significant other findings, no other osseus abnormalities, fractures, or dislocations. Procedures: none    Ms. Tamela Rosa has a reminder for a \"due or due soon\" health maintenance. I have asked that she contact her primary care provider for follow-up on this health maintenance.

## 2022-02-10 LAB — MAMMOGRAPHY, EXTERNAL: NORMAL

## 2022-02-28 ENCOUNTER — OFFICE VISIT (OUTPATIENT)
Dept: NEUROLOGY | Age: 72
End: 2022-02-28
Payer: MEDICARE

## 2022-02-28 VITALS
HEIGHT: 66 IN | DIASTOLIC BLOOD PRESSURE: 78 MMHG | SYSTOLIC BLOOD PRESSURE: 130 MMHG | WEIGHT: 247 LBS | HEART RATE: 68 BPM | OXYGEN SATURATION: 98 % | BODY MASS INDEX: 39.7 KG/M2 | RESPIRATION RATE: 16 BRPM

## 2022-02-28 DIAGNOSIS — R42 VERTIGO: Primary | ICD-10-CM

## 2022-02-28 PROCEDURE — G8510 SCR DEP NEG, NO PLAN REQD: HCPCS | Performed by: PSYCHIATRY & NEUROLOGY

## 2022-02-28 PROCEDURE — 1090F PRES/ABSN URINE INCON ASSESS: CPT | Performed by: PSYCHIATRY & NEUROLOGY

## 2022-02-28 PROCEDURE — 3017F COLORECTAL CA SCREEN DOC REV: CPT | Performed by: PSYCHIATRY & NEUROLOGY

## 2022-02-28 PROCEDURE — G8399 PT W/DXA RESULTS DOCUMENT: HCPCS | Performed by: PSYCHIATRY & NEUROLOGY

## 2022-02-28 PROCEDURE — G8427 DOCREV CUR MEDS BY ELIG CLIN: HCPCS | Performed by: PSYCHIATRY & NEUROLOGY

## 2022-02-28 PROCEDURE — 1101F PT FALLS ASSESS-DOCD LE1/YR: CPT | Performed by: PSYCHIATRY & NEUROLOGY

## 2022-02-28 PROCEDURE — 99204 OFFICE O/P NEW MOD 45 MIN: CPT | Performed by: PSYCHIATRY & NEUROLOGY

## 2022-02-28 PROCEDURE — G8417 CALC BMI ABV UP PARAM F/U: HCPCS | Performed by: PSYCHIATRY & NEUROLOGY

## 2022-02-28 PROCEDURE — G8536 NO DOC ELDER MAL SCRN: HCPCS | Performed by: PSYCHIATRY & NEUROLOGY

## 2022-02-28 RX ORDER — AMLODIPINE BESYLATE 5 MG/1
2.5 TABLET ORAL DAILY
COMMUNITY
Start: 2021-11-30 | End: 2022-03-17 | Stop reason: ALTCHOICE

## 2022-02-28 RX ORDER — ATORVASTATIN CALCIUM 10 MG/1
10 TABLET, FILM COATED ORAL DAILY
COMMUNITY
Start: 2021-10-19 | End: 2022-03-17 | Stop reason: ALTCHOICE

## 2022-02-28 RX ORDER — ESTRADIOL 0.1 MG/G
CREAM VAGINAL
COMMUNITY
Start: 2021-12-13 | End: 2022-03-17 | Stop reason: ALTCHOICE

## 2022-02-28 RX ORDER — PHENAZOPYRIDINE HYDROCHLORIDE 200 MG/1
TABLET, FILM COATED ORAL
COMMUNITY
End: 2022-03-17 | Stop reason: ALTCHOICE

## 2022-02-28 RX ORDER — OMEPRAZOLE 20 MG/1
CAPSULE, DELAYED RELEASE ORAL
COMMUNITY
End: 2022-03-17 | Stop reason: ALTCHOICE

## 2022-02-28 RX ORDER — PANTOPRAZOLE SODIUM 20 MG/1
TABLET, DELAYED RELEASE ORAL
COMMUNITY
Start: 2021-12-07 | End: 2022-03-17 | Stop reason: ALTCHOICE

## 2022-02-28 RX ORDER — ZINC GLUCONATE 50 MG
50 TABLET ORAL DAILY
COMMUNITY
End: 2022-06-12

## 2022-02-28 RX ORDER — FAMOTIDINE 40 MG/1
TABLET, FILM COATED ORAL
COMMUNITY
Start: 2021-12-07 | End: 2022-03-17 | Stop reason: ALTCHOICE

## 2022-02-28 RX ORDER — UREA 10 %
LOTION (ML) TOPICAL
COMMUNITY
End: 2022-08-03

## 2022-02-28 NOTE — LETTER
2/28/2022    Patient: Haritha Das   YOB: 1950   Date of Visit: 2/28/2022     Constanza Strickland, [address] [facility] [address]  [place] [place]    Dear Constanza Strickland DO,      Thank you for referring Ms. Kailee Lim to [address] [place] [address] for evaluation. My notes for this consultation are attached. If you have questions, please do not hesitate to call me. I look forward to following your patient along with you.       Sincerely,    Víctor Rico MD

## 2022-02-28 NOTE — PROGRESS NOTES
Neurology Consult Note      HISTORY PROVIDED BY: patient    Chief Complaint:   Chief Complaint   Patient presents with    Headache     Right eye is twitching/ eat get a headache and get lightheaded       Subjective:    Kaci George is a 66 y.o. right handed female who presents in consultation for dizziness with head pain, balance issues. She was previously seen by Dr. Ruth Iverson for these same issues, last visit 1/11/19, MRI brain 2/27/19 was normal. She feels like her sxs are a lot worse then they ever were, including losing balance, trouble communicating, headache, persistent since Jan, 2021 with a UTI and taking a medication that she was \"very allergic\" to. When she gets up from the bed she feels like she going to pass out, but also describes vertigo type dizziness. Mentions feeling like she might pass out after standing up in the bathroom, but had to hold on to the stall walls to keep from falling over. Mentions HA and then feeling dizzy like she is drunk. Has h/o vertigo from inner ear issue in past and had what sounds like VT. She is taking pseudofed in AM which helps her focus. PCP ordered [facility] 12/8/21 that is normal. States she is drinking 3 x 21oz bottles of water a day, but is often told she is dehydrated based on labs. In review of EMR, had MRI brain wo contrast 3/24/15 for vertigo and HAs - normal study.      Past Medical History:   Diagnosis Date    BPPV (benign paroxysmal positional vertigo)     Chronic fatigue syndrome 3/16/2012    Fibromyalgia     BRAVO on CPAP     BiPAP    Other dyspnea and respiratory abnormality     Palpitations     Prediabetes     Skin cancer 3/16/2012    Unspecified hypothyroidism     goiter    Vitamin D deficiency       Past Surgical History:   Procedure Laterality Date    HX BREAST LUMPECTOMY  7/2013    right breast    HX TONSILLECTOMY      HX UROLOGICAL      urethral opening was widened      Social History     Socioeconomic History    Marital status:  Spouse name: Not on file    Number of children: Not on file    Years of education: Not on file    Highest education level: Not on file   Occupational History    Not on file   Tobacco Use    Smoking status: Never Smoker    Smokeless tobacco: Never Used   Vaping Use    Vaping Use: Never used   Substance and Sexual Activity    Alcohol use: Yes     Alcohol/week: 1.0 standard drink     Types: 1 Glasses of wine per week     Comment: Rare 0.5 glass of wine    Drug use: No    Sexual activity: Yes     Partners: Male   Other Topics Concern    Not on file   Social History Narrative    Lives in [facility] with . Used to work as a  for [employer]. Social Determinants of Health     Financial Resource Strain:     Difficulty of Paying Living Expenses: Not on file   Food Insecurity:     Worried About Running Out of Food in the Last Year: Not on file    Jeff of Food in the Last Year: Not on file   Transportation Needs:     Lack of Transportation (Medical): Not on file    Lack of Transportation (Non-Medical):  Not on file   Physical Activity:     Days of Exercise per Week: Not on file    Minutes of Exercise per Session: Not on file   Stress:     Feeling of Stress : Not on file   Social Connections:     Frequency of Communication with Friends and Family: Not on file    Frequency of Social Gatherings with Friends and Family: Not on file    Attends Catholic Services: Not on file    Active Member of [address] [place] [address] or Organizations: Not on file    Attends Club or Organization Meetings: Not on file    Marital Status: Not on file   Intimate Partner Violence:     Fear of Current or Ex-Partner: Not on file    Emotionally Abused: Not on file    Physically Abused: Not on file    Sexually Abused: Not on file   Housing Stability:     Unable to Pay for Housing in the Last Year: Not on file    Number of [place] in the Last Year: Not on file    Unstable Housing in the Last Year: Not on file Family History   Problem Relation Age of Onset    Coronary Art Dis Mother     Thyroid Disease Mother     Diabetes Mother     Cancer Mother         Multiple Myeloma    Cancer Father         Colon cancer    OSTEOARTHRITIS Father     Heart Disease Father     High Cholesterol Brother     Thyroid Disease Brother         thyroid cancer    Cancer Brother         Thyroid and Skin    Diabetes Maternal Aunt     Hypertension Other     Heart Disease Other     Depression Other     Anxiety Other     Cancer Sister         Skin    Cancer Brother         Multiple Myeloma and skin    Cancer Brother         Skin         Objective:   Review of Systems   Constitutional: Positive for malaise/fatigue. HENT: Positive for hearing loss. Eyes: Positive for blurred vision. Respiratory: Positive for shortness of breath. Snoring   Cardiovascular: Positive for palpitations. Gastrointestinal: Positive for abdominal pain, constipation and diarrhea. Neurological: Positive for dizziness, sensory change and headaches. Psychiatric/Behavioral: The patient is nervous/anxious. All other systems reviewed and are negative. Allergies   Allergen Reactions    Lidocaine Other (comments)     Body Shakes    Adhes.  Band-Tape-Benzalkonium Swelling    Adhesive Swelling    Dairy Aid [Lactase] Unknown (comments)    Gluten Diarrhea     \"brain fog\", aching    Iodinated Contrast Media Other (comments)     Gets dizzy and shakey    Milk Containing Products Other (comments)     Aches and stomach cramps    Motrin [Ibuprofen] Unknown (comments)    Other Medication Other (comments)     Super sensitive to all medication    Soy Unknown (comments)    Sulfa (Sulfonamide Antibiotics) Unable to Obtain    Trulicity [Dulaglutide] Nausea Only     Stomach discomfort    Wheat Unknown (comments)        Meds:  Outpatient Medications Prior to Visit   Medication Sig Dispense Refill    diclofenac EC (VOLTAREN) 50 mg EC tablet Take 1 Tablet by mouth three (3) times daily. 60 Tablet 1    Levothyroxine (Tirosint) 75 mcg cap Take 75 mcg by mouth daily. 90 Capsule 0    diclofenac (VOLTAREN) 1 % gel Apply  to affected area two (2) times a day. (Patient not taking: Reported on 2/2/2022)      tolnaftate (TINACTIN) 1 % external solution Apply  to affected area two (2) times a day.  [place] Thyroid 15 mg tablet Take 15 mg by mouth daily.  lysine (L-LYSINE) 500 mg tab tablet lysine 500 mg tablet   Take by oral route.  glucose blood VI test strips (ONETOUCH VERIO) strip Check blood sugar once daily. DX: R73.72 (Patient not taking: Reported on 12/7/2021) 100 Strip 3    lancets (ONE TOUCH DELICA) 33 gauge misc Check blood sugar once daily. DX: R73.72 (Patient not taking: Reported on 12/7/2021) 100 Lancet 3    OTHER Take 70 mg by mouth daily. Indications: Progestrone      cholecalciferol, VITAMIN D3, (VITAMIN D3) 5,000 unit tab tablet 65350 iu (in drop form), K2      MAGNESIUM GLYCINATE, BULK, by Does Not Apply route. No facility-administered medications prior to visit. Imaging:  MRI Results (most recent):  Results from Hospital Encounter encounter on 02/27/19    MRI BRAIN WO CONT    Narrative  HISTORY:  Memory loss and worsening headache    COMPARISON:  3/24/2015    TECHNIQUE:  MR imaging of the brain was performed with sagittal T1, axial T1,  T2, FLAIR, GRE, DWI/ADC, coronal T2.    FINDINGS:    The ventricles and cisterns are of normal size and configuration. There are no  extra-axial fluid collections, mass lesions or mass effect. There is no  significant white matter disease. There is no acute infarction. There is no  acute or chronic intracranial hemorrhage. The major intracranial vascular  flow-voids are patent. Marrow signal is normal.    Impression  IMPRESSION:  1. No change.  No acute intracranial abnormality     CT Results (most recent):  Results from Hospital Encounter encounter on 12/08/21    CT HEAD WO CONT    Narrative  EXAM: CT HEAD WO CONT    INDICATION: Abnormal eye movements    COMPARISON: None. CONTRAST: None. TECHNIQUE: Unenhanced CT of the head was performed using 5 mm images. Brain and  bone windows were generated. Coronal and sagittal reformats. CT dose reduction  was achieved through use of a standardized protocol tailored for this  examination and automatic exposure control for dose modulation. FINDINGS:  The ventricles and sulci are normal in size, shape and configuration. . There is  no significant white matter disease. There is no intracranial hemorrhage,  extra-axial collection, or mass effect. The basilar cisterns are open. No CT  evidence of acute infarct. The bone windows demonstrate no abnormalities. The visualized portions of the  paranasal sinuses and mastoid air cells are clear. Impression  No acute intracranial abnormality.        Reviewed records in [facility] today    Lab Review   Results for orders placed or performed in visit on 12/23/21   CULTURE, URINE    Specimen: Urine   Result Value Ref Range    Special Requests: NO SPECIAL REQUESTS      Culture result: No growth (<1,000 CFU/ML)     URINALYSIS W/ RFLX MICROSCOPIC   Result Value Ref Range    Color YELLOW/STRAW      Appearance CLEAR CLEAR      Specific gravity 1.010 1.003 - 1.030      pH (UA) 5.5 5.0 - 8.0      Protein Negative Negative mg/dL    Glucose Negative Negative mg/dL    Ketone Negative Negative mg/dL    Bilirubin Negative Negative      Blood Negative Negative      Urobilinogen 0.2 0.2 - 1.0 EU/dL    Nitrites Negative Negative      Leukocyte Esterase Negative Negative     LIPID PANEL   Result Value Ref Range    Cholesterol, total 253 (H) <200 MG/DL    Triglyceride 217 (H) <150 MG/DL    HDL Cholesterol 45 MG/DL    LDL, calculated 164.6 (H) 0 - 100 MG/DL    VLDL, calculated 43.4 MG/DL    CHOL/HDL Ratio 5.6 (H) 0.0 - 5.0     CBC W/O DIFF   Result Value Ref Range    WBC 4.8 3.6 - 11.0 K/uL    RBC 4.69 3.80 - 5.20 M/uL    HGB 14.3 11.5 - 16.0 g/dL    HCT 45.2 35.0 - 47.0 %    MCV 96.4 80.0 - 99.0 FL    MCH 30.5 26.0 - 34.0 PG    MCHC 31.6 30.0 - 36.5 g/dL    RDW 12.6 11.5 - 14.5 %    PLATELET 193 600 - 887 K/uL    MPV 10.0 8.9 - 12.9 FL    NRBC 0.0 0  WBC    ABSOLUTE NRBC 0.00 0.00 - 0.01 K/uL   HEMOGLOBIN A1C WITH EAG   Result Value Ref Range    Hemoglobin A1c 5.6 4.0 - 5.6 %    Est. average glucose 114 mg/dL   IRON PROFILE   Result Value Ref Range    Iron 106 35 - 150 ug/dL    TIBC 371 250 - 450 ug/dL    Iron % saturation 29 20 - 50 %   T3 TOTAL   Result Value Ref Range    T3, total 133 71 - 180 ng/dL   T4, FREE   Result Value Ref Range    T4, Free 0.8 0.8 - 1.5 NG/DL   TSH 3RD GENERATION   Result Value Ref Range    TSH 8.75 (H) 0.36 - 3.74 uIU/mL   VITAMIN D, 25 HYDROXY   Result Value Ref Range    Vitamin D 25-Hydroxy 47.1 30 - 993 ng/mL   METABOLIC PANEL, COMPREHENSIVE   Result Value Ref Range    Sodium 138 136 - 145 mmol/L    Potassium 4.2 3.5 - 5.1 mmol/L    Chloride 107 97 - 108 mmol/L    CO2 27 21 - 32 mmol/L    Anion gap 4 (L) 5 - 15 mmol/L    Glucose 100 65 - 100 mg/dL    BUN 18 6 - 20 MG/DL    Creatinine 0.72 0.55 - 1.02 MG/DL    BUN/Creatinine ratio 25 (H) 12 - 20      GFR est AA >60 >60 ml/min/1.73m2    GFR est non-AA >60 >60 ml/min/1.73m2    Calcium 9.7 8.5 - 10.1 MG/DL    Bilirubin, total 0.5 0.2 - 1.0 MG/DL    ALT (SGPT) 70 12 - 78 U/L    AST (SGOT) 36 15 - 37 U/L    Alk. phosphatase 71 45 - 117 U/L    Protein, total 7.5 6.4 - 8.2 g/dL    Albumin 4.2 3.5 - 5.0 g/dL    Globulin 3.3 2.0 - 4.0 g/dL    A-G Ratio 1.3 1.1 - 2.2     SAMPLES BEING HELD   Result Value Ref Range    SAMPLES BEING HELD 1 sst     COMMENT        Add-on orders for these samples will be processed based on acceptable specimen integrity and analyte stability, which may vary by analyte.         Exam:  Visit Vitals  /78   Pulse 68   Resp 16   Ht 5' 5.5\" (1.664 m)   Wt 247 lb (112 kg)   SpO2 98%   BMI 40.48 kg/m² General:  Alert, cooperative, no distress. Head:  Normocephalic, without obvious abnormality, atraumatic. Respiratory:  Heart:   Non labored breathing  Regular rate and rhythm, no murmurs   Neck:   2+ carotids, no bruits   Extremities: Warm, no cyanosis or edema. Pulses: 2+ radial pulses. Neurologic:  MS: Alert and oriented x 4, speech intact. Tangential historian, pressured speech, talking throughout visit, frequently interrupting, occasional word finding. Language intact. Attention and fund of knowledge appropriate. Recent and remote memory intact. Cranial Nerves:  II: visual fields Full to confrontation   II: pupils Equal, round, reactive to light   II: optic disc    III,VII: ptosis none   III,IV,VI: extraocular muscles  EOMI, no nystagmus or diplopia   V: facial light touch sensation  normal   VII: facial muscle function   symmetric   VIII: hearing intact   IX: soft palate elevation  normal   XI: trapezius strength  5/5   XI: sternocleidomastoid strength 5/5   XII: tongue  Midline     Motor: normal bulk and tone, no tremor              Strength: 5/5 throughout, no PD  Sensory: intact to LT, PP  Coordination: FTN and HTS intact, GUILHERME intact  Gait: normal gait, with first step tandem walking she demonstrated excellent balance to stay upright, then was able to tandem walk  Reflexes: 2+ symmetric, toes downgoing           Assessment/Plan   Pt is a 66 y.o. right handed female with c/o dizziness with head pain, balance issues, trouble with focusing/communication, previously seen by Dr. Fredy Foster for these same issues, last visit 1/11/19, now persistent since January, 2021 and attributes this to taking a medication for UTI that she was allergic to. Intermingles lightheadedness and vertigo type dizziness. Has h/o vertigo from inner ear issue in past and had VT.   [facility] 12/8/21 that is normal. Exam with BMI 41, MP class IV posterior OP, tangential, frequently interrupting, talking near continuously through the visit, o/w non-focal.  Suspect this is recurrent vertigo, possibly migraine associated vertigo as well, cannot fully exclude component of orthostatic hypotension contributing to some of her symptoms. Additionally patient is on 22 supplements, so difficult to know if perhaps her symptoms are side effects of these supplements or the interaction between the several months. She has 13 allergies listed, I am certainly hesitant to start her on a migraine headache prevention medication given her difficulties with allergies and the 22 supplements she is taking that I cannot cross check with an FDA approved migraine headache prevention medication. Suspect patient's difficulties with concentration are due ADHD and not surprising this might be more difficult when she is having dizziness and headache. At the end of the visit she asked for an antianxiety medication since her  is about to leave the country and she is concerned she will be able to function alone. I suggested she call her PCP to discuss an anxiolytic. Referral was placed to vestibular therapy at [address] [place] [address] in [facility]. Follow-up as needed in neurology. ICD-10-CM ICD-9-CM    1. Vertigo  R42 780.4 REFERRAL TO PHYSICAL THERAPY       Signed:   Víctor Rico MD  2/28/2022

## 2022-02-28 NOTE — PROGRESS NOTES
Chief Complaint   Patient presents with    Headache     Right eye is twitching/ eat get a headache and get lightheaded      Visit Vitals  /78   Pulse 68   Resp 16   Ht 5' 5.5\" (1.664 m)   Wt 247 lb (112 kg)   SpO2 98%   BMI 40.48 kg/m²

## 2022-03-09 ENCOUNTER — TELEPHONE (OUTPATIENT)
Dept: ORTHOPEDIC SURGERY | Age: 72
End: 2022-03-09

## 2022-03-11 ENCOUNTER — TELEPHONE (OUTPATIENT)
Dept: ORTHOPEDIC SURGERY | Age: 72
End: 2022-03-11

## 2022-03-11 NOTE — TELEPHONE ENCOUNTER
PSR called     [address]    [phone]    And LVM requesting a call back to schedule the PT after the PT called and stated she had not heard back from the dietitian after multiple attempts.

## 2022-03-14 ENCOUNTER — NURSE TRIAGE (OUTPATIENT)
Dept: OTHER | Facility: CLINIC | Age: 72
End: 2022-03-14

## 2022-03-14 NOTE — TELEPHONE ENCOUNTER
ee     Received call from Namrata curtis at [facility] with Red Flag Complaint. Subjective: Caller states \"Left earache started last Tuesday. Saw Dr. Mavis Ross with antibiotics that made her sick. Feels like the room is tilting and causing her to feel sick and burp\"     Current Symptoms:vertigo, ear infection, nausea, blurry vison, modereate izziness    Onset: 3 weeks ago; sudden, rapid, worsening, waxing and waning    Associated Symptoms: reduced activity    Pain Severity: 8/10; aching, pain and deep aching, terrible ache in top of head; constant, severe    Temperature: no n/a    What has been tried: ear drops, antibiotics, aspirin, fluids, CBD oil    LMP: NA Pregnant: NA    Recommended disposition: See PCP within 24 Hours    Care advice provided, patient verbalizes understanding; denies any other questions or concerns; instructed to call back for any new or worsening symptoms. Patient/Caller agrees with recommended disposition; writer provided warm transfer to Harsh Dempsey at [facility] for appointment scheduling    Attention Provider: Thank you for allowing me to participate in the care of your patient. The patient was connected to triage in response to information provided to the [facility]. Please do not respond through this encounter as the response is not directed to a shared pool.         Reason for Disposition   [1] MODERATE dizziness (e.g., vertigo; feels very unsteady, interferes with normal activities) AND [2] has NOT been evaluated by physician for this    Protocols used: DIZZINESS - VERTIGO-ADULT-

## 2022-03-17 ENCOUNTER — OFFICE VISIT (OUTPATIENT)
Dept: PRIMARY CARE CLINIC | Age: 72
End: 2022-03-17
Payer: MEDICARE

## 2022-03-17 VITALS
HEART RATE: 63 BPM | WEIGHT: 250 LBS | RESPIRATION RATE: 16 BRPM | SYSTOLIC BLOOD PRESSURE: 126 MMHG | DIASTOLIC BLOOD PRESSURE: 79 MMHG | HEIGHT: 66 IN | OXYGEN SATURATION: 95 % | TEMPERATURE: 97.5 F | BODY MASS INDEX: 40.18 KG/M2

## 2022-03-17 DIAGNOSIS — E78.2 MIXED HYPERLIPIDEMIA: ICD-10-CM

## 2022-03-17 DIAGNOSIS — H55.00 NYSTAGMUS: ICD-10-CM

## 2022-03-17 DIAGNOSIS — H81.10 BENIGN PAROXYSMAL POSITIONAL VERTIGO, UNSPECIFIED LATERALITY: ICD-10-CM

## 2022-03-17 DIAGNOSIS — R73.03 PREDIABETES: ICD-10-CM

## 2022-03-17 DIAGNOSIS — M17.0 PRIMARY OSTEOARTHRITIS OF BOTH KNEES: ICD-10-CM

## 2022-03-17 DIAGNOSIS — H66.003 NON-RECURRENT ACUTE SUPPURATIVE OTITIS MEDIA OF BOTH EARS WITHOUT SPONTANEOUS RUPTURE OF TYMPANIC MEMBRANES: Primary | ICD-10-CM

## 2022-03-17 DIAGNOSIS — Z79.899 MEDICATION MANAGEMENT: ICD-10-CM

## 2022-03-17 DIAGNOSIS — Z77.120 MOLD EXPOSURE: ICD-10-CM

## 2022-03-17 DIAGNOSIS — E03.9 ACQUIRED HYPOTHYROIDISM: ICD-10-CM

## 2022-03-17 PROCEDURE — 99204 OFFICE O/P NEW MOD 45 MIN: CPT | Performed by: NURSE PRACTITIONER

## 2022-03-17 RX ORDER — LEVOTHYROXINE AND LIOTHYRONINE 19; 4.5 UG/1; UG/1
30 TABLET ORAL DAILY
COMMUNITY
End: 2022-10-19 | Stop reason: ALTCHOICE

## 2022-03-17 RX ORDER — DOXYCYCLINE 100 MG/1
100 CAPSULE ORAL 2 TIMES DAILY
Qty: 20 CAPSULE | Refills: 0 | Status: SHIPPED | OUTPATIENT
Start: 2022-03-17 | End: 2022-03-27

## 2022-03-17 NOTE — PROGRESS NOTES
Chief Complaint   Patient presents with    Ear Pain     started on 03/03/22- on the pt went to urgent care- both ears are now painful-        Health Maintenance Due   Topic    Hepatitis C Screening     COVID-19 Vaccine (1)    DTaP/Tdap/Td series (1 - Tdap)    Colorectal Cancer Screening Combo     Shingrix Vaccine Age 50> (1 of 2)    Pneumococcal 65+ years (1 of 1 - PPSV23)    Medicare Yearly Exam     Flu Vaccine (1)        1. Have you been to the ER, urgent care clinic since your last visit? Hospitalized since your last visit? Yes Reason for visit: urgent care on 03/08/22- ear pain    2. Have you seen or consulted any other health care providers outside of the [address] [place] [address] since your last visit? Include any pap smears or colon screening.  No    Visit Vitals  Resp 16   Ht 5' 5.5\" (1.664 m)   Wt 250 lb (113.4 kg)   BMI 40.97 kg/m²

## 2022-03-17 NOTE — PROGRESS NOTES
[facility]   [address], [address] [facility]  P: [phone]  F: [phone]    SUBJECTIVE     HPI:     Haritha Ovalles is a 66 y.o. female who is seen in the clinic for   Chief Complaint   Patient presents with    Ear Pain     started on 03/03/22- on the pt went to urgent care- both ears are now painful-       On 3/3, went to [facility] and was dx with Bilateral Otitis Media. Prescribed Amoxicillin and has used herbal supplements with minimal relief. She is on her 10th day of Amoxicillin.      Patient Active Problem List    Diagnosis    Obesity, morbid ([address]    Carpal tunnel syndrome, bilateral upper limbs    Cervical radiculopathy due to degenerative joint disease of spine    Multinodular goiter    Hypothyroidism (acquired)    Anxiety disorder    Unspecified vitamin D deficiency    Supraventricular tachycardia, paroxysmal (HCC)    Fibromyalgia    Chest pain, unspecified    Iatrogenic hyperthyroidism    Skin cancer    Chronic fatigue syndrome    Obesity, unspecified    Palpitations    Impaired glucose tolerance        Past Medical History:   Diagnosis Date    BPPV (benign paroxysmal positional vertigo)     Chronic fatigue syndrome 3/16/2012    Fibromyalgia     BRAVO on CPAP     BiPAP    Other dyspnea and respiratory abnormality     Palpitations     Prediabetes     Skin cancer 3/16/2012    Unspecified hypothyroidism     goiter    Vitamin D deficiency      Past Surgical History:   Procedure Laterality Date    HX BREAST LUMPECTOMY  7/2013    right breast    HX TONSILLECTOMY      HX UROLOGICAL      urethral opening was widened     Social History     Socioeconomic History    Marital status:      Spouse name: Not on file    Number of children: Not on file    Years of education: Not on file    Highest education level: Not on file   Occupational History    Not on file   Tobacco Use    Smoking status: Never Smoker    Smokeless tobacco: Never Used   Vaping Use    Vaping Use: Never used   Substance and Sexual Activity    Alcohol use: Yes     Alcohol/week: 1.0 standard drink     Types: 1 Glasses of wine per week     Comment: Rare 0.5 glass of wine    Drug use: No    Sexual activity: Yes     Partners: Male   Other Topics Concern    Not on file   Social History Narrative    [facility] with . Used to work as a  for [employer]. Social Determinants of Health     Financial Resource Strain:     Difficulty of Paying Living Expenses: Not on file   Food Insecurity:     Worried About Running Out of Food in the Last Year: Not on file    Jeff of Food in the Last Year: Not on file   Transportation Needs:     Lack of Transportation (Medical): Not on file    Lack of Transportation (Non-Medical):  Not on file   Physical Activity:     Days of Exercise per Week: Not on file    Minutes of Exercise per Session: Not on file   Stress:     Feeling of Stress : Not on file   Social Connections:     Frequency of Communication with Friends and Family: Not on file    Frequency of Social Gatherings with Friends and Family: Not on file    Attends Evangelical Services: Not on file    Active Member of Clubs or Organizations: Not on file    Attends Club or Organization Meetings: Not on file    Marital Status: Not on file   Intimate Partner Violence:     Fear of Current or Ex-Partner: Not on file    Emotionally Abused: Not on file    Physically Abused: Not on file    Sexually Abused: Not on file   Housing Stability:     Unable to Pay for Housing in the Last Year: Not on file    Number of [place] in the Last Year: Not on file    Unstable Housing in the Last Year: Not on file     Family History   Problem Relation Age of Onset    Coronary Art Dis Mother     Thyroid Disease Mother     Diabetes Mother     Cancer Mother         Multiple Myeloma    Cancer Father         Colon cancer    OSTEOARTHRITIS Father    Genetgeorge Wenls Heart Disease Father     High Cholesterol Brother     Thyroid Disease Brother         thyroid cancer    Cancer Brother         Thyroid and Skin    Diabetes Maternal Aunt     Hypertension Other     Heart Disease Other     Depression Other     Anxiety Other     Cancer Sister         Skin    Cancer Brother         Multiple Myeloma and skin    Cancer Brother         Skin       There is no immunization history on file for this patient. Allergies   Allergen Reactions    Lidocaine Other (comments)     Body Shakes    Adhes.  Band-Tape-Benzalkonium Swelling    Adhesive Swelling    Dairy Aid [Lactase] Unknown (comments)    Gluten Diarrhea     \"brain fog\", aching    Iodinated Contrast Media Other (comments)     Gets dizzy and shakey    Milk Containing Products Other (comments)     Aches and stomach cramps    Motrin [Ibuprofen] Unknown (comments)    Other Medication Other (comments)     Super sensitive to all medication    Soy Unknown (comments)    Sulfa (Sulfonamide Antibiotics) Unable to Obtain    Trulicity [Dulaglutide] Nausea Only     Stomach discomfort    Wheat Unknown (comments)       Orders Only on 12/23/2021   Component Date Value Ref Range Status    Special Requests: 12/23/2021 NO SPECIAL REQUESTS    Final    Culture result: 12/23/2021 No growth (<1,000 CFU/ML)    Final    Color 12/23/2021 YELLOW/STRAW    Final    Color Reference Range: Straw, Yellow or Dark Yellow    Appearance 12/23/2021 CLEAR  CLEAR   Final    Specific gravity 12/23/2021 1.010  1.003 - 1.030   Final    pH (UA) 12/23/2021 5.5  5.0 - 8.0   Final    Protein 12/23/2021 Negative  Negative mg/dL Final    Glucose 12/23/2021 Negative  Negative mg/dL Final    Ketone 12/23/2021 Negative  Negative mg/dL Final    Bilirubin 12/23/2021 Negative  Negative   Final    Blood 12/23/2021 Negative  Negative   Final    Urobilinogen 12/23/2021 0.2  0.2 - 1.0 EU/dL Final    Nitrites 12/23/2021 Negative  Negative   Final    Leukocyte Esterase 12/23/2021 Negative  Negative   Final    Cholesterol, total 12/23/2021 253* <200 MG/DL Final    Triglyceride 12/23/2021 217* <150 MG/DL Final    Comment: Based on NCEP-ATP III:  Triglycerides <150 mg/dL  is considered normal, 150-199  mg/dL  borderline high,  200-499 mg/dL high and  greater than or equal to 500  mg/dL very high.  HDL Cholesterol 12/23/2021 45  MG/DL Final    Comment: Based on NCEP ATP III, HDL Cholesterol <40 mg/dL is considered low and >60  mg/dL is elevated.       LDL, calculated 12/23/2021 164.6* 0 - 100 MG/DL Final    Comment: Based on the NCEP-ATP: LDL-C concentrations are considered  optimal <100 mg/dL,  near optimal/above Normal 100-129 mg/dL Borderline High: 130-159, High: 160-189  mg/dL Very High: Greater than or equal to 190 mg/dL      VLDL, calculated 12/23/2021 43.4  MG/DL Final    CHOL/HDL Ratio 12/23/2021 5.6* 0.0 - 5.0   Final    WBC 12/23/2021 4.8  3.6 - 11.0 K/uL Final    RBC 12/23/2021 4.69  3.80 - 5.20 M/uL Final    HGB 12/23/2021 14.3  11.5 - 16.0 g/dL Final    HCT 12/23/2021 45.2  35.0 - 47.0 % Final    MCV 12/23/2021 96.4  80.0 - 99.0 FL Final    MCH 12/23/2021 30.5  26.0 - 34.0 PG Final    MCHC 12/23/2021 31.6  30.0 - 36.5 g/dL Final    RDW 12/23/2021 12.6  11.5 - 14.5 % Final    PLATELET 29/57/9480 007  150 - 400 K/uL Final    MPV 12/23/2021 10.0  8.9 - 12.9 FL Final    NRBC 12/23/2021 0.0  0  WBC Final    ABSOLUTE NRBC 12/23/2021 0.00  0.00 - 0.01 K/uL Final    Hemoglobin A1c 12/23/2021 5.6  4.0 - 5.6 % Final    Comment: NEW METHOD PLEASE NOTE NEW REFERENCE RANGE  (NOTE)  HbA1C Interpretive Ranges  <5.7              Normal  5.7 - 6.4         Consider Prediabetes  >6.5              Consider Diabetes      Est. average glucose 12/23/2021 114  mg/dL Final    Iron 12/23/2021 106  35 - 150 ug/dL Final    TIBC 12/23/2021 371  250 - 450 ug/dL Final    Iron % saturation 12/23/2021 29  20 - 50 % Final    T3, total 12/23/2021 133  71 - 180 ng/dL Final    Comment: (NOTE)  Performed At: [facility] & [address] [id number]  Ramakrishna Vega MD L      T4, Free 2021 0.8  0.8 - 1.5 NG/DL Final    TSH 2021 8.75* 0.36 - 3.74 uIU/mL Final    Comment:   Due to TSH heterogeneity, both structurally and degree of glycosylation,  monoclonal antibodies used in the TSH assay may not accurately quantitate TSH. Therefore, this result should be correlated with clinical findings as well as  with other assessments of thyroid function, e.g., free T4, free T3.  Vitamin D 25-Hydroxy 2021 47.1  30 - 100 ng/mL Final    Comment: (NOTE)  Deficiency               <20 ng/mL  Insufficiency          20-30 ng/mL  Sufficient             ng/mL  Possible toxicity       >100 ng/mL    The Method used is Siemens Advia Centaur currently standardized to a   Center of Disease Control and Prevention (CDC) certified reference   [address]. Samples containing fluorescein dye can produce falsely   elevated values when tested with the ADVIA Centaur Vitamin D Assay. It is recommended that results in the toxic range, >100 ng/mL, be   retested 72 hours post fluorescein exposure.       Sodium 2021 138  136 - 145 mmol/L Final    Potassium 2021 4.2  3.5 - 5.1 mmol/L Final    Chloride 2021 107  97 - 108 mmol/L Final    CO2 2021 27  21 - 32 mmol/L Final    Anion gap 2021 4* 5 - 15 mmol/L Final    Glucose 2021 100  65 - 100 mg/dL Final    BUN 2021 18  6 - 20 MG/DL Final    Creatinine 2021 0.72  0.55 - 1.02 MG/DL Final    BUN/Creatinine ratio 2021 25* 12 - 20   Final    GFR est AA 2021 >60  >60 ml/min/1.73m2 Final    GFR est non-AA 2021 >60  >60 ml/min/1.73m2 Final    Comment: Estimated GFR is calculated using the IDMS-traceable Modification of Diet in  Renal Disease (MDRD) Study equation, reported for both  Americans  (GFRAA) and non- Americans [facility]), and normalized to 1.73m2 body  surface area. The physician must decide which value applies to the patient.  Calcium 12/23/2021 9.7  8.5 - 10.1 MG/DL Final    Bilirubin, total 12/23/2021 0.5  0.2 - 1.0 MG/DL Final    ALT (SGPT) 12/23/2021 70  12 - 78 U/L Final    AST (SGOT) 12/23/2021 36  15 - 37 U/L Final    Alk. phosphatase 12/23/2021 71  45 - 117 U/L Final    Protein, total 12/23/2021 7.5  6.4 - 8.2 g/dL Final    Albumin 12/23/2021 4.2  3.5 - 5.0 g/dL Final    Globulin 12/23/2021 3.3  2.0 - 4.0 g/dL Final    A-G Ratio 12/23/2021 1.3  1.1 - 2.2   Final    SAMPLES BEING HELD 12/23/2021 1 sst   Final    COMMENT 12/23/2021 Add-on orders for these samples will be processed based on acceptable specimen integrity and analyte stability, which may vary by analyte. Final       MAMMOGRAPHY   [employer]  Outside Information       Breast DM Screening Kal W/ Tiago    Anatomical Region Laterality Modality  Breast bilateral Mammography    Impression  Performed by Lele Flores  No mammographic evidence of malignancy. BI-RADS Category:   Overall Assessment: 2 - Benign     RECOMMENDATION:   Annual Screening Mammogram is recommended for both breasts. The results were communicated to the patient in writing. Narrative  Performed by [place] [facility]  RESULT:   Breast DM Screening Kal W/ Tiago     HISTORY:   Patient is a 66 y.o. and is seen for screening. The patient has not had a   COVID-19 vaccine.    Medical history includes breast cancer (Lumpectomy, no treatment with RT   or chemo). Procedure history includes right lumpectomy and breast biopsy. Family medical history includes breast cancer in 2 relatives (maternal   aunt, maternal grandmother) and ovarian cancer in maternal aunt. Hormone   history includes hormone replacement therapy (cream...approx. 1 month). FILMS COMPARED:   Prior Images available and compared.      FINDINGS:   Tomosynthesis projections and reconstructed 2D images. Computer-aided   detection was utilized by the radiologist in the interpretation of this   examination. Mammogram breast composition: There are scattered areas of fibroglandular   density. There are stable postlumpectomy changes in the right breast with surgical   clips. There are scattered stable areas of asymmetry, most compatible with   normal variant. There is no evidence of suspicious masses, microcalcifications or   architectural distortion. Exam End: 02/10/22  2:44 PM Last Resulted: 02/10/22  2:50 PM  Received From: Catherine Zapata [facility]  Result Received: 02/28/22 12:37 PM   View Encounter      Current Outpatient Medications   Medication Sig Dispense Refill    thyroid, Pork, ([place] Thyroid) 30 mg tablet Take 30 mg by mouth two (2) times a day. Pt takes on pill at 8 am and the second at 1pm      pseudoephedrine HCl (SUDAFED PO) Take 10 mg by mouth.  OTHER uritrax      BABY ASPIRIN PO Take 81 mg by mouth. Pt states she takes one to two pills      OTHER cbd oil 6 drops twice a day      OTHER Cbd cream on knees twice a day for knee pain      VITAMIN A PO Take 3,000 mg by mouth daily.  doxycycline (VIBRAMYCIN) 100 mg capsule Take 1 Capsule by mouth two (2) times a day for 10 days. 20 Capsule 0    magnesium gluconate 500 mg (27 mg elemental magnesium) tab tablet magnesium 500 mg tablet   Take by oral route.  zinc gluconate 50 mg tablet Take 50 mg by mouth daily.  Levothyroxine (Tirosint) 75 mcg cap Take 75 mcg by mouth daily. 90 Capsule 0    [place] Thyroid 15 mg tablet Take 15 mg by mouth daily. Pt takes before breakfast      lysine (L-LYSINE) 500 mg tab tablet lysine 500 mg tablet   Take by oral route.  OTHER Take 70 mg by mouth daily.  Indications: Progestrone      cholecalciferol, VITAMIN D3, (VITAMIN D3) 5,000 unit tab tablet 38069 iu (in drop form), K2             The past medical history, past surgical history, and family history were reviewed and updated in the medical record. Lab values/Imaging were reviewed. The medications were reviewed and updated in the medical record. Immunizations were reviewed and updated in the medical record. All relevant preventative screenings reviewed and updated in the medical record. REVIEW OF SYSTEMS   Review of Systems   Constitutional: Negative for chills, diaphoresis, fever and malaise/fatigue. HENT: Positive for ear pain, sinus pain and tinnitus. Negative for congestion, ear discharge, hearing loss and sore throat. Eyes: Positive for blurred vision. Negative for pain and discharge. Respiratory: Negative for cough, sputum production, shortness of breath, wheezing and stridor. Cardiovascular: Negative for chest pain and palpitations. Musculoskeletal: Negative for myalgias. Neurological: Positive for dizziness, weakness and headaches. Negative for tingling, tremors, sensory change, speech change, seizures and loss of consciousness. Endo/Heme/Allergies: Negative for environmental allergies. Psychiatric/Behavioral: Negative for depression. The patient is not nervous/anxious and does not have insomnia. PHYSICAL EXAM   /79 (BP 1 Location: Left upper arm, BP Patient Position: Sitting, BP Cuff Size: Adult long)   Pulse 63   Temp 97.5 °F (36.4 °C) (Temporal)   Resp 16   Ht 5' 5.5\" (1.664 m)   Wt 250 lb (113.4 kg)   SpO2 95%   BMI 40.97 kg/m²      Physical Exam  Constitutional:       General: She is not in acute distress. Appearance: Normal appearance. She is obese. She is not ill-appearing. HENT:      Right Ear: There is no impacted cerumen. Left Ear: There is no impacted cerumen. Ears:      Comments: Erythema of bilateral external canal. Cloudy, white mucous surrounding bilateral TM. Nose: Nose normal. No congestion or rhinorrhea. Eyes:      Extraocular Movements: Extraocular movements intact. Pupils: Pupils are equal, round, and reactive to light.       Comments: Nystagmus. Cardiovascular:      Rate and Rhythm: Normal rate and regular rhythm. Pulses: Normal pulses. Heart sounds: Normal heart sounds. Pulmonary:      Effort: Pulmonary effort is normal. No respiratory distress. Breath sounds: Normal breath sounds. No wheezing. Neurological:      General: No focal deficit present. Mental Status: She is alert. Cranial Nerves: No cranial nerve deficit. Sensory: No sensory deficit. Psychiatric:         Mood and Affect: Mood normal.            ASSESSMENT/ PLAN   Below is the assessment and plan developed based on review of pertinent history, physical exam, labs, studies, and medications. 1. Non-recurrent acute suppurative otitis media of both ears without spontaneous rupture of tympanic membranes  Due to patient having multiple drug allergies, she can only take Doxycycline (allergy to Cipro). Risks/benefits of medication dicussed along with potential s/e. Always take med with food. -     doxycycline (VIBRAMYCIN) 100 mg capsule; Take 1 Capsule by mouth two (2) times a day for 10 days. , Normal, Disp-20 Capsule, R-0  2. Benign paroxysmal positional vertigo, unspecified laterality  Patient has an appointment with The [address] [place] [address] today at 1 30. Also, she has an appointment with [facility] in late March. Patient does not want to take Meclizine at this time.   -     REFERRAL TO PHYSICAL THERAPY  3. Nystagmus  Very noticeable on exam. Has caused minor blurred vision in bilateral eyes. Will appreciate future Opthalmology recs. -     REFERRAL TO OPHTHALMOLOGY  4. Mold exposure  -     VASCULAR ENDOTHELIAL GROWTH FACTOR; Future  5. Acquired hypothyroidism  -     TSH 3RD GENERATION; Future  -     T4, FREE; Future  6. Mixed hyperlipidemia  7. Prediabetes  -     HEMOGLOBIN A1C WITH EAG; Future  8. Primary osteoarthritis of both knees  -     REFERRAL TO PHYSICAL THERAPY  9. Medication management  -     METABOLIC PANEL, COMPREHENSIVE;  Future Please follow up with clinic by Monday afternoon if symptoms do not resolve with Doxycycline. Follow-up and Dispositions    · Return ENT symptoms do not resolve with Doxycyline. .           Disclaimer:  Advised patient to call back or return to office if symptoms worsen/change/persist.  Discussed expected course/resolution/complications of diagnosis in detail with patient. Medication risks/benefits/alternatives discussed with patient. Patient was given an after visit summary which includes diagnoses, current medications, & vitals. Discussed patient instructions and advised to read to all patient instructions regarding care. Patient expressed understanding with the diagnosis and plan.        Ade Crouch NP  3/17/2022

## 2022-03-18 ENCOUNTER — TELEPHONE (OUTPATIENT)
Dept: PRIMARY CARE CLINIC | Age: 72
End: 2022-03-18

## 2022-03-18 PROBLEM — E66.01 OBESITY, MORBID (HCC): Status: ACTIVE | Noted: 2018-04-16

## 2022-03-18 NOTE — TELEPHONE ENCOUNTER
----- Message from Jacky Jimenez NP sent at 3/18/2022  8:08 AM EDT -----  Semaj Hallman, please relay results/recommendations. HA1C has risen slightly. Please be mindful of your carb intake and try to exercise regularly. Your TSH and T4 are improved. Please continue with same medication regimen. Your ALT/AST are elevated. Do you have any abdominal pain, Nausea, yellowing of skin, etc.? If not, we will re-check at your next appointment. Please do not take Tylenol in the meantime. Do not drink alcohol.

## 2022-03-18 NOTE — TELEPHONE ENCOUNTER
Left voicemail to call office regarding lab results and that they can also been seen on my chart to call if she has any questions

## 2022-03-19 PROBLEM — M47.22 CERVICAL RADICULOPATHY DUE TO DEGENERATIVE JOINT DISEASE OF SPINE: Status: ACTIVE | Noted: 2017-04-19

## 2022-03-19 PROBLEM — G56.03 CARPAL TUNNEL SYNDROME, BILATERAL UPPER LIMBS: Status: ACTIVE | Noted: 2017-04-19

## 2022-03-21 ENCOUNTER — TELEPHONE (OUTPATIENT)
Dept: PRIMARY CARE CLINIC | Age: 72
End: 2022-03-21

## 2022-03-21 ENCOUNTER — NURSE TRIAGE (OUTPATIENT)
Dept: OTHER | Facility: CLINIC | Age: 72
End: 2022-03-21

## 2022-03-21 NOTE — TELEPHONE ENCOUNTER
Received call from [facility] at [facility] with Red Flag Complaint. Subjective: Caller states \" since the 3rd of march earache. Infection in ears. Saw jaya on Friday recommended doxycycline. Last night and yesterday afternoon reading your not suppose to take the pill with a antacids . Saturday night didn't sleep at all burping. I did take one tab yest. Morning and one last night   \"     Current Symptoms:  Pt has ear infection in both ears. Pt reports the doxycycline  Is making her sick. Also has dizziness since appt Friday. Pt denies trouble breathing. Pt hx hital hernia     Onset: 3-3-22    Associated Symptoms: NA    Pain Severity: headache  7-8/10; aching; constant   Pain upper stomach between in breast bone 8/10 constant     Temperature: no fever     What has been tried: sudafed     LMP: NA Pregnant: NA    Recommended disposition: Go to ED Now    Care advice provided, patient verbalizes understanding; denies any other questions or concerns; instructed to call back for any new or worsening symptoms. Patient/caller agrees to proceed to nearest Emergency Department    Attention Provider: Thank you for allowing me to participate in the care of your patient. The patient was connected to triage in response to information provided to the [facility]. Please do not respond through this encounter as the response is not directed to a shared pool.       Reason for Disposition   Patient sounds very sick or weak to the triager   [1] Pain lasts > 10 minutes AND [2] age > 48     Pt reports patient dizziness as well    Protocols used: EAR - OTITIS EXTERNA FOLLOW-UP CALL-ADULT-, ABDOMINAL PAIN - UPPER-ADULT-

## 2022-04-04 RX ORDER — LEVOTHYROXINE SODIUM 75 UG/1
75 CAPSULE ORAL DAILY
Qty: 90 CAPSULE | Refills: 0 | Status: SHIPPED | OUTPATIENT
Start: 2022-04-04 | End: 2022-04-11 | Stop reason: SDUPTHER

## 2022-04-04 NOTE — TELEPHONE ENCOUNTER
Requested Prescriptions     Pending Prescriptions Disp Refills    Levothyroxine (Tirosint) 75 mcg cap 90 Capsule 0     Sig: Take 75 mcg by mouth daily.         Last Visit 03/17/22  Last Refill 01/04/22

## 2022-04-11 NOTE — TELEPHONE ENCOUNTER
Alternative requested: capsule formulation not covered, change to synthroid tablet (suggested alternative: euthyrox 75mcg tablet, levothyroxine 75mcg tablet)

## 2022-04-12 ENCOUNTER — TELEPHONE (OUTPATIENT)
Dept: NEUROLOGY | Age: 72
End: 2022-04-12

## 2022-04-12 RX ORDER — LEVOTHYROXINE SODIUM 75 UG/1
75 CAPSULE ORAL DAILY
Qty: 90 CAPSULE | Refills: 0 | Status: SHIPPED | OUTPATIENT
Start: 2022-04-12 | End: 2022-07-13 | Stop reason: SDUPTHER

## 2022-04-13 ENCOUNTER — OFFICE VISIT (OUTPATIENT)
Dept: OBGYN CLINIC | Age: 72
End: 2022-04-13
Payer: MEDICARE

## 2022-04-13 ENCOUNTER — TELEPHONE (OUTPATIENT)
Dept: PRIMARY CARE CLINIC | Age: 72
End: 2022-04-13

## 2022-04-13 VITALS — DIASTOLIC BLOOD PRESSURE: 70 MMHG | WEIGHT: 250 LBS | BODY MASS INDEX: 40.97 KG/M2 | SYSTOLIC BLOOD PRESSURE: 124 MMHG

## 2022-04-13 DIAGNOSIS — Z01.419 WELL WOMAN EXAM WITH ROUTINE GYNECOLOGICAL EXAM: Primary | ICD-10-CM

## 2022-04-13 PROCEDURE — 1101F PT FALLS ASSESS-DOCD LE1/YR: CPT | Performed by: OBSTETRICS & GYNECOLOGY

## 2022-04-13 PROCEDURE — G8417 CALC BMI ABV UP PARAM F/U: HCPCS | Performed by: OBSTETRICS & GYNECOLOGY

## 2022-04-13 PROCEDURE — 3017F COLORECTAL CA SCREEN DOC REV: CPT | Performed by: OBSTETRICS & GYNECOLOGY

## 2022-04-13 PROCEDURE — G9899 SCRN MAM PERF RSLTS DOC: HCPCS | Performed by: OBSTETRICS & GYNECOLOGY

## 2022-04-13 PROCEDURE — G0101 CA SCREEN;PELVIC/BREAST EXAM: HCPCS | Performed by: OBSTETRICS & GYNECOLOGY

## 2022-04-13 PROCEDURE — 1090F PRES/ABSN URINE INCON ASSESS: CPT | Performed by: OBSTETRICS & GYNECOLOGY

## 2022-04-13 PROCEDURE — G8432 DEP SCR NOT DOC, RNG: HCPCS | Performed by: OBSTETRICS & GYNECOLOGY

## 2022-04-13 NOTE — TELEPHONE ENCOUNTER
Sophia Staff from [facility] needs to talk to someone about a faxed and verbal prescription for physical therapy. She says the prescription expires April 17th. Johnson Speaks signed the prescription.       Sophia Staff  [phone]

## 2022-04-13 NOTE — PROGRESS NOTES
Annual exam ages postmenopausal    Dee Dee Chatterjee is a No obstetric history on file. ,  66 y.o. female WHITE/NON- No LMP recorded. Patient is postmenopausal..    She presents for her annual checkup. She is having no significant problems. She would like to discuss hormone replacement options. New onset of having hot flashes. Recent normal thyroid testing on current regimen. Currently using progesterone cream; gets from [facility]. Currently working with NP with thyroid, but would like to be followed elsewhere. Recent ear infection    Colonoscopy done about a year ago     Menstrual status:    Now menopausal  She is not using ERT    Sexual history:    She  reports previously being sexually active and has had partner(s) who are male. Medical conditions:    Since her last annual GYN exam about six ago, she has not the following changes in her health history: none. Pap and Mammogram History:    Her most recent Pap smear was normal, obtained many year(s) ago. The patient had a mammogram in 02/2022 and it was negative for malignancy. .    Breast Cancer History/Substance Abuse: breast lumpectomy about 6 years ago. Osteoporosis History:    Family history does not include a first or second degree relative with osteopenia or osteoporosis.       Past Medical History:   Diagnosis Date    BPPV (benign paroxysmal positional vertigo)     Chronic fatigue syndrome 3/16/2012    Fibromyalgia     BRAVO on CPAP     BiPAP    Other dyspnea and respiratory abnormality     Palpitations     Prediabetes     Skin cancer 3/16/2012    Unspecified hypothyroidism     goiter    Vitamin D deficiency      Past Surgical History:   Procedure Laterality Date    HX BREAST LUMPECTOMY  7/2013    right breast    HX TONSILLECTOMY      HX UROLOGICAL      urethral opening was widened       Current Outpatient Medications   Medication Sig Dispense Refill    Levothyroxine (Tirosint) 75 mcg cap Take 75 mcg by mouth daily. 90 Capsule 0    thyroid, Pork, (Seymour Thyroid) 30 mg tablet Take 30 mg by mouth two (2) times a day. Pt takes on pill at 8 am and the second at 1pm      pseudoephedrine HCl (SUDAFED PO) Take 10 mg by mouth.  OTHER uritrax      BABY ASPIRIN PO Take 81 mg by mouth. Pt states she takes one to two pills      OTHER cbd oil 6 drops twice a day      OTHER Cbd cream on knees twice a day for knee pain      VITAMIN A PO Take 3,000 mg by mouth daily.  magnesium gluconate 500 mg (27 mg elemental magnesium) tab tablet magnesium 500 mg tablet   Take by oral route.  zinc gluconate 50 mg tablet Take 50 mg by mouth daily.  Seymour Thyroid 15 mg tablet Take 15 mg by mouth daily. Pt takes before breakfast      lysine (L-LYSINE) 500 mg tab tablet lysine 500 mg tablet   Take by oral route.  OTHER Take 70 mg by mouth daily. Indications: Progestrone      cholecalciferol, VITAMIN D3, (VITAMIN D3) 5,000 unit tab tablet 18590 iu (in drop form), K2       Allergies: Lidocaine, Adhes. band-tape-benzalkonium, Adhesive, Dairy aid [lactase], Gluten, Iodinated contrast media, Milk containing products, Motrin [ibuprofen], Other medication, Soy, Sulfa (sulfonamide antibiotics), Trulicity [dulaglutide], and Wheat     Tobacco History:  reports that she has never smoked. She has never used smokeless tobacco.  Alcohol Abuse:  reports current alcohol use of about 1.0 standard drink of alcohol per week. Drug Abuse:  reports no history of drug use.     Family Medical/Cancer History:   Family History   Problem Relation Age of Onset    Coronary Art Dis Mother     Thyroid Disease Mother     Diabetes Mother     Cancer Mother         Multiple Myeloma    Cancer Father         Colon cancer    OSTEOARTHRITIS Father     Heart Disease Father     High Cholesterol Brother     Thyroid Disease Brother         thyroid cancer    Cancer Brother         Thyroid and Skin    Diabetes Maternal Aunt     Hypertension Other     Heart Disease Other     Depression Other     Anxiety Other     Cancer Sister         Skin    Cancer Brother         Multiple Myeloma and skin    Cancer Brother         Skin        Review of Systems - History obtained from the patient  Constitutional: negative for weight loss, fever, night sweats  HEENT: negative for hearing loss, earache, congestion, snoring, sorethroat  CV: negative for chest pain, palpitations, edema  Resp: negative for cough, shortness of breath, wheezing  GI: negative for change in bowel habits, abdominal pain, black or bloody stools  : negative for frequency, dysuria, hematuria, vaginal discharge  MSK: negative for back pain, joint pain, muscle pain  Breast: negative for breast lumps, nipple discharge, galactorrhea  Skin :negative for itching, rash, hives  Neuro: negative for dizziness, headache, confusion, weakness  Psych: negative for anxiety, depression, change in mood  Heme/lymph: negative for bleeding, bruising, pallor    Physical Exam    Visit Vitals  /70   Wt 250 lb (113.4 kg)   BMI 40.97 kg/m²       Constitutional  · Appearance: well-nourished, well developed, alert, in no acute distress    HENT  · Head and Face: appears normal    Chest  · Respiratory Effort: breathing unlabored      Breasts  · Inspection of Breasts: breasts symmetrical, no skin changes, no discharge present, nipple appearance normal, no skin retraction present  · Palpation of Breasts and Axillae: no masses present on palpation, no breast tenderness  · Axillary Lymph Nodes: no lymphadenopathy present    Gastrointestinal  · Abdominal Examination: abdomen non-tender to palpation, normal bowel sounds, no masses present  · Liver and spleen: no hepatomegaly present, spleen not palpable  · Hernias: no hernias identified    Skin  · General Inspection: no rash, no lesions identified    Neurologic/Psychiatric  · Mental Status:  · Orientation: grossly oriented to person, place and time  · Mood and Affect: mood normal, affect appropriate    Genitourinary  · External Genitalia: normal appearance for age, no discharge present, no tenderness present, no inflammatory lesions present, no masses present, no atrophy present  · Vagina: normal vaginal vault without central or paravaginal defects, no discharge present, no inflammatory lesions present, no masses present  · Bladder: non-tender to palpation  · Urethra: appears normal  · Cervix: normal   · Uterus: normal size, shape and consistency  · Adnexa: no adnexal tenderness present, no adnexal masses present  · Perineum: perineum within normal limits, no evidence of trauma, no rashes or skin lesions present  · Anus: anus within normal limits, no hemorrhoids present  · Inguinal Lymph Nodes: no lymphadenopathy present    Assessment:  Routine gynecologic examination; postmenopausal  Her current medical status is satisfactory with no evidence of significant gynecologic issues. Plan:  Pap not done today     Patient declines presence of chaperone during today's visit.    Counseled re: diet, exercise, healthy lifestyle  Return for yearly wellness visits  Rec annual mammogram

## 2022-04-13 NOTE — TELEPHONE ENCOUNTER
Jose David Escobar - Please troubleshoot. I placed a referral at her visit on 2/28/22. Why do they need another one? If they did not get the original one, then fax a copy to them.

## 2022-04-13 NOTE — PATIENT INSTRUCTIONS
Well Visit, Over 72: Care Instructions  Overview     Well visits can help you stay healthy. Your doctor has checked your overall health and may have suggested ways to take good care of yourself. Your doctor also may have recommended tests. At home, you can help prevent illness with healthy eating, regular exercise, and other steps. Follow-up care is a key part of your treatment and safety. Be sure to make and go to all appointments, and call your doctor if you are having problems. It's also a good idea to know your test results and keep a list of the medicines you take. How can you care for yourself at home? · Get screening tests that you and your doctor decide on. Screening helps find diseases before any symptoms appear. · Eat healthy foods. Choose fruits, vegetables, whole grains, protein, and low-fat dairy foods. Limit fat, especially saturated fat. Reduce salt in your diet. · Limit alcohol. If you are a man, have no more than 2 drinks a day or 14 drinks a week. If you are a woman, have no more than 1 drink a day or 7 drinks a week. Since alcohol affects older adults differently, you may want to limit alcohol even more. Or you may not want to drink at all. · Get at least 30 minutes of exercise on most days of the week. Walking is a good choice. You also may want to do other activities, such as running, swimming, cycling, or playing tennis or team sports. · Reach and stay at a healthy weight. This will lower your risk for many problems, such as obesity, diabetes, heart disease, and high blood pressure. · Do not smoke. Smoking can make health problems worse. If you need help quitting, talk to your doctor about stop-smoking programs and medicines. These can increase your chances of quitting for good. · Care for your mental health. It is easy to get weighed down by worry and stress. Learn strategies to manage stress, like deep breathing and mindfulness, and stay connected with your family and community. If you find you often feel sad or hopeless, talk with your doctor. Treatment can help. · Talk to your doctor about whether you have any risk factors for sexually transmitted infections (STIs). You can help prevent STIs if you wait to have sex with a new partner (or partners) until you've each been tested for STIs. It also helps if you use condoms (male or female condoms) and if you limit your sex partners to one person who only has sex with you. Vaccines are available for some STIs. · If you think you may have a problem with alcohol or drug use, talk to your doctor. This includes prescription medicines (such as amphetamines and opioids) and illegal drugs (such as cocaine and methamphetamine). Your doctor can help you figure out what type of treatment is best for you. · Protect your skin from too much sun. When you're outdoors from 10 a.m. to 4 p.m., stay in the shade or cover up with clothing and a hat with a wide brim. Wear sunglasses that block UV rays. Even when it's cloudy, put broad-spectrum sunscreen (SPF 30 or higher) on any exposed skin. · See a dentist one or two times a year for checkups and to have your teeth cleaned. · Wear a seat belt in the car. When should you call for help? Watch closely for changes in your health, and be sure to contact your doctor if you have any problems or symptoms that concern you. Where can you learn more? Go to http://www.gray.com/  Enter [id number] in the search box to learn more about \"Well Visit, Over 65: Care Instructions. \"  Current as of: October 6, 2021               Content Version: 13.2  © 8963-5273 [employer]. Care instructions adapted under license by [employer] (which disclaims liability or warranty for this information).  If you have questions about a medical condition or this instruction, always ask your healthcare professional. [address] any warranty or liability for your use of this information.

## 2022-04-14 ENCOUNTER — DOCUMENTATION ONLY (OUTPATIENT)
Dept: NEUROLOGY | Age: 72
End: 2022-04-14

## 2022-04-15 ENCOUNTER — TELEPHONE (OUTPATIENT)
Dept: PRIMARY CARE CLINIC | Age: 72
End: 2022-04-15

## 2022-04-15 NOTE — TELEPHONE ENCOUNTER
[facility] calling to get order sign and fax back to them.  Did express to the rep on the phone to refax the information but she said she already sent it she just needed it sign and fax back [phone]

## 2022-04-26 ENCOUNTER — TELEPHONE (OUTPATIENT)
Dept: PRIMARY CARE CLINIC | Age: 72
End: 2022-04-26

## 2022-04-26 NOTE — TELEPHONE ENCOUNTER
----- Message from Jed Velasquez sent at 4/26/2022  1:46 PM EDT -----  Subject: Message to Provider    QUESTIONS  Information for Provider? refusal of n/t ; patient was calling in for   dizziness and light headiness pt has been seen for this before and states   that she is still experiening it when offered n/t pt states that its still   the same but not worse so pt was instructed to give a call if symptoms   become worse ; pt has requested a referral for neurology for eliz on may   3rd pt upcoming eliz with pcp is 6/10/2022  ---------------------------------------------------------------------------  --------------  CALL BACK INFO  What is the best way for the office to contact you? OK to leave message on   voicemail  Preferred Call Back Phone Number? [phone]  ---------------------------------------------------------------------------  --------------  SCRIPT ANSWERS  Relationship to Patient?  Self
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
## 2022-04-29 ENCOUNTER — VIRTUAL VISIT (OUTPATIENT)
Dept: PRIMARY CARE CLINIC | Age: 72
End: 2022-04-29
Payer: MEDICARE

## 2022-04-29 DIAGNOSIS — Z87.820 HISTORY OF MULTIPLE CONCUSSIONS: ICD-10-CM

## 2022-04-29 DIAGNOSIS — H81.10 BENIGN PAROXYSMAL POSITIONAL VERTIGO, UNSPECIFIED LATERALITY: ICD-10-CM

## 2022-04-29 DIAGNOSIS — R41.0 INTERMITTENT CONFUSION: ICD-10-CM

## 2022-04-29 DIAGNOSIS — H55.00 NYSTAGMUS: Primary | ICD-10-CM

## 2022-04-29 DIAGNOSIS — R26.89 BALANCE PROBLEM: ICD-10-CM

## 2022-04-29 DIAGNOSIS — H66.003 NON-RECURRENT ACUTE SUPPURATIVE OTITIS MEDIA OF BOTH EARS WITHOUT SPONTANEOUS RUPTURE OF TYMPANIC MEMBRANES: ICD-10-CM

## 2022-04-29 PROCEDURE — 99214 OFFICE O/P EST MOD 30 MIN: CPT | Performed by: NURSE PRACTITIONER

## 2022-04-29 RX ORDER — BISMUTH SUBSALICYLATE 262 MG
1 TABLET,CHEWABLE ORAL DAILY
COMMUNITY

## 2022-04-29 NOTE — PROGRESS NOTES
Chief Complaint   Patient presents with    Other     to brain injury medicine- dr. Maria De Jesus Gutierrez Maintenance Due   Topic    Hepatitis C Screening     COVID-19 Vaccine (1)    DTaP/Tdap/Td series (1 - Tdap)    Colorectal Cancer Screening Combo     Shingrix Vaccine Age 50> (1 of 2)    Pneumococcal 65+ years (1 - PCV)    Medicare Yearly Exam         1. Have you been to the ER, urgent care clinic since your last visit? Hospitalized since your last visit? No    2. Have you seen or consulted any other health care providers outside of the [address] [place] [address] since your last visit? Include any pap smears or colon screening. No    There were no vitals taken for this visit.

## 2022-04-29 NOTE — PROGRESS NOTES
[facility]   [address], [address] [facility]  P: [phone]  F: [phone]    SUBJECTIVE   HPI:     Ronel Bojorquez is a 74 y.o. female who is seen over telehealth for Other (to brain injury medicine- dr. Brian Jarrell ). Patient was seen by me on 03/17 for multiple complaints. During the visit, I prescribed her Doxycycline for Acute Otitis Media. However, she states that it \"did not help\". She went to [facility], where \"multiple tests were performed and they could not find a dx for me\". Also, I referred her to PT for BPPV and Opthalmology for Nystagmus. She has not gone to PT and she went to Opthalmology, but Florentino Boucher could not find the cause of my eye problems. She then began to do her research and found Dr. Michelle Elizalde, who specializes in Neurorehabilitation. She is a requesting that a referral be sent over to him. She is scheduled to see him on Tuesday. Over the last few months, the patient has noticed that she is having daily intermittent confusion along with balance problems/dizziness. She attributes it to frequent head injuries. She has had \"3-6 concussions throughout her life\".      Patient Active Problem List    Diagnosis    Obesity, morbid ([address]    Carpal tunnel syndrome, bilateral upper limbs    Cervical radiculopathy due to degenerative joint disease of spine    Multinodular goiter    Hypothyroidism (acquired)    Anxiety disorder    Unspecified vitamin D deficiency    Supraventricular tachycardia, paroxysmal (HCC)    Fibromyalgia    Chest pain, unspecified    Iatrogenic hyperthyroidism    Skin cancer    Chronic fatigue syndrome    Obesity, unspecified    Palpitations    Impaired glucose tolerance          Past Medical History:   Diagnosis Date    BPPV (benign paroxysmal positional vertigo)     Chronic fatigue syndrome 3/16/2012    Fibromyalgia     BRAVO on CPAP     BiPAP    Other dyspnea and respiratory abnormality     Palpitations     Prediabetes     Skin cancer 3/16/2012    Unspecified hypothyroidism     goiter    Vitamin D deficiency      Past Surgical History:   Procedure Laterality Date    HX BREAST LUMPECTOMY  7/2013    right breast    HX TONSILLECTOMY      HX UROLOGICAL      urethral opening was widened     Social History     Socioeconomic History    Marital status:      Spouse name: Not on file    Number of children: Not on file    Years of education: Not on file    Highest education level: Not on file   Occupational History    Not on file   Tobacco Use    Smoking status: Never Smoker    Smokeless tobacco: Never Used   Vaping Use    Vaping Use: Never used   Substance and Sexual Activity    Alcohol use: Yes     Alcohol/week: 1.0 standard drink     Types: 1 Glasses of wine per week     Comment: Rare 0.5 glass of wine    Drug use: No    Sexual activity: Not Currently     Partners: Male   Other Topics Concern    Not on file   Social History Narrative    Lives in [facility] with . Used to work as a  for [employer]. Social Determinants of Health     Financial Resource Strain:     Difficulty of Paying Living Expenses: Not on file   Food Insecurity:     Worried About Running Out of Food in the Last Year: Not on file    Jeff of Food in the Last Year: Not on file   Transportation Needs:     Lack of Transportation (Medical): Not on file    Lack of Transportation (Non-Medical):  Not on file   Physical Activity:     Days of Exercise per Week: Not on file    Minutes of Exercise per Session: Not on file   Stress:     Feeling of Stress : Not on file   Social Connections:     Frequency of Communication with Friends and Family: Not on file    Frequency of Social Gatherings with Friends and Family: Not on file    Attends Temple Services: Not on file    Active Member of Clubs or Organizations: Not on file    Attends Club or Organization Meetings: Not on file    Marital Status: Not on file   Intimate Partner Violence:     Fear of Current or Ex-Partner: Not on file    Emotionally Abused: Not on file    Physically Abused: Not on file    Sexually Abused: Not on file   Housing Stability:     Unable to Pay for Housing in the Last Year: Not on file    Number of [place] in the Last Year: Not on file    Unstable Housing in the Last Year: Not on file     Family History   Problem Relation Age of Onset    Coronary Art Dis Mother     Thyroid Disease Mother     Diabetes Mother     Cancer Mother         Multiple Myeloma    Cancer Father         Colon cancer    OSTEOARTHRITIS Father     Heart Disease Father     High Cholesterol Brother     Thyroid Disease Brother         thyroid cancer    Cancer Brother         Thyroid and Skin    Diabetes Maternal Aunt     Hypertension Other     Heart Disease Other     Depression Other     Anxiety Other     Cancer Sister         Skin    Cancer Brother         Multiple Myeloma and skin    Cancer Brother         Skin       There is no immunization history on file for this patient. Allergies   Allergen Reactions    Lidocaine Other (comments)     Body Shakes    Adhes.  Band-Tape-Benzalkonium Swelling    Adhesive Swelling    Dairy Aid [Lactase] Unknown (comments)    Gluten Diarrhea     \"brain fog\", aching    Iodinated Contrast Media Other (comments)     Gets dizzy and shakey    Milk Containing Products Other (comments)     Aches and stomach cramps    Motrin [Ibuprofen] Unknown (comments)    Other Medication Other (comments)     Super sensitive to all medication    Soy Unknown (comments)    Sulfa (Sulfonamide Antibiotics) Unable to Obtain    Trulicity [Dulaglutide] Nausea Only     Stomach discomfort    Wheat Unknown (comments)       Orders Only on 03/17/2022   Component Date Value Ref Range Status    Hemoglobin A1c 03/17/2022 5.7* 4.0 - 5.6 % Final    Comment: NEW METHOD PLEASE NOTE NEW REFERENCE RANGE  (NOTE)  HbA1C Interpretive Ranges  <5.7              Normal  5.7 - 6.4         Consider Prediabetes  >6.5              Consider Diabetes      Est. average glucose 03/17/2022 117  mg/dL Final    T4, Free 03/17/2022 1.0  0.8 - 1.5 NG/DL Final    TSH 03/17/2022 1.45  0.36 - 3.74 uIU/mL Final    Comment:   Due to TSH heterogeneity, both structurally and degree of glycosylation,  monoclonal antibodies used in the TSH assay may not accurately quantitate TSH. Therefore, this result should be correlated with clinical findings as well as  with other assessments of thyroid function, e.g., free T4, free T3.  Sodium 03/17/2022 141  136 - 145 mmol/L Final    Potassium 03/17/2022 4.6  3.5 - 5.1 mmol/L Final    Chloride 03/17/2022 109* 97 - 108 mmol/L Final    CO2 03/17/2022 30  21 - 32 mmol/L Final    Anion gap 03/17/2022 2* 5 - 15 mmol/L Final    Glucose 03/17/2022 94  65 - 100 mg/dL Final    BUN 03/17/2022 20  6 - 20 MG/DL Final    Creatinine 03/17/2022 0.75  0.55 - 1.02 MG/DL Final    BUN/Creatinine ratio 03/17/2022 27* 12 - 20   Final    GFR est AA 03/17/2022 >60  >60 ml/min/1.73m2 Final    GFR est non-AA 03/17/2022 >60  >60 ml/min/1.73m2 Final    Comment: Estimated GFR is calculated using the IDMS-traceable Modification of Diet in  Renal Disease (MDRD) Study equation, reported for both  Americans  (GFRAA) and non- Americans (GFRNA), and normalized to 1.73m2 body  surface area. The physician must decide which value applies to the patient.  Calcium 03/17/2022 9.7  8.5 - 10.1 MG/DL Final    Bilirubin, total 03/17/2022 0.4  0.2 - 1.0 MG/DL Final    ALT (SGPT) 03/17/2022 94* 12 - 78 U/L Final    AST (SGOT) 03/17/2022 47* 15 - 37 U/L Final    Alk.  phosphatase 03/17/2022 70  45 - 117 U/L Final    Protein, total 03/17/2022 7.2  6.4 - 8.2 g/dL Final    Albumin 03/17/2022 4.0  3.5 - 5.0 g/dL Final    Globulin 03/17/2022 3.2  2.0 - 4.0 g/dL Final    A-G Ratio 03/17/2022 1.3  1.1 - 2.2   Final    VEGF, plasma 03/17/2022 81  0 - 115 pg/mL Final    Comment: (NOTE)  [employer] Quantikine Enzyme Immunoassay (EIA)  Results of this test are labeled for research purposes only by the  assay's . The performance characteristics of this assay  have not been established by the . The result should  not be used for treatment or for diagnostic purposes without  confirmation of the diagnosis by another medically established  diagnostic product or procedure. The performance characteristics were  determined by [facility].  Values obtained with different assay methods or kits cannot be used  interchangeably. Results cannot be interpreted as absolute evidence  of the presence or absence of malignant disease. Performed At: [address] [id number]  Ramez Rodarte MD DV:[id number]         [facility]  Outside Information       Breast DM Screening Kal W/ Tiago    Anatomical Region Laterality Modality  Breast bilateral Mammography    Impression  Performed by Lele Flores  No mammographic evidence of malignancy. BI-RADS Category:   Overall Assessment: 2 - Benign     RECOMMENDATION:   Annual Screening Mammogram is recommended for both breasts. The results were communicated to the patient in writing. Narrative  Performed by [facility] RADIOLOGY  RESULT:   Breast DM Screening Kal W/ Tiago     HISTORY:   Patient is a 66 y.o. and is seen for screening. The patient has not had a   COVID-19 vaccine.    Medical history includes breast cancer (Lumpectomy, no treatment with RT   or chemo). Procedure history includes right lumpectomy and breast biopsy. Family medical history includes breast cancer in 2 relatives (maternal   aunt, maternal grandmother) and ovarian cancer in maternal aunt. Hormone   history includes hormone replacement therapy (cream...approx. 1 month). FILMS COMPARED:   Prior Images available and compared.      FINDINGS: Tomosynthesis projections and reconstructed 2D images. Computer-aided   detection was utilized by the radiologist in the interpretation of this   examination. Mammogram breast composition: There are scattered areas of fibroglandular   density. There are stable postlumpectomy changes in the right breast with surgical   clips. There are scattered stable areas of asymmetry, most compatible with   normal variant. There is no evidence of suspicious masses, microcalcifications or   architectural distortion. Exam End: 02/10/22  2:44 PM Last Resulted: 02/10/22  2:50 PM  Received From: [facility]  Result Received: 02/28/22 12:37 PM   View Encounter      Current Outpatient Medications   Medication Sig Dispense Refill    OTHER tolnaftate lotion- at night      bismuth subsalicylate (PEPTO-BISMOL MAXIMUM STRENGTH) 525 mg/15 mL susp Take 5 mL by mouth as needed.  OTHER as needed. bio-gest      OTHER as needed. D-hist querction      diphenhydramine HCl (BENADRYL ALLERGY PO) Take 12.5 mg by mouth nightly. For allergy and sleep      multivitamin (ONE A DAY) tablet Take 1 Tablet by mouth daily.  OTHER nightly. [employer] (K-2)      Levothyroxine (Tirosint) 75 mcg cap Take 75 mcg by mouth daily. 90 Capsule 0    thyroid, Pork, ([place] Thyroid) 30 mg tablet Take 30 mg by mouth two (2) times a day. Pt takes on pill at 8 am and the second at 1pm      pseudoephedrine HCl (SUDAFED PO) Take 10 mg by mouth as needed.  OTHER uritrax      BABY ASPIRIN PO Take 81 mg by mouth. Pt states she takes one to two pills      OTHER cbd oil 6 drops twice a day      OTHER Cbd cream on knees twice a day for knee pain      VITAMIN A PO Take 3,000 mg by mouth daily.  magnesium gluconate 500 mg (27 mg elemental magnesium) tab tablet magnesium 500 mg tablet   Take by oral route.  zinc gluconate 50 mg tablet Take 50 mg by mouth daily.  [place] Thyroid 15 mg tablet Take 15 mg by mouth daily.  Pt takes before breakfast      lysine (L-LYSINE) 500 mg tab tablet lysine 500 mg tablet   Take by oral route.  OTHER Take 70 mg by mouth daily. Indications: Progestrone      cholecalciferol, VITAMIN D3, (VITAMIN D3) 5,000 unit tab tablet 45408 iu (in drop form), K2           The past medical history, past surgical history, and family history were reviewed and updated in the medical record. Lab values/Imagining were reviewed. The medications were reviewed and updated in the medical record. Immunizations were reviewed and updated in the medical record. All relevant preventative screenings reviewed and updated in the medical record. REVIEW OF SYSTEMS   Review of Systems   Constitutional: Positive for malaise/fatigue. Negative for chills, diaphoresis, fever and weight loss. HENT: Negative for ear pain, hearing loss and tinnitus. Eyes: Positive for blurred vision. Negative for double vision, photophobia, pain, discharge and redness. Respiratory: Negative for shortness of breath and wheezing. Cardiovascular: Negative for chest pain and palpitations. Musculoskeletal: Negative for falls. Neurological: Positive for dizziness, loss of consciousness and weakness. Negative for tingling, tremors, sensory change, speech change, focal weakness, seizures and headaches. Psychiatric/Behavioral: Positive for memory loss. Negative for depression, hallucinations and suicidal ideas. The patient is not nervous/anxious and does not have insomnia. PHYSICAL EXAM   NO VITALS WERE TAKEN FOR THIS VISIT  Telehealth encounter only, no physical exam performed. ASSESSMENT/ PLAN   Below is the assessment and plan developed based on review of pertinent history, physical exam, labs, studies, and medications. 1. Nystagmus  -     REFERRAL TO NEUROLOGY  2. Benign paroxysmal positional vertigo, unspecified laterality  3.  History of multiple concussions  -     REFERRAL TO NEUROLOGY  4. Balance problem  -     REFERRAL TO NEUROLOGY  5. Intermittent confusion  -     REFERRAL TO NEUROLOGY  6. Non-recurrent acute suppurative otitis media of both ears without spontaneous rupture of tympanic membranes    I concur with the patient that her symptoms discussed above may be related to repeat trauma to the head. Additional Neuro testing would be beneficial.     Follow-up and Dispositions    · Return in about 6 weeks (around 6/10/2022) for Management of multiple co-morbidities . Consent:  She and/or her healthcare decision maker is aware that this patient-initiated Telehealth encounter is a billable service, with coverage as determined by her insurance carrier. She is aware that she may receive a bill and has provided verbal consent to proceed: Yes    This virtual visit was conducted via [employer]. Pursuant to the emergency declaration under the [facility], [facility] waiver authority and the [employer] and [employer] Act, this Virtual  Visit was conducted to reduce the patient's risk of exposure to COVID-19 and provide continuity of care for an established patient. Services were provided through a video synchronous discussion virtually to substitute for in-person clinic visit. Due to this being a TeleHealth evaluation, many elements of the physical examination are unable to be assessed. Total Time: minutes: 11-20 minutes. Disclaimer:  Advised patient to call back or return to office if symptoms worsen/change/persist.  Discussed expected course/resolution/complications of diagnosis in detail with patient. Medication risks/benefits/alternatives discussed with patient. Patient was given an after visit summary which includes diagnoses, current medications, & vitals. Discussed patient instructions and advised to read to all patient instructions regarding care. Patient expressed understanding with the diagnosis and plan.        I was in the office while conducting this encounter. Ced Dupont is a 74 y.o. female who was evaluated by an audio-video encounter for concerns as above. Patient identification was verified prior to start of the visit. A caregiver was present when appropriate. Due to this being a TeleHealth encounter (During [facility] public health emergency), evaluation of the following organ systems was limited: Vitals/Constitutional/EENT/Resp/CV/GI//MS/Neuro/Skin/Heme-Lymph-Imm. Pursuant to the emergency declaration under the [address] [place] [address], [facility] waiver authority and the [employer] and [employer] Act, this Virtual Visit was conducted, with patient's (and/or legal guardian's) consent, to reduce the patient's risk of exposure to COVID-19 and provide necessary medical care. Services were provided through a synchronous discussion virtually to substitute for in-person clinic visit. I was in the office. The patient was at home.     Maria D Mcnulty NP  4/29/2022      (This document has been electronically signed)

## 2022-05-11 ENCOUNTER — TRANSCRIBE ORDER (OUTPATIENT)
Dept: SCHEDULING | Age: 72
End: 2022-05-11

## 2022-05-12 ENCOUNTER — TRANSCRIBE ORDER (OUTPATIENT)
Dept: SCHEDULING | Age: 72
End: 2022-05-12

## 2022-05-12 DIAGNOSIS — S06.0X1S CONCUSSION WITH LOSS OF CONSCIOUSNESS OF 30 MINUTES OR LESS, SEQUELA (HCC): Primary | ICD-10-CM

## 2022-05-23 ENCOUNTER — HOSPITAL ENCOUNTER (OUTPATIENT)
Dept: MRI IMAGING | Age: 72
Discharge: HOME | End: 2022-05-23
Attending: FAMILY MEDICINE
Payer: MEDICARE

## 2022-05-23 DIAGNOSIS — S06.0X1S CONCUSSION WITH LOSS OF CONSCIOUSNESS OF 30 MINUTES OR LESS, SEQUELA (HCC): ICD-10-CM

## 2022-05-23 PROCEDURE — 70551 MRI BRAIN STEM W/O DYE: CPT

## 2022-05-24 ENCOUNTER — OFFICE VISIT (OUTPATIENT)
Dept: ORTHOPEDIC SURGERY | Age: 72
End: 2022-05-24
Payer: MEDICARE

## 2022-05-24 VITALS
HEIGHT: 66 IN | TEMPERATURE: 97.2 F | OXYGEN SATURATION: 97 % | SYSTOLIC BLOOD PRESSURE: 135 MMHG | HEART RATE: 85 BPM | BODY MASS INDEX: 39.86 KG/M2 | DIASTOLIC BLOOD PRESSURE: 74 MMHG | WEIGHT: 248 LBS

## 2022-05-24 DIAGNOSIS — M17.0 BILATERAL PRIMARY OSTEOARTHRITIS OF KNEE: Primary | ICD-10-CM

## 2022-05-24 PROCEDURE — 3017F COLORECTAL CA SCREEN DOC REV: CPT | Performed by: ORTHOPAEDIC SURGERY

## 2022-05-24 PROCEDURE — G9899 SCRN MAM PERF RSLTS DOC: HCPCS | Performed by: ORTHOPAEDIC SURGERY

## 2022-05-24 PROCEDURE — G8427 DOCREV CUR MEDS BY ELIG CLIN: HCPCS | Performed by: ORTHOPAEDIC SURGERY

## 2022-05-24 PROCEDURE — 1090F PRES/ABSN URINE INCON ASSESS: CPT | Performed by: ORTHOPAEDIC SURGERY

## 2022-05-24 PROCEDURE — G8417 CALC BMI ABV UP PARAM F/U: HCPCS | Performed by: ORTHOPAEDIC SURGERY

## 2022-05-24 PROCEDURE — 99213 OFFICE O/P EST LOW 20 MIN: CPT | Performed by: ORTHOPAEDIC SURGERY

## 2022-05-24 PROCEDURE — G8399 PT W/DXA RESULTS DOCUMENT: HCPCS | Performed by: ORTHOPAEDIC SURGERY

## 2022-05-24 PROCEDURE — G8536 NO DOC ELDER MAL SCRN: HCPCS | Performed by: ORTHOPAEDIC SURGERY

## 2022-05-24 PROCEDURE — 1101F PT FALLS ASSESS-DOCD LE1/YR: CPT | Performed by: ORTHOPAEDIC SURGERY

## 2022-05-24 PROCEDURE — G8510 SCR DEP NEG, NO PLAN REQD: HCPCS | Performed by: ORTHOPAEDIC SURGERY

## 2022-05-24 NOTE — PROGRESS NOTES
5/24/2022    Chief Complaint: Right knee pain    Assessment: Osteoarthritis right knee    Plan: This patient and I did discuss the many options in treating knee osteoarthritis. We did discuss that we could continue to seek out nonoperative modalities, such as: NSAIDs, oral and topical analgesics, knee injections, knee braces, physical therapy, stretching, strengthening, and weight loss strategies, activity modification, ambulatory assistive devices. The patient stated their understanding with this, but would like to proceed with surgical management in the form of a total knee arthroplasty. We did discuss the risks of surgery which include but are not limited to infection, nerve or blood vessel damage, failure of fixation, failure of any possible implant, need for reoperation, postoperative pain and swelling, intra-or postoperative fracture, postoperative dislocation, leg length inequality, need for reoperation, implant failure, death, disability, organ dysfunction, wound healing issues, DVT, PE, and the need for further procedures. The patient did freely state their understanding and satisfaction with our discussion. We will proceed after medical clearances. The patient was counseled at length about the risks of amisha Covid-19 during their perioperative period and any recovery window from their procedure. The patient was made aware that amisha Covid-19  may worsen their prognosis for recovering from their procedure and lend to a higher morbidity and/or mortality risk. All material risks, benefits, and reasonable alternatives including postponing the procedure were discussed. The patient does  wish to proceed with the procedure at this time. HPI: This is a 74 y.o. female who complains of right knee pain. Onset was gradual.  The patient has had activity dependent pain for years.     The patient has tried activity modification, physical therapy exercises, injections have failed to provide relief. The pain is in the knee, it is severe in intensity. The patient feels unstable with the knee, fears falling, and has significant limitation with activities of daily living, recreation, and walks with a limp. Past Medical History:   Diagnosis Date    BPPV (benign paroxysmal positional vertigo)     Chronic fatigue syndrome 3/16/2012    Fibromyalgia     BRAVO on CPAP     BiPAP    Other dyspnea and respiratory abnormality     Palpitations     Prediabetes     Skin cancer 3/16/2012    Unspecified hypothyroidism     goiter    Vitamin D deficiency        Past Surgical History:   Procedure Laterality Date    HX BREAST LUMPECTOMY  7/2013    right breast    HX TONSILLECTOMY      HX UROLOGICAL      urethral opening was widened       Current Outpatient Medications on File Prior to Visit   Medication Sig Dispense Refill    OTHER tolnaftate lotion- at night      bismuth subsalicylate (PEPTO-BISMOL MAXIMUM STRENGTH) 525 mg/15 mL susp Take 5 mL by mouth as needed.  OTHER as needed. bio-gest      OTHER as needed. D-hist querction      multivitamin (ONE A DAY) tablet Take 1 Tablet by mouth daily.  Levothyroxine (Tirosint) 75 mcg cap Take 75 mcg by mouth daily. 90 Capsule 0    thyroid, Pork, (Streetman Thyroid) 30 mg tablet Take 30 mg by mouth two (2) times a day. Pt takes on pill at 8 am and the second at 1pm      OTHER uritrax      BABY ASPIRIN PO Take 81 mg by mouth. Pt states she takes one to two pills      OTHER cbd oil 6 drops twice a day      OTHER Cbd cream on knees twice a day for knee pain      VITAMIN A PO Take 3,000 mg by mouth daily.  magnesium gluconate 500 mg (27 mg elemental magnesium) tab tablet magnesium 500 mg tablet   Take by oral route.  Streetman Thyroid 15 mg tablet Take 15 mg by mouth daily. Pt takes before breakfast      OTHER Take 70 mg by mouth daily.  Indications: Progestrone      cholecalciferol, VITAMIN D3, (VITAMIN D3) 5,000 unit tab tablet 55832 iu (in drop form), K2      diphenhydramine HCl (BENADRYL ALLERGY PO) Take 12.5 mg by mouth nightly. For allergy and sleep      OTHER nightly. [employer] (K-2)      pseudoephedrine HCl (SUDAFED PO) Take 10 mg by mouth as needed.  zinc gluconate 50 mg tablet Take 50 mg by mouth daily.  lysine (L-LYSINE) 500 mg tab tablet lysine 500 mg tablet   Take by oral route. No current facility-administered medications on file prior to visit. Allergies   Allergen Reactions    Lidocaine Other (comments)     Body Shakes    Adhes.  Band-Tape-Benzalkonium Swelling    Adhesive Swelling    Dairy Aid [Lactase] Unknown (comments)    Gluten Diarrhea     \"brain fog\", aching    Iodinated Contrast Media Other (comments)     Gets dizzy and shakey    Milk Containing Products Other (comments)     Aches and stomach cramps    Motrin [Ibuprofen] Unknown (comments)    Other Medication Other (comments)     Super sensitive to all medication    Soy Unknown (comments)    Sulfa (Sulfonamide Antibiotics) Unable to Obtain    Trulicity [Dulaglutide] Nausea Only     Stomach discomfort    Wheat Unknown (comments)       Family History   Problem Relation Age of Onset    Coronary Art Dis Mother     Thyroid Disease Mother     Diabetes Mother     Cancer Mother         Multiple Myeloma    Cancer Father         Colon cancer    OSTEOARTHRITIS Father     Heart Disease Father     High Cholesterol Brother     Thyroid Disease Brother         thyroid cancer    Cancer Brother         Thyroid and Skin    Diabetes Maternal Aunt     Hypertension Other     Heart Disease Other     Depression Other     Anxiety Other     Cancer Sister         Skin    Cancer Brother         Multiple Myeloma and skin    Cancer Brother         Skin       Social History     Socioeconomic History    Marital status:    Tobacco Use    Smoking status: Never Smoker    Smokeless tobacco: Never Used   Vaping Use    Vaping Use: Never used Substance and Sexual Activity    Alcohol use: Yes     Alcohol/week: 1.0 standard drink     Types: 1 Glasses of wine per week     Comment: Rare 0.5 glass of wine    Drug use: No    Sexual activity: Not Currently     Partners: Male   Social History Narrative    Lives in [facility] with . Used to work as a  for [employer]. Review of Systems:       General: Denies headache, lethargy, fever, weight loss  Ears/Nose/Throat: Denies ear discharge, drainage, nosebleeds, hoarse voice, dental problems  Cardiovascular: Denies chest pain, shortness of breath  Lungs: Denies chest pain, breathing problems, wheezing, pneumonia  Stomach: Denies stomach pain, heartburn, constipation, irritable bowel  Skin: Denies rash, sores, open wounds  Musculoskeletal: Admits to knee pain  Genitourinary: Denies dysuria, hematuria, polyuria  Gastrointestinal: Denies constipation, obstipation, diarrhea  Neurological: Denies changes in sight, smell, hearing, taste, seizures. Denies loss of consciousness.   Psychiatric: Denies depression, sleep pattern changes, anxiety, change in personality  Endocrine: Denies mood swings, heat or cold intolerance  Hematologic/Lymphatic: Denies anemia, purpura, petechia  Allergic/Immunologic: Denies swelling of throat, pain or swelling at lymph nodes      Physical Examination:    Visit Vitals  /74 (BP 1 Location: Left upper arm, BP Patient Position: Sitting, BP Cuff Size: Large adult)   Pulse 85   Temp 97.2 °F (36.2 °C) (Tympanic)   Ht 5' 5.5\" (1.664 m)   Wt 248 lb (112.5 kg)   SpO2 97%   BMI 40.64 kg/m²        General: AOX3, no apparent distress  Psychiatric: mood and affect appropriate  Lungs: breathing is symmetric and unlabored bilaterally  Heart: regular rate and rhythm  Abdomen: no guarding  Head: normocephalic, atraumatic  Skin: No significant abnormalities, good turgor  Sensation intact to light touch: L1-S1 dermatomes  Muscular exam: 5/5 strength in all major muscle groups unless noted in specialty exam.    Extremities:      Left upper extremity: Full active and passive range of motion without pain, deformity, no open wound, strength 5/5 in all major muscle groups. Right upper extremity: Full active and passive range of motion without pain, deformity, no open wound, strength 5/5 in all major muscle groups. Left lower extremity: Full active and passive range of motion without pain, deformity, no open wound, strength 5/5 in all major muscle groups. Right lower extremity:  No deformity is noted. Range of motion of the knee is 5-110. Ligamentous testing of the knee indicates stability of the the MCL, LCL, PCL, and ACL. Lachman's, anterior and posterior drawer tests are specifically negative. Medial joint line tenderness to palpation. Popliteal area is unremarkable. 1+  for effusion. + patellar crepitus. Patella tracks centrally + grind test.  Pivot shift is negative. Strength testing is indicative of 5/5 strength at hip flexion, extension, knee flexion and extension, tibialis anterior, EHL, and FHL. Sensation is intact to light touch in the L1-S1 dermatomes. Capillary refill is less than 2 seconds in the toes. Diagnostics:    Pertinent Xrays:  Xrays are available of the right knee. Bone on bone osteoarthritic changes of the right knee, osteophytes and subchondral sclerosis is noted. Ms. Clara Reyes has a reminder for a \"due or due soon\" health maintenance. I have asked that she contact her primary care provider for follow-up on this health maintenance.

## 2022-05-24 NOTE — PROGRESS NOTES
Identified pt with two pt identifiers (name and ). Reviewed chart in preparation for visit and have obtained necessary documentation. Alesha Rodriguez is a 74 y.o. female  Chief Complaint   Patient presents with    Follow-up     Bilateral knee     Visit Vitals  /74 (BP 1 Location: Left upper arm, BP Patient Position: Sitting, BP Cuff Size: Large adult)   Pulse 85   Temp 97.2 °F (36.2 °C) (Tympanic)   Ht 5' 5.5\" (1.664 m)   Wt 248 lb (112.5 kg)   SpO2 97%   BMI 40.64 kg/m²     1. Have you been to the ER, urgent care clinic since your last visit? Hospitalized since your last visit? No    2. Have you seen or consulted any other health care providers outside of the [address] [place] [address] since your last visit? Include any pap smears or colon screening.  No

## 2022-05-24 NOTE — LETTER
5/24/2022    Patient: Jacoby Ascencio   YOB: 1950   Date of Visit: 5/24/2022     Jessenia Patton [facility] [address] [place] [address]    Dear Mary Kate Velázquez DO,      Thank you for referring Ms. Katie Rob to [facility] for evaluation. My notes for this consultation are attached. If you have questions, please do not hesitate to call me. I look forward to following your patient along with you.       Sincerely,    Zaid Walters DO

## 2022-05-27 ENCOUNTER — TELEPHONE (OUTPATIENT)
Dept: ORTHOPEDIC SURGERY | Age: 72
End: 2022-05-27

## 2022-05-27 NOTE — TELEPHONE ENCOUNTER
Contacted patient to schedule surgery. Scheduled for 7/12/22. Advised patient that clearances from Dr. Josie Ku would be required, and would need to be received no less that 2 business days before surgery. Patient advised to contact the office(s) to make pre op appts as soon as possible. Patient verbalized understanding and was encouraged to contact our office with any questions or concerns obtaining to upcoming surgery or required clearances. Clearance letters faxed to [phone]. Confirmation was received.    Pt will be at home with her  for post op should she need anything.         ----- Message from Markell Dias DO sent at 5/24/2022  2:24 PM EDT -----  Diagnosis: Unilateral Primary Osteoarthritis, right knee M17.11  Procedure: Right total knee arthroplasty   CPT: 87641  Operative minutes: 120  Inpatient  Location: [facility] Main OR  PAT: Yes  Class: Yes  Special Equipment: Regular table, Budny foot marc, [place] [employer], Plan for cemented TKA, foot marc for prepping  Staffing: Retractor marc  Anesthesia: General with adductor canal block

## 2022-06-10 ENCOUNTER — OFFICE VISIT (OUTPATIENT)
Dept: PRIMARY CARE CLINIC | Age: 72
End: 2022-06-10
Payer: MEDICARE

## 2022-06-10 VITALS
SYSTOLIC BLOOD PRESSURE: 114 MMHG | HEART RATE: 62 BPM | OXYGEN SATURATION: 99 % | WEIGHT: 251 LBS | DIASTOLIC BLOOD PRESSURE: 68 MMHG | TEMPERATURE: 97.8 F | BODY MASS INDEX: 40.34 KG/M2 | RESPIRATION RATE: 18 BRPM | HEIGHT: 66 IN

## 2022-06-10 DIAGNOSIS — H55.00 NYSTAGMUS: ICD-10-CM

## 2022-06-10 DIAGNOSIS — M17.0 BILATERAL PRIMARY OSTEOARTHRITIS OF KNEE: ICD-10-CM

## 2022-06-10 DIAGNOSIS — N95.9 POST MENOPAUSAL PROBLEMS: Primary | ICD-10-CM

## 2022-06-10 PROCEDURE — 99214 OFFICE O/P EST MOD 30 MIN: CPT | Performed by: FAMILY MEDICINE

## 2022-06-10 PROCEDURE — 1123F ACP DISCUSS/DSCN MKR DOCD: CPT | Performed by: FAMILY MEDICINE

## 2022-06-10 PROCEDURE — G8427 DOCREV CUR MEDS BY ELIG CLIN: HCPCS | Performed by: FAMILY MEDICINE

## 2022-06-10 PROCEDURE — G8417 CALC BMI ABV UP PARAM F/U: HCPCS | Performed by: FAMILY MEDICINE

## 2022-06-10 PROCEDURE — G8399 PT W/DXA RESULTS DOCUMENT: HCPCS | Performed by: FAMILY MEDICINE

## 2022-06-10 PROCEDURE — 3017F COLORECTAL CA SCREEN DOC REV: CPT | Performed by: FAMILY MEDICINE

## 2022-06-10 PROCEDURE — G8510 SCR DEP NEG, NO PLAN REQD: HCPCS | Performed by: FAMILY MEDICINE

## 2022-06-10 PROCEDURE — G8536 NO DOC ELDER MAL SCRN: HCPCS | Performed by: FAMILY MEDICINE

## 2022-06-10 PROCEDURE — G9899 SCRN MAM PERF RSLTS DOC: HCPCS | Performed by: FAMILY MEDICINE

## 2022-06-10 PROCEDURE — 1090F PRES/ABSN URINE INCON ASSESS: CPT | Performed by: FAMILY MEDICINE

## 2022-06-10 PROCEDURE — 1101F PT FALLS ASSESS-DOCD LE1/YR: CPT | Performed by: FAMILY MEDICINE

## 2022-06-10 RX ORDER — FAMOTIDINE 40 MG/1
40 TABLET, FILM COATED ORAL DAILY
COMMUNITY
End: 2022-08-16 | Stop reason: ALTCHOICE

## 2022-06-10 NOTE — PROGRESS NOTES
Identified pt with two pt identifiers(name and ). Chief Complaint   Patient presents with    Follow-up        3 most recent PHQ Screens 6/10/2022   Little interest or pleasure in doing things Not at all   Feeling down, depressed, irritable, or hopeless Several days   Total Score PHQ 2 1        Vitals:    06/10/22 1145   BP: 114/68   Pulse: 62   Resp: 18   Temp: 97.8 °F (36.6 °C)   TempSrc: Temporal   SpO2: 99%   Weight: 251 lb (113.9 kg)   Height: 5' 5.5\" (1.664 m)       Health Maintenance Due   Topic Date Due    Hepatitis C Screening  Never done    COVID-19 Vaccine (1) Never done    DTaP/Tdap/Td series (1 - Tdap) Never done    Colorectal Cancer Screening Combo  Never done    Shingrix Vaccine Age 50> (1 of 2) Never done    Pneumococcal 65+ years (1 - PCV) Never done    Medicare Yearly Exam  Never done        1. Have you been to the ER, urgent care clinic since your last visit? Hospitalized since your last visit? No    2. Have you seen or consulted any other health care providers outside of the [address] [place] [address] since your last visit? Include any pap smears or colon screening. yes, [facility] center    3. For patients aged 39-70: Has the patient had a colonoscopy / FIT/ Cologuard? No      If the patient is female:    4. For patients aged 41-77: Has the patient had a mammogram within the past 2 years? No      5. For patients aged 21-65: Has the patient had a pap smear?  Yes - no Care Gap present

## 2022-06-10 NOTE — PATIENT INSTRUCTIONS
Menopausal Hormone Therapy (HT): Care Instructions  Overview     Hormone therapy (HT) is medicine to treat symptoms of menopause, such as hot flashes, vaginal dryness, and sleep problems. It replaces the hormones that drop at menopause. You may start to get relief from these symptoms within weeks of starting HT. Hormone therapy often contains two hormones, estrogen and progestin. HT may come in the form of a pill, patch, gel, spray, or vaginal ring. A vaginal cream, a vaginal tablet, or a vaginal ring that has a much lower dose of estrogen may be used to relieve dryness and other tissue changes in and around the vagina. HT has some risks. It may increase the chances of heart disease, breast cancer, blood clots, and stroke. Be sure to have regular checkups with your doctor when taking HT. Talk with your doctor about whether HT is right for you. Why might you take HT?  · HT reduces symptoms of menopause. These include hot flashes, mood swings, and sleep problems. · HT helps to build up the lining of the vagina and improve lubrication. This can reduce irritation. · The estrogen in HT helps to prevent thinning bones. What are the risks of taking HT? · Sometimes HT may cause vaginal bleeding, bloating, nausea, sore breasts, mood swings, and headaches. Talk to your doctor about changing the type of HT you take or lowering the dose. This may help to end these side effects. · Taking HT may slightly increase your risk for heart disease, breast cancer, blood clots, and stroke. · You should not take HT if you:  ? Could be pregnant. ? Have a personal history of pulmonary embolism, deep vein thrombosis, heart attack, or stroke. ? Have vaginal bleeding from an unknown cause. ? Have active liver disease. · Most people with a personal history of breast cancer or endometrial cancer should not take HT. But it may be an option for some. Ask your doctor. Where can you learn more?   Go to http://www.gray.com/  Enter V552 in the search box to learn more about \"Menopausal Hormone Therapy (HT): Care Instructions. \"  Current as of: November 22, 2021               Content Version: 13.2  © 4366-9054 [employer]. Care instructions adapted under license by [employer] (which disclaims liability or warranty for this information).  If you have questions about a medical condition or this instruction, always ask your healthcare professional. [address] any warranty or liability for your use of this information.

## 2022-06-10 NOTE — PROGRESS NOTES
HPI     Chief Complaint   Patient presents with    Follow-up     She is a 74 y.o. female who presents for follow up. Per the patient on May 3rd she went to a [facility] for brain biofeedback assessment for further eval after she had eye fluttering. She was told to see a concussion specialist and saw Neuropthamology Dr. Titus Alejandra who said there was nothing wrong with her eyes. She is following up with Jim Schroeder 6/15/22. She had labs that only showed mildly elevated liver enzymes. She saw Dr. Marissa Fong for her knee pain who recommended a knee replacement. She states she has now see two Orthopedist who have both recommended surgery. She is worried about her pain post op and has been doing research on specific post op reccs for patients who have fibromyalgia and wants to speak with them about how her pain will be managed before deciding who to pursue surgery with. Review of Systems   Constitutional: Negative for chills and fever. Reviewed PmHx, RxHx, FmHx, SocHx, AllgHx and updated and dated in the chart. Physical Exam:  Visit Vitals  /68 (BP 1 Location: Right arm, BP Patient Position: Sitting, BP Cuff Size: Adult)   Pulse 62   Temp 97.8 °F (36.6 °C) (Temporal)   Resp 18   Ht 5' 5.5\" (1.664 m)   Wt 251 lb (113.9 kg)   LMP  (Exact Date)   SpO2 99%   BMI 41.13 kg/m²     Physical Exam  Vitals and nursing note reviewed. Constitutional:       General: She is not in acute distress. Appearance: Normal appearance. She is not ill-appearing. Cardiovascular:      Rate and Rhythm: Normal rate and regular rhythm. Heart sounds: No murmur heard. Pulmonary:      Effort: Pulmonary effort is normal. No respiratory distress. Breath sounds: Normal breath sounds. Neurological:      Mental Status: She is alert. Psychiatric:      Comments: Speaks quickly. No results found for this or any previous visit (from the past 12 hour(s)).        Assessment / Plan Diagnoses and all orders for this visit:    1. Post menopausal problems  -     REFERRAL TO OBSTETRICS AND GYNECOLOGY    2. Nystagmus    3. Bilateral primary osteoarthritis of knee       Will try to reach out to Patricia Fishman to see what reccs they have for patient and what their plan is going forward. Need records. Recommend follow up with Ortho for knee pain. Discussed I do not typically order hormone panels but could refer to OB/ GYN for 2nd opinion. Needs to follow up for medicare wellness. I have discussed the diagnosis with the patient and the intended plan as seen in the above orders. The patient has received an after-visit summary and questions were answered concerning future plans. I have discussed medication side effects and warnings with the patient as well. I spent a total of 31 minutes in both face to face and in non face to face activities for the visit on the date of this encounter.       Jeffry Rivera, DO

## 2022-06-15 ENCOUNTER — TELEPHONE (OUTPATIENT)
Dept: ORTHOPEDIC SURGERY | Age: 72
End: 2022-06-15

## 2022-06-15 NOTE — TELEPHONE ENCOUNTER
Clarissa Merritt from [facility]    Patient has 2 different surgeons and would like to know if she should move forward with scheduling the testing or not.      [phone]

## 2022-06-16 NOTE — TELEPHONE ENCOUNTER
Returned Abdias Grimaldo phone call and spoke with [employer]. Informed her that I scheduled pt for a phone call with Dr. Joellyn Brunner on Monday the 20th to discuss her surgery and whether she's having it or not. Pt will then decide, and if she decides yes, then PAT may go ahead with scheduling her testing.

## 2022-06-20 ENCOUNTER — VIRTUAL VISIT (OUTPATIENT)
Dept: ORTHOPEDIC SURGERY | Age: 72
End: 2022-06-20
Payer: MEDICARE

## 2022-06-20 DIAGNOSIS — M79.7 FIBROMYALGIA: Primary | ICD-10-CM

## 2022-06-20 DIAGNOSIS — M17.0 BILATERAL PRIMARY OSTEOARTHRITIS OF KNEE: ICD-10-CM

## 2022-06-20 PROCEDURE — 99442 PR PHYS/QHP TELEPHONE EVALUATION 11-20 MIN: CPT | Performed by: ORTHOPAEDIC SURGERY

## 2022-06-21 NOTE — PROGRESS NOTES
6/20/2022      CC: bilateral knee pain    HPI:      This is a 74y.o. year old female who has a history of bilateral knee oa, on the schedule for knee replacement, but is presenting for preoperative planning regarding her fibromyalgia. She is concerned about postoperative pain. PMH:  Past Medical History:   Diagnosis Date    BPPV (benign paroxysmal positional vertigo)     Chronic fatigue syndrome 3/16/2012    Fibromyalgia     BRAVO on CPAP     BiPAP    Other dyspnea and respiratory abnormality     Palpitations     Prediabetes     Skin cancer 3/16/2012    Unspecified hypothyroidism     goiter    Vitamin D deficiency        PSxHx:  Past Surgical History:   Procedure Laterality Date    HX BREAST LUMPECTOMY  7/2013    right breast    HX TONSILLECTOMY      HX UROLOGICAL      urethral opening was widened       Meds:    Current Outpatient Medications:     famotidine (PEPCID) 40 mg tablet, Take 40 mg by mouth daily. , Disp: , Rfl:     tolnaftate (Blis-To-Sol, tolnaftate,) 1 % external solution, Apply  to affected area nightly., Disp: , Rfl:     OTHER,NON-FORMULARY,, Compound Progestrone 70mg, Disp: , Rfl:     OTHER, tolnaftate lotion- at night, Disp: , Rfl:     bismuth subsalicylate (PEPTO-BISMOL MAXIMUM STRENGTH) 525 mg/15 mL susp, Take 5 mL by mouth as needed. , Disp: , Rfl:     OTHER, as needed. bio-gest, Disp: , Rfl:     OTHER, as needed. D-hist querction, Disp: , Rfl:     multivitamin (ONE A DAY) tablet, Take 1 Tablet by mouth daily. , Disp: , Rfl:     Levothyroxine (Tirosint) 75 mcg cap, Take 75 mcg by mouth daily. , Disp: 90 Capsule, Rfl: 0    thyroid, Pork, ([employer] [place] Thyroid) 30 mg tablet, Take 30 mg by mouth two (2) times a day. Pt takes on pill at 8 am and the second at 1pm, Disp: , Rfl:     OTHER, uritrax, Disp: , Rfl:     BABY ASPIRIN PO, Take 81 mg by mouth.  Pt states she takes one to two pills, Disp: , Rfl:     OTHER, cbd oil 6 drops twice a day, Disp: , Rfl:     OTHER, Cbd cream on knees twice a day for knee pain, Disp: , Rfl:     VITAMIN A PO, Take 3,000 mg by mouth daily. , Disp: , Rfl:     magnesium gluconate 500 mg (27 mg elemental magnesium) tab tablet, magnesium 500 mg tablet  Take by oral route., Disp: , Rfl:     [place] Thyroid 15 mg tablet, Take 15 mg by mouth daily. Pt takes before breakfast, Disp: , Rfl:     lysine (L-LYSINE) 500 mg tab tablet, lysine 500 mg tablet  Take by oral route., Disp: , Rfl:     OTHER, Take 70 mg by mouth daily. Indications: Progestrone, Disp: , Rfl:     cholecalciferol, VITAMIN D3, (VITAMIN D3) 5,000 unit tab tablet, 49977 iu (in drop form), K2, Disp: , Rfl:     All:  Allergies   Allergen Reactions    Lidocaine Other (comments)     Body Shakes    Adhes. Band-Tape-Benzalkonium Swelling    Adhesive Swelling    Dairy Aid [Lactase] Unknown (comments)    Gluten Diarrhea     \"brain fog\", aching    Iodinated Contrast Media Other (comments)     Gets dizzy and shakey    Milk Containing Products Other (comments)     Aches and stomach cramps    Motrin [Ibuprofen] Unknown (comments)    Other Medication Other (comments)     Super sensitive to all medication    Soy Unknown (comments)    Sulfa (Sulfonamide Antibiotics) Unable to Obtain    Trulicity [Dulaglutide] Nausea Only     Stomach discomfort    Wheat Unknown (comments)       Social Hx:  Social History     Socioeconomic History    Marital status:    Tobacco Use    Smoking status: Never Smoker    Smokeless tobacco: Never Used   Vaping Use    Vaping Use: Never used   Substance and Sexual Activity    Alcohol use: Yes     Alcohol/week: 1.0 standard drink     Types: 1 Glasses of wine per week     Comment: Rare 0.5 glass of wine    Drug use: No    Sexual activity: Not Currently     Partners: Male   Social History Narrative    Lives in [facility] with . Used to work as a  for [employer].        Family Hx:  Family History   Problem Relation Age of Onset    Coronary Art Dis Mother     Thyroid Disease Mother     Diabetes Mother     Cancer Mother         Multiple Myeloma    Cancer Father         Colon cancer    OSTEOARTHRITIS Father     Heart Disease Father     High Cholesterol Brother     Thyroid Disease Brother         thyroid cancer    Cancer Brother         Thyroid and Skin    Diabetes Maternal Aunt     Hypertension Other     Heart Disease Other     Depression Other     Anxiety Other     Cancer Sister         Skin    Cancer Brother         Multiple Myeloma and skin    Cancer Brother         Skin         Review of Systems:       General: Denies headache, lethargy, fever, weight loss  Ears/Nose/Throat: Denies ear discharge, drainage, nosebleeds, hoarse voice, dental problems  Cardiovascular: Denies chest pain, shortness of breath  Lungs: Denies chest pain, breathing problems, wheezing, pneumonia  Stomach: Denies stomach pain, heartburn, constipation, irritable bowel  Skin: Denies rash, sores, open wounds  Musculoskeletal: bilateral knee pain  Genitourinary: Denies dysuria, hematuria, polyuria  Gastrointestinal: Denies constipation, obstipation, diarrhea  Neurological: Denies changes in sight, smell, hearing, taste, seizures. Denies loss of consciousness. Psychiatric: Denies depression, sleep pattern changes, anxiety, change in personality  Endocrine: Denies mood swings, heat or cold intolerance  Hematologic/Lymphatic: Denies anemia, purpura, petechia  Allergic/Immunologic: Denies swelling of throat, pain or swelling at lymph nodes      Physical Examination:    There were no vitals taken for this visit. General: AOX3, no apparent distress  Psychiatric: mood and affect appropriate        Diagnostics:    Pertinent Diagnostics: none today    Assessment: bilateral knee oa, fibromyalgia  Plan:    We discussed at length her options, she is appropriately concerned about a flare.   We discussed the options and normal modalities of pain control I use during and after surgery. I have also offered her a pain management/fibromyalgia workup prior to surgery vs. Nerve ablation. She has accepted the former. She will follow up with me for surgery, and I will utilize their recommendations at that time. She was in [place] at the time of consultation. I was in the office while conducting this encounter. Consent:  She and/or her healthcare decision maker is aware that this patient-initiated Telehealth encounter is a billable service, with coverage as determined by her insurance carrier. She is aware that she may receive a bill and has provided verbal consent to proceed: Yes    This virtual visit was conducted telephone encounter only. -  I affirm this is a Patient Initiated Episode with an Established Patient who has not had a related appointment within my department in the past 7 days or scheduled within the next 24 hours. Note: this encounter is not billable if this call serves to triage the patient into an appointment for the relevant concern. Total Time: minutes: 11-20 minutes. Ms. Deepali Fisher has a reminder for a \"due or due soon\" health maintenance. I have asked that she contact her primary care provider for follow-up on this health maintenance.

## 2022-06-22 ENCOUNTER — TELEPHONE (OUTPATIENT)
Dept: ORTHOPEDIC SURGERY | Age: 72
End: 2022-06-22

## 2022-06-22 NOTE — TELEPHONE ENCOUNTER
Patient is asking for Arcadio Hickman to call her back to discuss rescheduling her surgery due to not having lost weight yet or spoken with the pain management doctor about her fibromyalgia

## 2022-06-28 ENCOUNTER — TRANSCRIBE ORDER (OUTPATIENT)
Dept: SCHEDULING | Age: 72
End: 2022-06-28

## 2022-06-28 DIAGNOSIS — Z78.0 MENOPAUSE: Primary | ICD-10-CM

## 2022-07-05 ENCOUNTER — TELEPHONE (OUTPATIENT)
Dept: PRIMARY CARE CLINIC | Age: 72
End: 2022-07-05

## 2022-07-05 NOTE — TELEPHONE ENCOUNTER
Jacoby Garcia from [facility] is calling to request a prescription for physical therapy for patient. She says she has requested this two times before now.

## 2022-07-06 ENCOUNTER — TELEPHONE (OUTPATIENT)
Dept: ORTHOPEDIC SURGERY | Age: 72
End: 2022-07-06

## 2022-07-06 NOTE — TELEPHONE ENCOUNTER
Patient called the office requesting to r/s surgery. I returned her call and LVM advising that she would be placed back in the queue to schedule and would receive a call in the next few days.

## 2022-07-12 ENCOUNTER — VIRTUAL VISIT (OUTPATIENT)
Dept: PRIMARY CARE CLINIC | Age: 72
End: 2022-07-12
Payer: MEDICARE

## 2022-07-12 DIAGNOSIS — Z28.310 UNVACCINATED FOR COVID-19: ICD-10-CM

## 2022-07-12 DIAGNOSIS — Z20.822 CLOSE EXPOSURE TO COVID-19 VIRUS: ICD-10-CM

## 2022-07-12 DIAGNOSIS — R05.9 COUGH: Primary | ICD-10-CM

## 2022-07-12 PROCEDURE — G9899 SCRN MAM PERF RSLTS DOC: HCPCS | Performed by: FAMILY MEDICINE

## 2022-07-12 PROCEDURE — G8432 DEP SCR NOT DOC, RNG: HCPCS | Performed by: FAMILY MEDICINE

## 2022-07-12 PROCEDURE — 3017F COLORECTAL CA SCREEN DOC REV: CPT | Performed by: FAMILY MEDICINE

## 2022-07-12 PROCEDURE — 1101F PT FALLS ASSESS-DOCD LE1/YR: CPT | Performed by: FAMILY MEDICINE

## 2022-07-12 PROCEDURE — G8427 DOCREV CUR MEDS BY ELIG CLIN: HCPCS | Performed by: FAMILY MEDICINE

## 2022-07-12 PROCEDURE — 1090F PRES/ABSN URINE INCON ASSESS: CPT | Performed by: FAMILY MEDICINE

## 2022-07-12 PROCEDURE — G8399 PT W/DXA RESULTS DOCUMENT: HCPCS | Performed by: FAMILY MEDICINE

## 2022-07-12 PROCEDURE — 1123F ACP DISCUSS/DSCN MKR DOCD: CPT | Performed by: FAMILY MEDICINE

## 2022-07-12 PROCEDURE — 99213 OFFICE O/P EST LOW 20 MIN: CPT | Performed by: FAMILY MEDICINE

## 2022-07-12 RX ORDER — BENZONATATE 100 MG/1
100 CAPSULE ORAL
Qty: 15 CAPSULE | Refills: 0 | Status: SHIPPED | OUTPATIENT
Start: 2022-07-12 | End: 2022-08-03

## 2022-07-12 RX ORDER — BENZONATATE 100 MG/1
100 CAPSULE ORAL
Qty: 15 CAPSULE | Refills: 0 | Status: CANCELLED | OUTPATIENT
Start: 2022-07-12

## 2022-07-12 RX ORDER — BENZONATATE 100 MG/1
100 CAPSULE ORAL
Qty: 15 CAPSULE | Refills: 0 | Status: SHIPPED | OUTPATIENT
Start: 2022-07-12 | End: 2022-07-12

## 2022-07-12 NOTE — PROGRESS NOTES
Jayla Braden  74 y.o. female  1950  [place] [address]  [id number]   [facility]   Telemedicine Progress Note  Manjinder Doe DO       Encounter Date and Time: July 15, 2022 at 1:59 PM    Consent: Jayla Braden, who was seen by synchronous (real-time) audio-video technology, and/or her healthcare decision maker, is aware that this patient-initiated, Telehealth encounter on 7/12/2022 is a billable service, with coverage as determined by her insurance carrier. She is aware that she may receive a bill and has provided verbal consent to proceed: Yes. Chief Complaint   Patient presents with    Concern For COVID-19 (Coronavirus)     tested negative     Cold Symptoms     cough , headache, sore throat - started last night     History of Present Illness   Jayla Braden is a 74 y.o. female was evaluated by synchronous (real-time) audio-video technology from home, through a secure patient portal.    States her  recently tested positive for COVID. States she has a sore throat, headache and tested at 1 PM and it was negative. She is very concerned she will develop COVID 19. Review of Systems   Review of Systems   Constitutional: Negative for chills and fever. HENT: Positive for sore throat. Respiratory: Positive for cough. Neurological: Positive for headaches. Vitals/Objective:     General: alert, cooperative, no distress   Mental  status: mental status: alert, oriented to person, place, and time, normal mood, behavior, speech, dress, motor activity, and thought processes   Resp: resp: normal effort and no respiratory distress   Neuro: neuro: no gross deficits   Skin: skin: no discoloration or lesions of concern on visible areas   Due to this being a TeleHealth evaluation, many elements of the physical examination are unable to be assessed.       Assessment and Plan:   Time-based coding, delete if not needed: I spent at least 10 minutes with this established patient, and >50% of the time was spent counseling and/or coordinating care regarding exposure to COVID 19, cough    1. Close exposure to COVID-19 virus  Patient is unvaccinated for COVID 19. Recommend testing daily. If positive follow up with office for treatment discussion. 2. Unvaccinated for covid-19    3. Cough  - benzonatate (TESSALON) 100 mg capsule; Take 1 Capsule by mouth three (3) times daily as needed for Cough. Dispense: 15 Capsule; Refill: 0    Time spent in direct conversation with the patient to include medical condition(s) discussed, assessment and treatment plan: 10 min       We discussed the expected course, resolution and complications of the diagnosis(es) in detail. Medication risks, benefits, costs, interactions, and alternatives were discussed as indicated. I advised her to contact the office if her condition worsens, changes or fails to improve as anticipated. She expressed understanding with the diagnosis(es) and plan. Patient understands that this encounter was a temporary measure, and the importance of further follow up and examination was emphasized. Patient verbalized understanding. Electronically Signed: DO Jeremie Velasquezurbano Verduzco is a 74 y.o. female who was evaluated by an audio-video encounter for concerns as above. Patient identification was verified prior to start of the visit. A caregiver was present when appropriate. Due to this being a TeleHealth encounter (During [place] [id number] public health emergency), evaluation of the following organ systems was limited: Vitals/Constitutional/EENT/Resp/CV/GI//MS/Neuro/Skin/Heme-Lymph-Imm.   Pursuant to the emergency declaration under the [address] [place] [address], [address] [place] [address] authority and the [employer] Act, this Virtual Visit was conducted, with patient's (and/or legal guardian's) consent, to reduce the patient's risk of exposure to COVID-19 and provide necessary medical care. Services were provided through a synchronous discussion virtually to substitute for in-person clinic visit. I was in the office. The patient was at home. History   Patients past medical, surgical and family histories were reviewed and updated. Past Medical History:   Diagnosis Date    BPPV (benign paroxysmal positional vertigo)     Chronic fatigue syndrome 3/16/2012    Fibromyalgia     BRAVO on CPAP     BiPAP    Other dyspnea and respiratory abnormality     Palpitations     Prediabetes     Skin cancer 3/16/2012    Unspecified hypothyroidism     goiter    Vitamin D deficiency      Past Surgical History:   Procedure Laterality Date    HX BREAST LUMPECTOMY  7/2013    right breast    HX TONSILLECTOMY      HX UROLOGICAL      urethral opening was widened     Family History   Problem Relation Age of Onset    Coronary Art Dis Mother     Thyroid Disease Mother     Diabetes Mother     Cancer Mother         Multiple Myeloma    Cancer Father         Colon cancer    OSTEOARTHRITIS Father     Heart Disease Father     High Cholesterol Brother     Thyroid Disease Brother         thyroid cancer    Cancer Brother         Thyroid and Skin    Diabetes Maternal Aunt     Hypertension Other     Heart Disease Other     Depression Other     Anxiety Other     Cancer Sister         Skin    Cancer Brother         Multiple Myeloma and skin    Cancer Brother         Skin     Social History     Socioeconomic History    Marital status:      Spouse name: Not on file    Number of children: Not on file    Years of education: Not on file    Highest education level: Not on file   Occupational History    Not on file   Tobacco Use    Smoking status: Never Smoker    Smokeless tobacco: Never Used   Vaping Use    Vaping Use: Never used   Substance and Sexual Activity    Alcohol use:  Yes     Alcohol/week: 1.0 standard drink     Types: 1 Glasses of wine per week     Comment: Rare 0.5 glass of wine    Drug use: No    Sexual activity: Not Currently     Partners: Male   Other Topics Concern    Not on file   Social History Narrative    [facility] [place] . Used to work as a  for [employer]. Social Determinants of Health     Financial Resource Strain:     Difficulty of Paying Living Expenses: Not on file   Food Insecurity:     Worried About Running Out of Food in the Last Year: Not on file    Jeff of Food in the Last Year: Not on file   Transportation Needs:     Lack of Transportation (Medical): Not on file    Lack of Transportation (Non-Medical):  Not on file   Physical Activity:     Days of Exercise per Week: Not on file    Minutes of Exercise per Session: Not on file   Stress:     Feeling of Stress : Not on file   Social Connections:     Frequency of Communication with Friends and Family: Not on file    Frequency of Social Gatherings with Friends and Family: Not on file    Attends Episcopal Services: Not on file    Active Member of [address] [place] [address] or Organizations: Not on file    Attends Club or Organization Meetings: Not on file    Marital Status: Not on file   Intimate Partner Violence:     Fear of Current or Ex-Partner: Not on file    Emotionally Abused: Not on file    Physically Abused: Not on file    Sexually Abused: Not on file   Housing Stability:     Unable to Pay for Housing in the Last Year: Not on file    Number of [place] in the Last Year: Not on file    Unstable Housing in the Last Year: Not on file     Patient Active Problem List   Diagnosis Code    Obesity, unspecified E66.9    Palpitations R00.2    Impaired glucose tolerance R73.02    Skin cancer C44.90    Chronic fatigue syndrome R53.82    Fibromyalgia M79.7    Chest pain, unspecified R07.9    Iatrogenic hyperthyroidism E05.80    Supraventricular tachycardia, paroxysmal (HCC) I47.1    Unspecified vitamin D deficiency E55.9    Anxiety disorder F41.9    Multinodular goiter E04.2    Hypothyroidism (acquired) E03.9    Carpal tunnel syndrome, bilateral upper limbs G56.03    Cervical radiculopathy due to degenerative joint disease of spine M47.22    Obesity, morbid (HCC) E66.01          Current Medications/Allergies   Medications and Allergies reviewed:    Current Outpatient Medications   Medication Sig Dispense Refill    benzonatate (TESSALON) 100 mg capsule Take 1 Capsule by mouth three (3) times daily as needed for Cough. 15 Capsule 0    famotidine (PEPCID) 40 mg tablet Take 40 mg by mouth daily.  tolnaftate (Blis-To-Sol, tolnaftate,) 1 % external solution Apply  to affected area nightly.  OTHER tolnaftate lotion- at night      bismuth subsalicylate (PEPTO-BISMOL MAXIMUM STRENGTH) 525 mg/15 mL susp Take 5 mL by mouth as needed.  OTHER as needed. bio-gest      OTHER as needed. D-hist querction      multivitamin (ONE A DAY) tablet Take 1 Tablet by mouth daily.  thyroid, Pork, ([place] Thyroid) 30 mg tablet Take 30 mg by mouth two (2) times a day. Pt takes on pill at 8 am and the second at 1pm      BABY ASPIRIN PO Take 81 mg by mouth. Pt states she takes one to two pills      OTHER cbd oil 6 drops twice a day      OTHER Cbd cream on knees twice a day for knee pain      VITAMIN A PO Take 3,000 mg by mouth daily.  magnesium gluconate 500 mg (27 mg elemental magnesium) tab tablet magnesium 500 mg tablet   Take by oral route.  [place] Thyroid 15 mg tablet Take 15 mg by mouth daily. Pt takes before breakfast      cholecalciferol, VITAMIN D3, (VITAMIN D3) 5,000 unit tab tablet 69913 iu (in drop form), K2      Levothyroxine (Tirosint) 75 mcg cap Take 75 mcg by mouth daily. 90 Capsule 2     Allergies   Allergen Reactions    Lidocaine Other (comments)     Body Shakes    Adhes.  Band-Tape-Benzalkonium Swelling    Adhesive Swelling    Dairy Aid [Lactase] Unknown (comments)    Gluten Diarrhea     \"brain fog\", aching  Iodinated Contrast Media Other (comments)     Gets dizzy and shakey    Milk Containing Products Other (comments)     Aches and stomach cramps    Motrin [Ibuprofen] Unknown (comments)    Other Medication Other (comments)     Super sensitive to all medication    Soy Unknown (comments)    Sulfa (Sulfonamide Antibiotics) Unable to Obtain    Trulicity [Dulaglutide] Nausea Only     Stomach discomfort    Wheat Unknown (comments)

## 2022-07-12 NOTE — PROGRESS NOTES
Identified pt with two pt identifiers(name and ). Chief Complaint   Patient presents with    Positive For Covid-19        3 most recent PHQ Screens 6/10/2022   Little interest or pleasure in doing things Not at all   Feeling down, depressed, irritable, or hopeless Several days   Total Score PHQ 2 1        There were no vitals filed for this visit. Health Maintenance Due   Topic Date Due    Hepatitis C Screening  Never done    COVID-19 Vaccine (1) Never done    DTaP/Tdap/Td series (1 - Tdap) Never done    Colorectal Cancer Screening Combo  Never done    Shingrix Vaccine Age 50> (1 of 2) Never done    Pneumococcal 65+ years (1 - PCV) Never done    Medicare Yearly Exam  Never done        1. Have you been to the ER, urgent care clinic since your last visit? Hospitalized since your last visit? No    2. Have you seen or consulted any other health care providers outside of the [address] [place] [address] since your last visit? Include any pap smears or colon screening. No    3. For patients aged 39-70: Has the patient had a colonoscopy / FIT/ Cologuard? No      If the patient is female:    4. For patients aged 41-77: Has the patient had a mammogram within the past 2 years? Yes - no Care Gap present      5. For patients aged 21-65: Has the patient had a pap smear?  Yes - no Care Gap present

## 2022-07-12 NOTE — TELEPHONE ENCOUNTER
Patient woke up with a sore throat and is worried because her  has covid. She would like to talk to the doctor about possibly having covid herself.

## 2022-07-13 ENCOUNTER — TELEPHONE (OUTPATIENT)
Dept: PRIMARY CARE CLINIC | Age: 72
End: 2022-07-13

## 2022-07-13 RX ORDER — LEVOTHYROXINE SODIUM 75 UG/1
75 CAPSULE ORAL DAILY
Qty: 90 CAPSULE | Refills: 2 | Status: SHIPPED | OUTPATIENT
Start: 2022-07-13 | End: 2022-08-16 | Stop reason: SDUPTHER

## 2022-07-13 NOTE — TELEPHONE ENCOUNTER
Patient said she tested positive for Covid. Patient said she was told by Dr. Kip Griffin to let her immediately. Patient wants to know what should she do next.  Patient asked for a call back from Dr. Kip Griffin or nurse

## 2022-07-14 ENCOUNTER — VIRTUAL VISIT (OUTPATIENT)
Dept: PRIMARY CARE CLINIC | Age: 72
End: 2022-07-14
Payer: MEDICARE

## 2022-07-14 DIAGNOSIS — Z28.310 UNVACCINATED FOR COVID-19: ICD-10-CM

## 2022-07-14 DIAGNOSIS — U07.1 COVID-19: Primary | ICD-10-CM

## 2022-07-14 PROCEDURE — 1101F PT FALLS ASSESS-DOCD LE1/YR: CPT | Performed by: FAMILY MEDICINE

## 2022-07-14 PROCEDURE — G8432 DEP SCR NOT DOC, RNG: HCPCS | Performed by: FAMILY MEDICINE

## 2022-07-14 PROCEDURE — 1090F PRES/ABSN URINE INCON ASSESS: CPT | Performed by: FAMILY MEDICINE

## 2022-07-14 PROCEDURE — 1123F ACP DISCUSS/DSCN MKR DOCD: CPT | Performed by: FAMILY MEDICINE

## 2022-07-14 PROCEDURE — 3017F COLORECTAL CA SCREEN DOC REV: CPT | Performed by: FAMILY MEDICINE

## 2022-07-14 PROCEDURE — G8427 DOCREV CUR MEDS BY ELIG CLIN: HCPCS | Performed by: FAMILY MEDICINE

## 2022-07-14 PROCEDURE — G8399 PT W/DXA RESULTS DOCUMENT: HCPCS | Performed by: FAMILY MEDICINE

## 2022-07-14 PROCEDURE — 99213 OFFICE O/P EST LOW 20 MIN: CPT | Performed by: FAMILY MEDICINE

## 2022-07-14 PROCEDURE — G9899 SCRN MAM PERF RSLTS DOC: HCPCS | Performed by: FAMILY MEDICINE

## 2022-07-14 NOTE — PROGRESS NOTES
Barney Clarke  74 y.o. female  1950  [place] [address]  [id number]   [facility]   Telemedicine Progress Note  Stacey Vanegas DO       Encounter Date and Time: July 14, 2022 at 10:54 AM    Consent: Barney Clarke, who was seen by synchronous (real-time) audio-video technology, and/or her healthcare decision maker, is aware that this patient-initiated, Telehealth encounter on 7/14/2022 is a billable service, with coverage as determined by her insurance carrier. She is aware that she may receive a bill and has provided verbal consent to proceed: Yes. Chief Complaint   Patient presents with    Positive For Covid-19     History of Present Illness   Barney Clarke is a 74 y.o. female was evaluated by synchronous (real-time) audio-video technology from home, through a secure patient portal.    Patient tested positive for COVID 19 yesterday morning. She has not been vaccinated for COVID 19. Symptoms started 7/11. Endorses rhinorrhea. Tmax is 99.4. No temp over 100.4. She is having trouble sleeping. Review of Systems   Review of Systems   Constitutional: Negative for fever. HENT: Positive for congestion. Respiratory: Negative for shortness of breath. Cardiovascular: Negative for chest pain. Musculoskeletal: Positive for myalgias. Vitals/Objective:     General: alert, cooperative, no distress   Mental  status: mental status: alert, oriented to person, place, and time, normal mood, behavior, speech, dress, motor activity, and thought processes   Resp: resp: normal effort and no respiratory distress   Neuro: neuro: no gross deficits   Skin: skin: no discoloration or lesions of concern on visible areas   Due to this being a TeleHealth evaluation, many elements of the physical examination are unable to be assessed.       Assessment and Plan:   Time-based coding, delete if not needed: I spent at least 10 minutes with this established patient, and >50% of the time was spent counseling and/or coordinating care regarding COVID 19    1. COVID-19  2. Unvaccinated for covid-19    Discussed symptomatic care vs Paxlovid vs infusion. She is very concerned about side effects from medication and would like to continue symptomatic care are this time. Symptomatic care discussed including Tylenol PRN fever, body aches. Quarantine precautions given based on most current CDC recommendations. ER precautions include chest pain, shortness of breath, lethargy, confusion, decreased PO intake/ UOP. Follow up if symptoms are not improving. Time spent in direct conversation with the patient to include medical condition(s) discussed, assessment and treatment plan: 10 min    We discussed the expected course, resolution and complications of the diagnosis(es) in detail. Medication risks, benefits, costs, interactions, and alternatives were discussed as indicated. I advised her to contact the office if her condition worsens, changes or fails to improve as anticipated. She expressed understanding with the diagnosis(es) and plan. Patient understands that this encounter was a temporary measure, and the importance of further follow up and examination was emphasized. Patient verbalized understanding. Electronically Signed: Tee Rachelber DO Flor Jasso is a 74 y.o. female who was evaluated by an audio-video encounter for concerns as above. Patient identification was verified prior to start of the visit. A caregiver was present when appropriate. Due to this being a TeleHealth encounter (During [facility]- public health emergency), evaluation of the following organ systems was limited: Vitals/Constitutional/EENT/Resp/CV/GI//MS/Neuro/Skin/Heme-Lymph-Imm.   Pursuant to the emergency declaration under the [address] [facility], [address] [place] [address] authority and the [employer] Act, this Virtual Visit was conducted, with patient's (and/or legal guardian's) consent, to reduce the patient's risk of exposure to COVID-19 and provide necessary medical care. Services were provided through a synchronous discussion virtually to substitute for in-person clinic visit. I was in the office. The patient was at home. History   Patients past medical, surgical and family histories were reviewed and updated.       Past Medical History:   Diagnosis Date    BPPV (benign paroxysmal positional vertigo)     Chronic fatigue syndrome 3/16/2012    Fibromyalgia     BRAVO on CPAP     BiPAP    Other dyspnea and respiratory abnormality     Palpitations     Prediabetes     Skin cancer 3/16/2012    Unspecified hypothyroidism     goiter    Vitamin D deficiency      Past Surgical History:   Procedure Laterality Date    HX BREAST LUMPECTOMY  7/2013    right breast    HX TONSILLECTOMY      HX UROLOGICAL      urethral opening was widened     Family History   Problem Relation Age of Onset    Coronary Art Dis Mother     Thyroid Disease Mother     Diabetes Mother     Cancer Mother         Multiple Myeloma    Cancer Father         Colon cancer    OSTEOARTHRITIS Father     Heart Disease Father     High Cholesterol Brother     Thyroid Disease Brother         thyroid cancer    Cancer Brother         Thyroid and Skin    Diabetes Maternal Aunt     Hypertension Other     Heart Disease Other     Depression Other     Anxiety Other     Cancer Sister         Skin    Cancer Brother         Multiple Myeloma and skin    Cancer Brother         Skin     Social History     Socioeconomic History    Marital status:      Spouse name: Not on file    Number of children: Not on file    Years of education: Not on file    Highest education level: Not on file   Occupational History    Not on file   Tobacco Use    Smoking status: Never Smoker    Smokeless tobacco: Never Used   Vaping Use    Vaping Use: Never used   Substance and Sexual Activity    Alcohol use: Yes     Alcohol/week: 1.0 standard drink     Types: 1 Glasses of wine per week     Comment: Rare 0.5 glass of wine    Drug use: No    Sexual activity: Not Currently     Partners: Male   Other Topics Concern    Not on file   Social History Narrative    [facility] [place] [place] [facility] . Used to work as a  for [employer]. Social Determinants of Health     Financial Resource Strain:     Difficulty of Paying Living Expenses: Not on file   Food Insecurity:     Worried About Running Out of Food in the Last Year: Not on file    Jeff of Food in the Last Year: Not on file   Transportation Needs:     Lack of Transportation (Medical): Not on file    Lack of Transportation (Non-Medical):  Not on file   Physical Activity:     Days of Exercise per Week: Not on file    Minutes of Exercise per Session: Not on file   Stress:     Feeling of Stress : Not on file   Social Connections:     Frequency of Communication with Friends and Family: Not on file    Frequency of Social Gatherings with Friends and Family: Not on file    Attends Tenriism Services: Not on file    Active Member of [address] [place] [address] Customer Alliance or Organizations: Not on file    Attends Club or Organization Meetings: Not on file    Marital Status: Not on file   Intimate Partner Violence:     Fear of Current or Ex-Partner: Not on file    Emotionally Abused: Not on file    Physically Abused: Not on file    Sexually Abused: Not on file   Housing Stability:     Unable to Pay for Housing in the Last Year: Not on file    Number of [place] in the Last Year: Not on file    Unstable Housing in the Last Year: Not on file     Patient Active Problem List   Diagnosis Code    Obesity, unspecified E66.9    Palpitations R00.2    Impaired glucose tolerance R73.02    Skin cancer C44.90    Chronic fatigue syndrome R53.82    Fibromyalgia M79.7    Chest pain, unspecified R07.9    Iatrogenic hyperthyroidism E05.80    Supraventricular tachycardia, paroxysmal (HCC) I47.1    Unspecified vitamin D deficiency E55.9    Anxiety disorder F41.9    Multinodular goiter E04.2    Hypothyroidism (acquired) E03.9    Carpal tunnel syndrome, bilateral upper limbs G56.03    Cervical radiculopathy due to degenerative joint disease of spine M47.22    Obesity, morbid (HCC) E66.01          Current Medications/Allergies   Medications and Allergies reviewed:    Current Outpatient Medications   Medication Sig Dispense Refill    Levothyroxine (Tirosint) 75 mcg cap Take 75 mcg by mouth daily. 90 Capsule 2    benzonatate (TESSALON) 100 mg capsule Take 1 Capsule by mouth three (3) times daily as needed for Cough. 15 Capsule 0    famotidine (PEPCID) 40 mg tablet Take 40 mg by mouth daily.  tolnaftate (Blis-To-Sol, tolnaftate,) 1 % external solution Apply  to affected area nightly.  OTHER tolnaftate lotion- at night      bismuth subsalicylate (PEPTO-BISMOL MAXIMUM STRENGTH) 525 mg/15 mL susp Take 5 mL by mouth as needed.  OTHER as needed. bio-gest      OTHER as needed. D-hist querction      multivitamin (ONE A DAY) tablet Take 1 Tablet by mouth daily.  thyroid, Pork, ([place] Thyroid) 30 mg tablet Take 30 mg by mouth two (2) times a day. Pt takes on pill at 8 am and the second at 1pm      BABY ASPIRIN PO Take 81 mg by mouth. Pt states she takes one to two pills      OTHER cbd oil 6 drops twice a day      OTHER Cbd cream on knees twice a day for knee pain      VITAMIN A PO Take 3,000 mg by mouth daily.  magnesium gluconate 500 mg (27 mg elemental magnesium) tab tablet magnesium 500 mg tablet   Take by oral route.  [place] Thyroid 15 mg tablet Take 15 mg by mouth daily.  Pt takes before breakfast      cholecalciferol, VITAMIN D3, (VITAMIN D3) 5,000 unit tab tablet 34377 iu (in drop form), K2      OTHER,NON-FORMULARY, Compound Progestrone 70mg      OTHER uritrax      lysine (L-LYSINE) 500 mg tab tablet lysine 500 mg tablet   Take by oral route.  OTHER Take 70 mg by mouth daily. Indications: Progestrone       Allergies   Allergen Reactions    Lidocaine Other (comments)     Body Shakes    Adhes.  Band-Tape-Benzalkonium Swelling    Adhesive Swelling    Dairy Aid [Lactase] Unknown (comments)    Gluten Diarrhea     \"brain fog\", aching    Iodinated Contrast Media Other (comments)     Gets dizzy and shakey    Milk Containing Products Other (comments)     Aches and stomach cramps    Motrin [Ibuprofen] Unknown (comments)    Other Medication Other (comments)     Super sensitive to all medication    Soy Unknown (comments)    Sulfa (Sulfonamide Antibiotics) Unable to Obtain    Trulicity [Dulaglutide] Nausea Only     Stomach discomfort    Wheat Unknown (comments)

## 2022-07-14 NOTE — PROGRESS NOTES
Identified pt with two pt identifiers(name and ). Chief Complaint   Patient presents with    Positive For Covid-19        3 most recent PHQ Screens 6/10/2022   Little interest or pleasure in doing things Not at all   Feeling down, depressed, irritable, or hopeless Several days   Total Score PHQ 2 1        There were no vitals filed for this visit. Health Maintenance Due   Topic Date Due    Hepatitis C Screening  Never done    COVID-19 Vaccine (1) Never done    DTaP/Tdap/Td series (1 - Tdap) Never done    Colorectal Cancer Screening Combo  Never done    Shingrix Vaccine Age 50> (1 of 2) Never done    Pneumococcal 65+ years (1 - PCV) Never done    Medicare Yearly Exam  Never done        1. Have you been to the ER, urgent care clinic since your last visit? Hospitalized since your last visit? No    2. Have you seen or consulted any other health care providers outside of the [address] [place] [address] since your last visit? Include any pap smears or colon screening. No    3. For patients aged 39-70: Has the patient had a colonoscopy / FIT/ Cologuard? No      If the patient is female:    4. For patients aged 41-77: Has the patient had a mammogram within the past 2 years? Yes - no Care Gap present      5. For patients aged 21-65: Has the patient had a pap smear?  Yes - no Care Gap present

## 2022-07-19 ENCOUNTER — TELEPHONE (OUTPATIENT)
Dept: PRIMARY CARE CLINIC | Age: 72
End: 2022-07-19

## 2022-07-19 NOTE — TELEPHONE ENCOUNTER
Patient calling because she tested positive for covid on July 12th. Patient stated she no longer has a fever but she has started having diarrhea. Wants to know she can take for that.

## 2022-08-03 ENCOUNTER — OFFICE VISIT (OUTPATIENT)
Dept: PRIMARY CARE CLINIC | Age: 72
End: 2022-08-03
Payer: MEDICARE

## 2022-08-03 VITALS
TEMPERATURE: 97.5 F | RESPIRATION RATE: 20 BRPM | HEART RATE: 68 BPM | HEIGHT: 66 IN | BODY MASS INDEX: 40.18 KG/M2 | DIASTOLIC BLOOD PRESSURE: 78 MMHG | WEIGHT: 250 LBS | SYSTOLIC BLOOD PRESSURE: 137 MMHG | OXYGEN SATURATION: 98 %

## 2022-08-03 DIAGNOSIS — L91.8 SKIN TAG: ICD-10-CM

## 2022-08-03 DIAGNOSIS — R11.0 NAUSEA: ICD-10-CM

## 2022-08-03 DIAGNOSIS — M17.0 PRIMARY OSTEOARTHRITIS OF BOTH KNEES: ICD-10-CM

## 2022-08-03 DIAGNOSIS — R10.84 GENERALIZED ABDOMINAL PAIN: Primary | ICD-10-CM

## 2022-08-03 LAB
ALBUMIN SERPL-MCNC: 3.7 G/DL (ref 3.5–5)
ALBUMIN/GLOB SERPL: 1.2 {RATIO} (ref 1.1–2.2)
ALP SERPL-CCNC: 68 U/L (ref 45–117)
ALT SERPL-CCNC: 90 U/L (ref 12–78)
ANION GAP SERPL CALC-SCNC: 6 MMOL/L (ref 5–15)
AST SERPL-CCNC: 51 U/L (ref 15–37)
BILIRUB SERPL-MCNC: 0.3 MG/DL (ref 0.2–1)
BUN SERPL-MCNC: 18 MG/DL (ref 6–20)
BUN/CREAT SERPL: 19 (ref 12–20)
CALCIUM SERPL-MCNC: 9.4 MG/DL (ref 8.5–10.1)
CHLORIDE SERPL-SCNC: 107 MMOL/L (ref 97–108)
CO2 SERPL-SCNC: 29 MMOL/L (ref 21–32)
COMMENT, HOLDF: NORMAL
CREAT SERPL-MCNC: 0.96 MG/DL (ref 0.55–1.02)
ERYTHROCYTE [DISTWIDTH] IN BLOOD BY AUTOMATED COUNT: 12.9 % (ref 11.5–14.5)
GLOBULIN SER CALC-MCNC: 3 G/DL (ref 2–4)
GLUCOSE SERPL-MCNC: 110 MG/DL (ref 65–100)
HCT VFR BLD AUTO: 43.5 % (ref 35–47)
HGB BLD-MCNC: 14 G/DL (ref 11.5–16)
LIPASE SERPL-CCNC: 168 U/L (ref 73–393)
MCH RBC QN AUTO: 31 PG (ref 26–34)
MCHC RBC AUTO-ENTMCNC: 32.2 G/DL (ref 30–36.5)
MCV RBC AUTO: 96.5 FL (ref 80–99)
NRBC # BLD: 0 K/UL (ref 0–0.01)
NRBC BLD-RTO: 0 PER 100 WBC
PLATELET # BLD AUTO: 306 K/UL (ref 150–400)
PMV BLD AUTO: 10.5 FL (ref 8.9–12.9)
POTASSIUM SERPL-SCNC: 4.5 MMOL/L (ref 3.5–5.1)
PROT SERPL-MCNC: 6.7 G/DL (ref 6.4–8.2)
RBC # BLD AUTO: 4.51 M/UL (ref 3.8–5.2)
SAMPLES BEING HELD,HOLD: NORMAL
SODIUM SERPL-SCNC: 142 MMOL/L (ref 136–145)
WBC # BLD AUTO: 6 K/UL (ref 3.6–11)

## 2022-08-03 PROCEDURE — 3017F COLORECTAL CA SCREEN DOC REV: CPT | Performed by: FAMILY MEDICINE

## 2022-08-03 PROCEDURE — G8427 DOCREV CUR MEDS BY ELIG CLIN: HCPCS | Performed by: FAMILY MEDICINE

## 2022-08-03 PROCEDURE — G8399 PT W/DXA RESULTS DOCUMENT: HCPCS | Performed by: FAMILY MEDICINE

## 2022-08-03 PROCEDURE — 1123F ACP DISCUSS/DSCN MKR DOCD: CPT | Performed by: FAMILY MEDICINE

## 2022-08-03 PROCEDURE — 99214 OFFICE O/P EST MOD 30 MIN: CPT | Performed by: FAMILY MEDICINE

## 2022-08-03 PROCEDURE — G8417 CALC BMI ABV UP PARAM F/U: HCPCS | Performed by: FAMILY MEDICINE

## 2022-08-03 PROCEDURE — G8432 DEP SCR NOT DOC, RNG: HCPCS | Performed by: FAMILY MEDICINE

## 2022-08-03 PROCEDURE — 1090F PRES/ABSN URINE INCON ASSESS: CPT | Performed by: FAMILY MEDICINE

## 2022-08-03 PROCEDURE — 1101F PT FALLS ASSESS-DOCD LE1/YR: CPT | Performed by: FAMILY MEDICINE

## 2022-08-03 PROCEDURE — G9899 SCRN MAM PERF RSLTS DOC: HCPCS | Performed by: FAMILY MEDICINE

## 2022-08-03 PROCEDURE — G8536 NO DOC ELDER MAL SCRN: HCPCS | Performed by: FAMILY MEDICINE

## 2022-08-03 RX ORDER — ONDANSETRON 4 MG/1
4 TABLET, ORALLY DISINTEGRATING ORAL
Qty: 12 TABLET | Refills: 0 | Status: SHIPPED | OUTPATIENT
Start: 2022-08-03 | End: 2022-08-16 | Stop reason: ALTCHOICE

## 2022-08-03 RX ORDER — MAGNESIUM GLYCINATE 100 MG
240 CAPSULE ORAL
COMMUNITY
Start: 2020-10-14

## 2022-08-03 NOTE — PROGRESS NOTES
Identified pt with two pt identifiers(name and ). Chief Complaint   Patient presents with    Abdominal Pain     Burning , swelling     Post-COVID Symptoms     Night lucas     Other     Skin tag          3 most recent PHQ Screens 6/10/2022   Little interest or pleasure in doing things Not at all   Feeling down, depressed, irritable, or hopeless Several days   Total Score PHQ 2 1        Vitals:    22 1543   BP: 137/78   Pulse: 68   Resp: 20   Temp: 97.5 °F (36.4 °C)   TempSrc: Temporal   SpO2: 98%   Weight: 250 lb (113.4 kg)   Height: 5' 5.5\" (1.664 m)       Health Maintenance Due   Topic Date Due    Hepatitis C Screening  Never done    COVID-19 Vaccine (1) Never done    DTaP/Tdap/Td series (1 - Tdap) Never done    Colorectal Cancer Screening Combo  Never done    Shingrix Vaccine Age 50> (1 of 2) Never done    Pneumococcal 65+ years (1 - PCV) Never done    Medicare Yearly Exam  Never done        1. Have you been to the ER, urgent care clinic since your last visit? Hospitalized since your last visit? YES FOR UTI    2. Have you seen or consulted any other health care providers outside of the [address] [place] [address] since your last visit? Include any pap smears or colon screening. No    3. For patients aged 39-70: Has the patient had a colonoscopy / FIT/ Cologuard? No      If the patient is female:    4. For patients aged 41-77: Has the patient had a mammogram within the past 2 years? Yes - no Care Gap present      5. For patients aged 21-65: Has the patient had a pap smear?  No

## 2022-08-03 NOTE — PROGRESS NOTES
HPI     Chief Complaint   Patient presents with    Abdominal Pain     Burning , swelling     Post-COVID Symptoms     Night lucas     Other     Skin tag       She is a 74 y.o. female who presents for abdominal pain. Discussed we may not have time to address all concerns. Wanted to discuss abdominal pain, knee pain and skin tags. Abdominal Pain - x2 weeks. States she is having diarrhea. Endorses burning epigastric pain, reflux and burping. States she has been speaking with her functional doctor who told he she has holes in her digestive system. No blood in her stool. No dysuria. No hematuria. States she has incontinence at baseline. Had a recent UTI and was treated with Fosfomycin 7/9/22. Has been taking Famotidine, chinese herbs and Pepto Bismol which helps sometimes. States yesterday she had broth and crackers to eat. States she saw a GI doctor in Feb and had a colonoscopy. Knee Pain - Has been using rub on CBD oil for her BL knee pain. States she takes Tylenol PRN knee pain. States she has used Voltaren gel in the past which didn't work. States she has seen Ortho and they continue to tell her she has knee replacements. Skin Tag - Notes she has multiple skin tags under her underarms. Would like these removed. Review of Systems   Gastrointestinal:  Positive for abdominal pain and nausea. Negative for blood in stool, diarrhea and vomiting. Musculoskeletal:  Positive for arthralgias. Reviewed PmHx, RxHx, FmHx, SocHx, AllgHx and updated and dated in the chart. Physical Exam:  Visit Vitals  /78 (BP 1 Location: Left upper arm, BP Patient Position: Sitting, BP Cuff Size: Adult long)   Pulse 68   Temp 97.5 °F (36.4 °C) (Temporal)   Resp 20   Ht 5' 5.5\" (1.664 m)   Wt 250 lb (113.4 kg)   SpO2 98%   BMI 40.97 kg/m²     Physical Exam  Vitals and nursing note reviewed. Constitutional:       General: She is not in acute distress. Appearance: Normal appearance.  She is not ill-appearing. Cardiovascular:      Rate and Rhythm: Normal rate and regular rhythm. Heart sounds: No murmur heard. Pulmonary:      Effort: Pulmonary effort is normal. No respiratory distress. Breath sounds: Normal breath sounds. Abdominal:      General: There is no distension. Palpations: Abdomen is soft. There is no mass. Tenderness: There is no abdominal tenderness. There is no guarding or rebound. Neurological:      General: No focal deficit present. Mental Status: She is alert. Psychiatric:         Mood and Affect: Mood normal.         Behavior: Behavior normal.     UA WNL  No results found for this or any previous visit (from the past 12 hour(s)). Assessment / Plan     Diagnoses and all orders for this visit:    1. Generalized abdominal pain  -     CBC W/O DIFF; Future  -     LIPASE; Future  -     METABOLIC PANEL, COMPREHENSIVE; Future  -     US ABD COMP; Future  -     AMB POC URINALYSIS DIP STICK AUTO W/O MICRO  -     REFERRAL TO GASTROENTEROLOGY    2. Skin tag  -     REFERRAL TO DERMATOLOGY    3. Nausea  -     REFERRAL TO GASTROENTEROLOGY  -     ondansetron (ZOFRAN ODT) 4 mg disintegrating tablet; Take 1 Tablet by mouth every eight (8) hours as needed for Nausea or Vomiting. 4. Primary osteoarthritis of both knees     Reccomend follow up with Ortho. Discussed ER precautions for abdominal pain. Needs to follow up with GI. I have discussed the diagnosis with the patient and the intended plan as seen in the above orders. The patient has received an after-visit summary and questions were answered concerning future plans. I have discussed medication side effects and warnings with the patient as well.     Celeste Meraz, DO

## 2022-08-16 ENCOUNTER — OFFICE VISIT (OUTPATIENT)
Dept: ENDOCRINOLOGY | Age: 72
End: 2022-08-16
Payer: MEDICARE

## 2022-08-16 VITALS
SYSTOLIC BLOOD PRESSURE: 130 MMHG | BODY MASS INDEX: 39.86 KG/M2 | DIASTOLIC BLOOD PRESSURE: 78 MMHG | HEIGHT: 66 IN | WEIGHT: 248 LBS

## 2022-08-16 DIAGNOSIS — E03.9 ACQUIRED HYPOTHYROIDISM: Primary | ICD-10-CM

## 2022-08-16 PROCEDURE — G8536 NO DOC ELDER MAL SCRN: HCPCS | Performed by: INTERNAL MEDICINE

## 2022-08-16 PROCEDURE — 3017F COLORECTAL CA SCREEN DOC REV: CPT | Performed by: INTERNAL MEDICINE

## 2022-08-16 PROCEDURE — G8399 PT W/DXA RESULTS DOCUMENT: HCPCS | Performed by: INTERNAL MEDICINE

## 2022-08-16 PROCEDURE — 99214 OFFICE O/P EST MOD 30 MIN: CPT | Performed by: INTERNAL MEDICINE

## 2022-08-16 PROCEDURE — G8432 DEP SCR NOT DOC, RNG: HCPCS | Performed by: INTERNAL MEDICINE

## 2022-08-16 PROCEDURE — G9899 SCRN MAM PERF RSLTS DOC: HCPCS | Performed by: INTERNAL MEDICINE

## 2022-08-16 PROCEDURE — G8417 CALC BMI ABV UP PARAM F/U: HCPCS | Performed by: INTERNAL MEDICINE

## 2022-08-16 PROCEDURE — G8427 DOCREV CUR MEDS BY ELIG CLIN: HCPCS | Performed by: INTERNAL MEDICINE

## 2022-08-16 PROCEDURE — 1123F ACP DISCUSS/DSCN MKR DOCD: CPT | Performed by: INTERNAL MEDICINE

## 2022-08-16 PROCEDURE — 1101F PT FALLS ASSESS-DOCD LE1/YR: CPT | Performed by: INTERNAL MEDICINE

## 2022-08-16 PROCEDURE — 1090F PRES/ABSN URINE INCON ASSESS: CPT | Performed by: INTERNAL MEDICINE

## 2022-08-16 RX ORDER — LEVOTHYROXINE SODIUM 75 UG/1
CAPSULE ORAL
Qty: 90 CAPSULE | Refills: 3 | Status: SHIPPED | OUTPATIENT
Start: 2022-08-16

## 2022-08-16 NOTE — LETTER
8/17/2022    Patient: Jayla Braden   YOB: 1950   Date of Visit: 8/16/2022     Manjinder Deo, [address] [facility] [address] [facility]    Dear Manjinder Doe DO,      Thank you for referring Ms. Kina Lino to [facility] for evaluation. My notes for this consultation are attached. If you have questions, please do not hesitate to call me. I look forward to following your patient along with you.       Sincerely,    Raul Perez MD

## 2022-08-16 NOTE — PROGRESS NOTES
Chief Complaint   Patient presents with    Thyroid Problem     Needs brand thyroid medication       * New Patient Visit     General:  Dx hypothyroidism since '90s  On current regimen   Over the years high variability - does great on tirosint   But GYN took off Tirosint and put on generic  - feels horrible on generic   Has been on naturthroid (suddenly felt bad on it) and switched to armour > 6 mo ago      On armour 30gm BID (~100mcg T4 equivalent) + 75mcg generic T4 == total 175mcg   Wt based is 182mcg       History   Start Date End Date   thyroid, Pork, (ARMOUR) 30 mg tablet 03/17/22 --   [place] Thyroid 15 mg tablet 10/20/21 08/03/22   thyroid, pork, (NATURE-THROID) 81.25 mg tab 06/07/18 12/07/21   Nature-Throid 97.5 mg tab 05/11/20 07/27/20   thyroid, Pork, (NATURE-THROID/WESTHROID) 32.5 mg tablet 06/07/18 06/17/20   NATURE-THROID 65 mg tab 09/21/15 07/22/16   NATURE-THROID 81.25 mg tab 09/21/15 07/22/16   NATURE-THROID 130 mg tab 04/07/15 09/21/15   NATURE-THROID 97.5 mg tab 04/07/15 09/21/15   NATURE-THROID 48.75 mg tab 08/20/15 09/21/15   NATURE-THROID 65 mg tab 08/20/15 09/21/15   NATURE-THROID 162.5 mg tab 02/12/15 05/28/15   thyroid, Pork, (ARMOUR) 60 mg tablet 11/05/12 02/19/13   thyroid, Pork, (ARMOUR THYROID) 30 mg tablet 04/11/12 04/17/12   Thyroid, Pork, (ARMOUR THYROID) 120 mg Tab 03/01/12 04/11/12       Thyroid Symptoms  denies change in energy, denies change in weight, denies change in appetite, denies sleep issues, denies hair changes, no skin changes, denies sweats, denies hot/cold intolerance, denies tremor, denies palpitations, denies change in bowels, no change in menses, denies mood changes      Neck Symptoms  denies dysphagia, denies anterior neck pain, denies neck swelling, notes no nodules      Labs/Studies    6/8/15 Thyroid US: The thyroid texture is heterogeneous. No definable nodule is identified,  however.   The right lobe measures 4.2 x 1.7 x 1.6 cm and the left lobe measures 3.9 x 1.2 x 0. 9 cm. The isthmus measures 3 mm. 2/14/22 FT4 1.2, TSH 1.7, FT3 3.2    6/28/22 FSH 20, est 19.9, prog 0.1, TSH 2.0, FT4 1.0      Lab Results   Component Value Date/Time    TSH 1.45 03/17/2022 11:45 AM    T3, total 133 12/23/2021 01:35 PM    T3 Uptake 23 (L) 06/05/2020 11:11 AM    T4, Free 1.0 03/17/2022 11:45 AM    T4, Total 5.0 06/05/2020 11:11 AM    Thyroid peroxidase Ab 11 05/28/2015 12:13 PM        Lab Results   Component Value Date/Time    TSH 1.45 03/17/2022 11:45 AM    TSH 8.75 (H) 12/23/2021 01:35 PM    TSH 3.450 06/05/2020 11:11 AM    TSH 9.860 (H) 09/15/2015 12:00 AM    TSH 9.520 (H) 09/15/2015 12:00 AM    TSH 2.780 05/28/2015 12:13 PM    TSH 1.980 04/11/2012 12:44 PM          Past Medical History:   Diagnosis Date    BPPV (benign paroxysmal positional vertigo)     Chronic fatigue syndrome 3/16/2012    Fibromyalgia     BRAVO on CPAP     BiPAP    Other dyspnea and respiratory abnormality     Palpitations     Prediabetes     Skin cancer 3/16/2012    Unspecified hypothyroidism     goiter    Vitamin D deficiency       Blood pressure 130/78, height 5' 5.5\" (1.664 m), weight 248 lb (112.5 kg). Weight Metrics 8/16/2022 8/3/2022 6/10/2022 5/24/2022 4/13/2022 3/17/2022 2/28/2022   Weight 248 lb 250 lb 251 lb 248 lb 250 lb 250 lb 247 lb   BMI 40.64 kg/m2 40.97 kg/m2 41.13 kg/m2 40.64 kg/m2 40.97 kg/m2 40.97 kg/m2 40.48 kg/m2        EXAM:  - GENERAL: NCAT, Appears well nourished   - EYES: EOMI, non-icteric, no proptosis   - Ear/Nose/Throat: NCAT, no visible inflammation or masses   - CARDIOVASCULAR: no cyanosis, no visible JVD   - RESPIRATORY: respiratory effort normal without any distress or labored breathing   - MUSCULOSKELETAL: Normal ROM of neck and upper extremities observed   - SKIN: No rash on face  - NEUROLOGIC:  No facial asymmetry (Cranial nerve 7 motor function), No gaze palsy   - PSYCHIATRIC: Normal affect, Normal insight and judgement      Assessment/Plan:   1.  Acquired hypothyroidism  Labs were good 6/28/22 on that regimen  So go back to 75mcg of Tirosint (can't tolerate generic or other brands)  But the question is what else she was on - amour or some compounded med? ? Advised I don't do compounding meds, but amour is ok  To get that info for me  Likely need to recheck levels in 2 mo    Orders Placed This Encounter    Tirosint 75 mcg cap     Sig: One tablet by mouth once a day     Dispense:  90 Capsule     Refill:  3     BRAND NAME MEDICALLY NECESSARY          Follow-up and Dispositions    Return in about 2 months (around 10/16/2022).

## 2022-08-17 ENCOUNTER — TELEPHONE (OUTPATIENT)
Dept: ENDOCRINOLOGY | Age: 72
End: 2022-08-17

## 2022-08-17 NOTE — TELEPHONE ENCOUNTER
Appears got compounded thyroid med from [facility] - call for information on this  Or maybe it was just \"armour' but does not come in 10 or 20 doses

## 2022-08-26 ENCOUNTER — TELEPHONE (OUTPATIENT)
Dept: ENDOCRINOLOGY | Age: 72
End: 2022-08-26

## 2022-08-26 NOTE — TELEPHONE ENCOUNTER
I called Ms. Jennifer and relayed the message from Dr. Nicol Prieto.  She said she couldn't think today because they had to put their dog down today but she will give me a call back on Monday Terrilee Cam

## 2022-08-26 NOTE — TELEPHONE ENCOUNTER
Let know we got her Rx history form [address] [place] [address]  It appears she was on compound thyroid med in early 2021, but since then has been the tirosin PLUS amour 30mg + 15mg. She had mentioned 30mg BID but not what records are showing. Does she need refills on this.   Her labs on this regimen were normal not long before our appt so I was ok Rx what she had been on (it was just a question on the armour)

## 2022-09-11 ENCOUNTER — TELEPHONE (OUTPATIENT)
Dept: ENDOCRINOLOGY | Age: 72
End: 2022-09-11

## 2022-09-12 NOTE — TELEPHONE ENCOUNTER
I attempted to call MsIzzy Jennifer and reached a voice mail. I left a message asking her to give me a call back.   Trev Kelley

## 2022-09-19 DIAGNOSIS — M17.0 BILATERAL PRIMARY OSTEOARTHRITIS OF KNEE: Primary | ICD-10-CM

## 2022-09-20 ENCOUNTER — TELEPHONE (OUTPATIENT)
Dept: ORTHOPEDIC SURGERY | Age: 72
End: 2022-09-20

## 2022-09-20 ENCOUNTER — OFFICE VISIT (OUTPATIENT)
Dept: ORTHOPEDIC SURGERY | Age: 72
End: 2022-09-20
Payer: MEDICARE

## 2022-09-20 ENCOUNTER — HOSPITAL ENCOUNTER (OUTPATIENT)
Dept: GENERAL RADIOLOGY | Age: 72
Discharge: HOME | End: 2022-09-20
Payer: MEDICARE

## 2022-09-20 VITALS
OXYGEN SATURATION: 97 % | DIASTOLIC BLOOD PRESSURE: 62 MMHG | SYSTOLIC BLOOD PRESSURE: 145 MMHG | WEIGHT: 251.2 LBS | HEART RATE: 68 BPM | TEMPERATURE: 97.5 F | BODY MASS INDEX: 41.17 KG/M2

## 2022-09-20 DIAGNOSIS — M17.0 BILATERAL PRIMARY OSTEOARTHRITIS OF KNEE: Primary | ICD-10-CM

## 2022-09-20 DIAGNOSIS — M17.0 BILATERAL PRIMARY OSTEOARTHRITIS OF KNEE: ICD-10-CM

## 2022-09-20 PROCEDURE — 73560 X-RAY EXAM OF KNEE 1 OR 2: CPT

## 2022-09-20 PROCEDURE — 3017F COLORECTAL CA SCREEN DOC REV: CPT | Performed by: ORTHOPAEDIC SURGERY

## 2022-09-20 PROCEDURE — G9899 SCRN MAM PERF RSLTS DOC: HCPCS | Performed by: ORTHOPAEDIC SURGERY

## 2022-09-20 PROCEDURE — G8432 DEP SCR NOT DOC, RNG: HCPCS | Performed by: ORTHOPAEDIC SURGERY

## 2022-09-20 PROCEDURE — 1101F PT FALLS ASSESS-DOCD LE1/YR: CPT | Performed by: ORTHOPAEDIC SURGERY

## 2022-09-20 PROCEDURE — G8427 DOCREV CUR MEDS BY ELIG CLIN: HCPCS | Performed by: ORTHOPAEDIC SURGERY

## 2022-09-20 PROCEDURE — 1090F PRES/ABSN URINE INCON ASSESS: CPT | Performed by: ORTHOPAEDIC SURGERY

## 2022-09-20 PROCEDURE — G8417 CALC BMI ABV UP PARAM F/U: HCPCS | Performed by: ORTHOPAEDIC SURGERY

## 2022-09-20 PROCEDURE — G8536 NO DOC ELDER MAL SCRN: HCPCS | Performed by: ORTHOPAEDIC SURGERY

## 2022-09-20 PROCEDURE — 1123F ACP DISCUSS/DSCN MKR DOCD: CPT | Performed by: ORTHOPAEDIC SURGERY

## 2022-09-20 PROCEDURE — G8399 PT W/DXA RESULTS DOCUMENT: HCPCS | Performed by: ORTHOPAEDIC SURGERY

## 2022-09-20 PROCEDURE — 99213 OFFICE O/P EST LOW 20 MIN: CPT | Performed by: ORTHOPAEDIC SURGERY

## 2022-09-20 RX ORDER — MELOXICAM 15 MG/1
15 TABLET ORAL DAILY
Qty: 60 TABLET | Refills: 1 | Status: SHIPPED | OUTPATIENT
Start: 2022-09-20 | End: 2022-10-19

## 2022-09-20 NOTE — TELEPHONE ENCOUNTER
Returned patient's phone call about getting a referral to physical therapy for both of her knees be sent over to [facility].  The fax number is [phone] and the phone number is [phone]

## 2022-09-20 NOTE — LETTER
9/20/2022    Patient: Governor Arias   YOB: 1950   Date of Visit: 9/20/2022     Bebeto Saravia, [address] [address]  [facility] [facility] [address]    Dear Bebeto Saravia DO,      Thank you for referring Ms. All Glass to [facility] for evaluation. My notes for this consultation are attached. If you have questions, please do not hesitate to call me. I look forward to following your patient along with you.       Sincerely,    Ky Valdez DO

## 2022-09-20 NOTE — PROGRESS NOTES
9/20/2022      CC: right knee pain    HPI:      This is a 74y.o. year old female who presents for a follow up visit. The patient was last seen and diagnosed with right knee osteoarthritis. The patient's treatments since the most recent visit have comprised of injections, pain medications. The patient has had no relief of the chief complaint. PMH:  Past Medical History:   Diagnosis Date    BPPV (benign paroxysmal positional vertigo)     Chronic fatigue syndrome 3/16/2012    Fibromyalgia     BRAVO on CPAP     BiPAP    Other dyspnea and respiratory abnormality     Palpitations     Prediabetes     Skin cancer 3/16/2012    Unspecified hypothyroidism     goiter    Vitamin D deficiency        PSxHx:  Past Surgical History:   Procedure Laterality Date    HX BREAST LUMPECTOMY  7/2013    right breast    HX TONSILLECTOMY      HX UROLOGICAL      urethral opening was widened       Meds:    Current Outpatient Medications:     meloxicam (MOBIC) 15 mg tablet, Take 1 Tablet by mouth daily. , Disp: 60 Tablet, Rfl: 1    Tirosint 75 mcg cap, One tablet by mouth once a day, Disp: 90 Capsule, Rfl: 3    fluticasone propionate (FLONASE) 50 mcg/actuation nasal spray, 2 Sprays by Both Nostrils route daily. , Disp: , Rfl:     acetaminophen (TYLENOL) 325 mg tablet, Take  by mouth every four (4) hours as needed for Pain., Disp: , Rfl:     ascorbic acid, vitamin C, (VITAMIN C) 250 mg tablet, Take  by mouth., Disp: , Rfl:     zinc 50 mg tab tablet, Take  by mouth daily. , Disp: , Rfl:     magnesium glycinate 100 mg magnesium cap, 240 mg., Disp: , Rfl:     OTHER,NON-FORMULARY,, 100 mg. 5 HYTDROXYTRYPTOPHAN, Disp: , Rfl:     OTHER,NON-FORMULARY,, 1 mg. 5 MTHF, Disp: , Rfl:     OTHER,NON-FORMULARY,, PERMA CLEAR, Disp: , Rfl:     tolnaftate (TINACTIN) 1 % external solution, Apply  to affected area nightly., Disp: , Rfl:     bismuth subsalicylate (PEPTO-BISMOL MAXIMUM STRENGTH) 525 mg/15 mL susp, Take 5 mL by mouth as needed. , Disp: , Rfl: OTHER, as needed. bio-gest, Disp: , Rfl:     multivitamin (ONE A DAY) tablet, Take 1 Tablet by mouth daily. , Disp: , Rfl:     thyroid, Pork, (ARMOUR) 30 mg tablet, Take 30 mg by mouth in the morning. Pt takes on pill at 8 am and the second at 1pm (Patient not taking: Reported on 8/16/2022), Disp: , Rfl:     OTHER, cbd oil 6 drops twice a day, Disp: , Rfl:     OTHER, Cbd cream on knees twice a day for knee pain, Disp: , Rfl:     VITAMIN A PO, Take 3,000 mg by mouth daily. , Disp: , Rfl:     cholecalciferol, VITAMIN D3, (VITAMIN D3) 5,000 unit tab tablet, 45382 iu (in drop form), K2, Disp: , Rfl:     All:  Allergies   Allergen Reactions    Lidocaine Other (comments)     Body Shakes    Adhes. Band-Tape-Benzalkonium Swelling    Adhesive Swelling    Dairy Aid [Lactase] Unknown (comments)    Gluten Diarrhea     \"brain fog\", aching    Iodinated Contrast Media Other (comments)     Gets dizzy and shakey    Milk Containing Products Other (comments)     Aches and stomach cramps    Motrin [Ibuprofen] Unknown (comments)    Other Medication Other (comments)     Super sensitive to all medication    Soy Unknown (comments)    Sulfa (Sulfonamide Antibiotics) Unable to Obtain    Trulicity [Dulaglutide] Nausea Only     Stomach discomfort    Wheat Unknown (comments)       Social Hx:  Social History     Socioeconomic History    Marital status:    Tobacco Use    Smoking status: Never    Smokeless tobacco: Never   Vaping Use    Vaping Use: Never used   Substance and Sexual Activity    Alcohol use: Yes     Alcohol/week: 1.0 standard drink     Types: 1 Glasses of wine per week     Comment: Rare 0.5 glass of wine    Drug use: No    Sexual activity: Not Currently     Partners: Male   Social History Narrative    Lives in [facility] with . Used to work as a  for [employer].        Family Hx:  Family History   Problem Relation Age of Onset    Coronary Art Dis Mother     Thyroid Disease Mother     Diabetes Mother Cancer Mother         Multiple Myeloma    Cancer Father         Colon cancer    OSTEOARTHRITIS Father     Heart Disease Father     High Cholesterol Brother     Thyroid Disease Brother         thyroid cancer    Cancer Brother         Thyroid and Skin    Diabetes Maternal Aunt     Hypertension Other     Heart Disease Other     Depression Other     Anxiety Other     Cancer Sister         Skin    Cancer Brother         Multiple Myeloma and skin    Cancer Brother         Skin         Review of Systems:       General: Denies headache, lethargy, fever, weight loss  Ears/Nose/Throat: Denies ear discharge, drainage, nosebleeds, hoarse voice, dental problems  Cardiovascular: Denies chest pain, shortness of breath  Lungs: Denies chest pain, breathing problems, wheezing, pneumonia  Stomach: Denies stomach pain, heartburn, constipation, irritable bowel  Skin: Denies rash, sores, open wounds  Musculoskeletal: right knee pain  Genitourinary: Denies dysuria, hematuria, polyuria  Gastrointestinal: Denies constipation, obstipation, diarrhea  Neurological: Denies changes in sight, smell, hearing, taste, seizures. Denies loss of consciousness.   Psychiatric: Denies depression, sleep pattern changes, anxiety, change in personality  Endocrine: Denies mood swings, heat or cold intolerance  Hematologic/Lymphatic: Denies anemia, purpura, petechia  Allergic/Immunologic: Denies swelling of throat, pain or swelling at lymph nodes      Physical Examination:    Visit Vitals  BP (!) 145/62   Pulse 68   Temp 97.5 °F (36.4 °C)   Wt 251 lb 3.2 oz (113.9 kg)   SpO2 97%   BMI 41.17 kg/m²        General: AOX3, no apparent distress  Psychiatric: mood and affect appropriate  Lungs: breathing is symmetric and unlabored bilaterally  Heart: regular rate and rhythm  Abdomen: no guarding  Head: normocephalic, atraumatic  Skin: No significant abnormalities, good turgor  Sensation intact to light touch: C5-T1 dermatomes  Muscular exam: 5/5 strength in all major muscle groups unless noted in specialty exam.    Extremities        Left lower extremity:  No gross deformity. No restriction to range of motion of the hip, knee, ankle. Muscle bulk is appropriate without wasting. Sensation is intact to light touch in the L1-S1 dermatomes. Capillary refill is less than 2 seconds in the fingers. Strength testing is 5/5 at the major muscle groups of the hip, knee, ankle. Right lower extremity: Knee is noted to have a mild effusion. Medial joint line tenderness to palpation with a negative deformity. Patellar crepitus with range of motion is noted. Range of motion is 0-110. Sensation is intact to light touch L1-S1 dermatomes. Knee flexion and extension strength is 5/5 with Tibialis anterior, EHL, FHL being 5/5 as well. No other gross deformity or deficit is noted. Diagnostics:    Pertinent Diagnostics: Xrays are available of the right knee, they indicate severe osteoarthritis of the knee joint, no significant other findings, no other osseus abnormalities, fractures, or dislocations. Assessment: Right knee Osteoarthritis  Plan: This patient I had a long discussion regarding treatment options. We went over the nonoperative options, continued medications, injections of multiple types, bracing, physical therapy, maintenance of ideal body weight. Patient has elected to proceed with total knee replacement after she loses 10 lbs. We did discuss the risks of surgery which include but are not limited to infection, nerve or blood vessel damage, failure of fixation, failure of any possible implant, need for reoperation, postoperative pain and swelling, intra-or postoperative fracture, postoperative dislocation, leg length inequality, need for reoperation, implant failure, death, disability, organ dysfunction, wound healing issues, DVT, PE, and the need for further procedures.   The patient did freely state their understanding and satisfaction with our discussion. We will proceed after medical clearances. The patient was counseled at length about the risks of amisha Covid-19 during their perioperative period and any recovery window from their procedure. The patient was made aware that amisha Covid-19  may worsen their prognosis for recovering from their procedure and lend to a higher morbidity and/or mortality risk. All material risks, benefits, and reasonable alternatives including postponing the procedure were discussed. The patient does  wish to proceed with the procedure at this time. Ms. Jong Valenzuela has a reminder for a \"due or due soon\" health maintenance. I have asked that she contact her primary care provider for follow-up on this health maintenance.

## 2022-09-20 NOTE — TELEPHONE ENCOUNTER
Patient called to provide the information about her physical therapist. She is being seeing at [address] [place] [address]. The fax number is [phone] and the phone number is [phone].

## 2022-09-23 ENCOUNTER — TELEPHONE (OUTPATIENT)
Dept: ORTHOPEDIC SURGERY | Age: 72
End: 2022-09-23

## 2022-09-23 NOTE — TELEPHONE ENCOUNTER
Patient would like a referral to PT for both knees to be faxed over to the [address] [place] [address] location of A PT. The fax number is [phone] and the phone number is [phone].

## 2022-09-26 ENCOUNTER — TELEPHONE (OUTPATIENT)
Dept: ENDOCRINOLOGY | Age: 72
End: 2022-09-26

## 2022-09-26 DIAGNOSIS — E03.9 ACQUIRED HYPOTHYROIDISM: Primary | ICD-10-CM

## 2022-09-26 NOTE — TELEPHONE ENCOUNTER
I attempted to call Ms. Rodriguez back and reached her voice mail. I left a message relaying the information from Dr. Mackenzie Barajas and said to give us a call back with any questions.   Allie Cheadle

## 2022-09-26 NOTE — TELEPHONE ENCOUNTER
I returned this call and Ms. Olive Saeed said that she does not feel well. She is very tired, droopy, weak and can't think straight. She is taking 75 mcg of Tirosint and 30 of [place]. She would like some further testing to see if she needs more thyroid medication or is something else going on.   Mirza Vasquez

## 2022-09-26 NOTE — TELEPHONE ENCOUNTER
9/26/2022  9:01 AM    Pt called and left message at 3.51 on Friday stating that her thyroid medication was recently changed and that she is now feeling weak and tired and confused. She would like to have a new thyroid panel done.  Please call her back at [phone]

## 2022-09-26 NOTE — TELEPHONE ENCOUNTER
9/26/2022  3:09 PM      Pt left voice mail today at 2:22 pm.Pt stated she would like to have an a1c test done. Pt stated she have been feeling very dopey,really sleepy and really light headed. TL#[phone]      Thanks,  Estela Kenney

## 2022-09-26 NOTE — TELEPHONE ENCOUNTER
I ordered a TSH to do although it is a little early to see the full affect of the last dose change  A1c should be done by PCP

## 2022-09-28 DIAGNOSIS — E03.9 ACQUIRED HYPOTHYROIDISM: ICD-10-CM

## 2022-10-04 ENCOUNTER — HOSPITAL ENCOUNTER (OUTPATIENT)
Dept: MAMMOGRAPHY | Age: 72
Discharge: HOME | End: 2022-10-04
Attending: SPECIALIST
Payer: MEDICARE

## 2022-10-04 DIAGNOSIS — Z78.0 MENOPAUSE: ICD-10-CM

## 2022-10-04 PROCEDURE — 77080 DXA BONE DENSITY AXIAL: CPT

## 2022-10-14 LAB
FT4I SERPL CALC-MCNC: 1.3 (ref 1.2–4.9)
T3 SERPL-MCNC: 72 NG/DL (ref 71–180)
T3RU NFR SERPL: 24 % (ref 24–39)
T4 SERPL-MCNC: 5.3 UG/DL (ref 4.5–12)
TSH SERPL DL<=0.005 MIU/L-ACNC: 13.2 UIU/ML (ref 0.45–4.5)

## 2022-10-19 ENCOUNTER — OFFICE VISIT (OUTPATIENT)
Dept: ENDOCRINOLOGY | Age: 72
End: 2022-10-19
Payer: MEDICARE

## 2022-10-19 VITALS — BODY MASS INDEX: 41.65 KG/M2 | HEIGHT: 65 IN | WEIGHT: 250 LBS

## 2022-10-19 DIAGNOSIS — E03.9 ACQUIRED HYPOTHYROIDISM: Primary | ICD-10-CM

## 2022-10-19 PROCEDURE — G8399 PT W/DXA RESULTS DOCUMENT: HCPCS | Performed by: INTERNAL MEDICINE

## 2022-10-19 PROCEDURE — 1101F PT FALLS ASSESS-DOCD LE1/YR: CPT | Performed by: INTERNAL MEDICINE

## 2022-10-19 PROCEDURE — 1090F PRES/ABSN URINE INCON ASSESS: CPT | Performed by: INTERNAL MEDICINE

## 2022-10-19 PROCEDURE — G8427 DOCREV CUR MEDS BY ELIG CLIN: HCPCS | Performed by: INTERNAL MEDICINE

## 2022-10-19 PROCEDURE — G8536 NO DOC ELDER MAL SCRN: HCPCS | Performed by: INTERNAL MEDICINE

## 2022-10-19 PROCEDURE — 99214 OFFICE O/P EST MOD 30 MIN: CPT | Performed by: INTERNAL MEDICINE

## 2022-10-19 PROCEDURE — G8432 DEP SCR NOT DOC, RNG: HCPCS | Performed by: INTERNAL MEDICINE

## 2022-10-19 PROCEDURE — 3017F COLORECTAL CA SCREEN DOC REV: CPT | Performed by: INTERNAL MEDICINE

## 2022-10-19 PROCEDURE — G8417 CALC BMI ABV UP PARAM F/U: HCPCS | Performed by: INTERNAL MEDICINE

## 2022-10-19 PROCEDURE — G9899 SCRN MAM PERF RSLTS DOC: HCPCS | Performed by: INTERNAL MEDICINE

## 2022-10-19 PROCEDURE — 1123F ACP DISCUSS/DSCN MKR DOCD: CPT | Performed by: INTERNAL MEDICINE

## 2022-10-19 RX ORDER — THYROID, PORCINE 30 MG/1
TABLET ORAL
Qty: 60 TABLET | Refills: 5 | Status: SHIPPED | OUTPATIENT
Start: 2022-10-19

## 2022-10-19 NOTE — PROGRESS NOTES
Chief Complaint   Patient presents with    Thyroid Problem         * Since Last Visit: -8/16/2022     Got back on tirosint 75mcg  As far as the armour - is on 30mg, not 30mg BID   Still not clear what on before - may have been 30mg BID plus 15mg   She was supposed to get back to us (see messages) and did not so why no new Rx for armour sent (and not taking it)  Does not feel as good    General:  From initial visit 8/16/22  Dx hypothyroidism since '90s  On current regimen   Over the years high variability - does great on tirosint   But GYN took off Tirosint and put on generic  - feels horrible on generic   Has been on naturthroid (suddenly felt bad on it) and switched to armour > 6 mo ago      On armour 30gm BID (~100mcg T4 equivalent) + 75mcg generic T4 == total 175mcg   Wt based is 182mcg       History   Start Date End Date   thyroid, Pork, (ARMOUR) 30 mg tablet 03/17/22 --   [place] Thyroid 15 mg tablet 10/20/21 08/03/22   thyroid, pork, (NATURE-THROID) 81.25 mg tab 06/07/18 12/07/21   Nature-Throid 97.5 mg tab 05/11/20 07/27/20   thyroid, Pork, (NATURE-THROID/WESTHROID) 32.5 mg tablet 06/07/18 06/17/20   NATURE-THROID 65 mg tab 09/21/15 07/22/16   NATURE-THROID 81.25 mg tab 09/21/15 07/22/16   NATURE-THROID 130 mg tab 04/07/15 09/21/15   NATURE-THROID 97.5 mg tab 04/07/15 09/21/15   NATURE-THROID 48.75 mg tab 08/20/15 09/21/15   NATURE-THROID 65 mg tab 08/20/15 09/21/15   NATURE-THROID 162.5 mg tab 02/12/15 05/28/15   thyroid, Pork, (ARMOUR) 60 mg tablet 11/05/12 02/19/13   thyroid, Pork, (ARMOUR THYROID) 30 mg tablet 04/11/12 04/17/12   Thyroid, Pork, (ARMOUR THYROID) 120 mg Tab 03/01/12 04/11/12       Thyroid Symptoms  **has decreased energy**, denies change in weight, denies change in appetite, denies sleep issues, denies hair changes, no skin changes, denies sweats, denies hot/cold intolerance, denies tremor, denies palpitations, denies change in bowels, no change in menses, denies mood changes      Neck Symptoms  denies dysphagia, denies anterior neck pain, denies neck swelling, notes no nodules      Labs/Studies    6/8/15 Thyroid US: The thyroid texture is heterogeneous. No definable nodule is identified,  however. The right lobe measures 4.2 x 1.7 x 1.6 cm and the left lobe measures 3.9 x 1.2 x 0.9 cm. The isthmus measures 3 mm. 2/14/22 FT4 1.2, TSH 1.7, FT3 3.2    6/28/22 FSH 20, est 19.9, prog 0.1, TSH 2.0, FT4 1.0      Lab Results   Component Value Date/Time    TSH 13.200 (H) 10/13/2022 11:17 AM    T3, total 72 10/13/2022 11:17 AM    T3 Uptake 24 10/13/2022 11:17 AM    T4, Free 1.0 03/17/2022 11:45 AM    T4, Total 5.3 10/13/2022 11:17 AM    Thyroid peroxidase Ab 11 05/28/2015 12:13 PM        Lab Results   Component Value Date/Time    TSH 13.200 (H) 10/13/2022 11:17 AM    TSH 1.45 03/17/2022 11:45 AM    TSH 8.75 (H) 12/23/2021 01:35 PM    TSH 3.450 06/05/2020 11:11 AM    TSH 9.860 (H) 09/15/2015 12:00 AM    TSH 9.520 (H) 09/15/2015 12:00 AM    TSH 2.780 05/28/2015 12:13 PM    TSH 1.980 04/11/2012 12:44 PM          Past Medical History:   Diagnosis Date    BPPV (benign paroxysmal positional vertigo)     Chronic fatigue syndrome 3/16/2012    Fibromyalgia     BRAVO on CPAP     BiPAP    Other dyspnea and respiratory abnormality     Palpitations     Prediabetes     Skin cancer 3/16/2012    Unspecified hypothyroidism     goiter    Vitamin D deficiency       Height 5' 5\" (1.651 m), weight 250 lb (113.4 kg). Weight Metrics 10/19/2022 9/20/2022 8/16/2022 8/3/2022 6/10/2022 5/24/2022 4/13/2022   Weight 250 lb 251 lb 3.2 oz 248 lb 250 lb 251 lb 248 lb 250 lb   BMI 41.6 kg/m2 41.17 kg/m2 40.64 kg/m2 40.97 kg/m2 41.13 kg/m2 40.64 kg/m2 40.97 kg/m2        EXAM:  - not done today       Assessment/Plan:   1.  Acquired hypothyroidism  Labs were good 6/28/22 on that regimen -what that regimen was, was not clear  Got back on tirosint 75mcg, but not the full armour dose---so why TSH is high  Increase the armour to 30mg BID Repeat labs in 2 months - add additional 15mg if needed (not clear if was on 30x2 +15 or 30+15)  Then follow up in 4 months to recheck    (Compounding thyroid med was confirmed from 2021, not this year; patient aware I don't do compounding thyroid meds)      Orders Placed This Encounter    TSH 3RD GENERATION     Standing Status:   Future     Standing Expiration Date:   4/19/2023    TSH 3RD GENERATION     Standing Status:   Future     Standing Expiration Date:   4/19/2023    [place] Thyroid 30 mg tablet     Sig: One tablet twice a day by mouth     Dispense:  60 Tablet     Refill:  5            Follow-up and Dispositions    Return in about 4 months (around 2/19/2023).

## 2022-10-19 NOTE — LETTER
10/24/2022    Patient: Placido Goodrich   YOB: 1950   Date of Visit: 10/19/2022     Chun Mederos, [address]    Dear Chun Mederos DO,      Thank you for referring Ms. Trish Kaufman to [facility] for evaluation. My notes for this consultation are attached. If you have questions, please do not hesitate to call me. I look forward to following your patient along with you.       Sincerely,    Eunice Vickers MD

## 2022-10-19 NOTE — PATIENT INSTRUCTIONS
Increase [place] 30mg to twice a day  Continue the tirosint 75mcg daily    Get lab done in 2 months (~a week before dianne)    Get labs done 2-3 days before 4 month follow up visit

## 2022-11-17 ENCOUNTER — PATIENT MESSAGE (OUTPATIENT)
Dept: PRIMARY CARE CLINIC | Age: 72
End: 2022-11-17

## 2022-11-19 DIAGNOSIS — E03.9 ACQUIRED HYPOTHYROIDISM: ICD-10-CM

## 2022-12-19 NOTE — TELEPHONE ENCOUNTER
LVM for patient to C/B to provide clarification on the need for a PT order if she is moving forward with surgery. Patient called with concern regarding her PICC line. She states she occasionally gets chills in her arm and she is concerned that PICC infection will return. [place] nurse came today to change PICC dressing and assess line, also abad labs. Patient will take temp at least once per day, will report any temp rising. Will continue to monitor. Reassurance given.

## 2022-12-21 ENCOUNTER — OFFICE VISIT (OUTPATIENT)
Dept: PRIMARY CARE CLINIC | Age: 72
End: 2022-12-21
Payer: MEDICARE

## 2022-12-21 ENCOUNTER — HOSPITAL ENCOUNTER (OUTPATIENT)
Dept: GENERAL RADIOLOGY | Age: 72
Discharge: HOME | End: 2022-12-21
Attending: FAMILY MEDICINE
Payer: MEDICARE

## 2022-12-21 VITALS
SYSTOLIC BLOOD PRESSURE: 145 MMHG | TEMPERATURE: 97.4 F | OXYGEN SATURATION: 95 % | HEART RATE: 61 BPM | RESPIRATION RATE: 18 BRPM | DIASTOLIC BLOOD PRESSURE: 80 MMHG | WEIGHT: 247 LBS | BODY MASS INDEX: 41.15 KG/M2 | HEIGHT: 65 IN

## 2022-12-21 DIAGNOSIS — Z00.00 MEDICARE ANNUAL WELLNESS VISIT, SUBSEQUENT: ICD-10-CM

## 2022-12-21 DIAGNOSIS — R73.03 PREDIABETES: ICD-10-CM

## 2022-12-21 DIAGNOSIS — R06.2 WHEEZING: ICD-10-CM

## 2022-12-21 DIAGNOSIS — Z12.31 ENCOUNTER FOR SCREENING MAMMOGRAM FOR MALIGNANT NEOPLASM OF BREAST: ICD-10-CM

## 2022-12-21 DIAGNOSIS — R41.3 MEMORY CHANGE: Primary | ICD-10-CM

## 2022-12-21 DIAGNOSIS — E78.00 PURE HYPERCHOLESTEROLEMIA: ICD-10-CM

## 2022-12-21 PROCEDURE — 3017F COLORECTAL CA SCREEN DOC REV: CPT | Performed by: FAMILY MEDICINE

## 2022-12-21 PROCEDURE — G8417 CALC BMI ABV UP PARAM F/U: HCPCS | Performed by: FAMILY MEDICINE

## 2022-12-21 PROCEDURE — 1123F ACP DISCUSS/DSCN MKR DOCD: CPT | Performed by: FAMILY MEDICINE

## 2022-12-21 PROCEDURE — G8399 PT W/DXA RESULTS DOCUMENT: HCPCS | Performed by: FAMILY MEDICINE

## 2022-12-21 PROCEDURE — G9899 SCRN MAM PERF RSLTS DOC: HCPCS | Performed by: FAMILY MEDICINE

## 2022-12-21 PROCEDURE — G0439 PPPS, SUBSEQ VISIT: HCPCS | Performed by: FAMILY MEDICINE

## 2022-12-21 PROCEDURE — G8511 SCR DEP POS, NO PLAN DOC RNG: HCPCS | Performed by: FAMILY MEDICINE

## 2022-12-21 PROCEDURE — 1101F PT FALLS ASSESS-DOCD LE1/YR: CPT | Performed by: FAMILY MEDICINE

## 2022-12-21 PROCEDURE — G8427 DOCREV CUR MEDS BY ELIG CLIN: HCPCS | Performed by: FAMILY MEDICINE

## 2022-12-21 PROCEDURE — G8536 NO DOC ELDER MAL SCRN: HCPCS | Performed by: FAMILY MEDICINE

## 2022-12-21 PROCEDURE — 1090F PRES/ABSN URINE INCON ASSESS: CPT | Performed by: FAMILY MEDICINE

## 2022-12-21 PROCEDURE — 99214 OFFICE O/P EST MOD 30 MIN: CPT | Performed by: FAMILY MEDICINE

## 2022-12-21 PROCEDURE — 71046 X-RAY EXAM CHEST 2 VIEWS: CPT

## 2022-12-21 RX ORDER — ALBUTEROL SULFATE 90 UG/1
1 AEROSOL, METERED RESPIRATORY (INHALATION)
Qty: 18 G | Refills: 0 | Status: SHIPPED | OUTPATIENT
Start: 2022-12-21

## 2022-12-21 NOTE — PROGRESS NOTES
Bogdan Fischer, [address] [facility]    Date of visit: 12/21/2022       This is an Subsequent Medicare Annual Wellness Visit (AWV), (Performed more than 12 months after effective date of Medicare Part B enrollment and 12 months after last preventive visit, Once in a lifetime)    I have reviewed the patient's medical history in detail and updated the computerized patient record. Tiara Abad is a 74 y.o. female   History obtained from: the patient. Concerns today   States she has trouble remembering things. States she feels \"asleep or daydreaming\" when doing things. She has seen Neurologist Dr. Tin Jerome for concussion symptoms. Neurology wants her to go to a hyperbaric chamber for \"brain damage and inflammation. \" Was told to do eye exercises and leg exercises but can't remember to do it. States it is interfering with her life. States the hyperbaric oxygen is not covered by insurance and will cost 8,000+. She is wheezing today. No difficulty breathing. No hx of lung problems. No cough, congestion, sore throat, ear pain, fever, chills. No recent sick contacts. States this has been ongoing for many months. Has not seen Pulm. Does not have inhaler.      History     Patient Active Problem List   Diagnosis Code    Obesity, unspecified E66.9    Palpitations R00.2    Impaired glucose tolerance R73.02    Skin cancer C44.90    Chronic fatigue syndrome G93.32    Fibromyalgia M79.7    Chest pain, unspecified R07.9    Iatrogenic hyperthyroidism E05.80    Supraventricular tachycardia, paroxysmal (HCC) I47.1    Unspecified vitamin D deficiency E55.9    Anxiety disorder F41.9    Multinodular goiter E04.2    Hypothyroidism (acquired) E03.9    Carpal tunnel syndrome, bilateral upper limbs G56.03    Cervical radiculopathy due to degenerative joint disease of spine M47.22    Obesity, morbid (HCC) E66.01     Past Medical History:   Diagnosis Date    BPPV (benign paroxysmal positional vertigo)     Chronic fatigue syndrome 3/16/2012    Fibromyalgia     BRAVO on CPAP     BiPAP    Other dyspnea and respiratory abnormality     Palpitations     Prediabetes     Skin cancer 3/16/2012    Unspecified hypothyroidism     goiter    Vitamin D deficiency       Past Surgical History:   Procedure Laterality Date    HX BREAST LUMPECTOMY  7/2013    right breast    HX TONSILLECTOMY      HX UROLOGICAL      urethral opening was widened     Allergies   Allergen Reactions    Lidocaine Other (comments)     Body Shakes    Adhes. Band-Tape-Benzalkonium Swelling    Adhesive Swelling    Dairy Aid [Lactase] Unknown (comments)    Gluten Diarrhea     \"brain fog\", aching    Iodinated Contrast Media Other (comments)     Gets dizzy and shakey    Milk Containing Products Other (comments)     Aches and stomach cramps    Motrin [Ibuprofen] Unknown (comments)    Other Medication Other (comments)     Super sensitive to all medication    Soy Unknown (comments)    Sulfa (Sulfonamide Antibiotics) Unable to Obtain    Trulicity [Dulaglutide] Nausea Only     Stomach discomfort    Wheat Unknown (comments)     Current Outpatient Medications   Medication Sig Dispense Refill    albuterol (PROVENTIL HFA, VENTOLIN HFA, PROAIR HFA) 90 mcg/actuation inhaler Take 1 Puff by inhalation every four (4) hours as needed for Wheezing. 18 g 0    [place] Thyroid 30 mg tablet One tablet twice a day by mouth 60 Tablet 5    Tirosint 75 mcg cap One tablet by mouth once a day 90 Capsule 3    fluticasone propionate (FLONASE) 50 mcg/actuation nasal spray 2 Sprays by Both Nostrils route daily. acetaminophen (TYLENOL) 325 mg tablet Take  by mouth every four (4) hours as needed for Pain. ascorbic acid, vitamin C, (VITAMIN C) 250 mg tablet Take  by mouth. zinc 50 mg tab tablet Take  by mouth daily.       magnesium glycinate 100 mg magnesium cap 240 mg.      OTHER,NON-FORMULARY, 100 mg. 5 HYTDROXYTRYPTOPHAN      OTHER,NON-FORMULARY, 1 mg. 5 MTHF      bismuth subsalicylate (PEPTO-BISMOL MAXIMUM STRENGTH) 525 mg/15 mL susp Take 5 mL by mouth as needed. OTHER as needed. bio-gest      multivitamin (ONE A DAY) tablet Take 1 Tablet by mouth daily. OTHER cbd oil 6 drops twice a day      OTHER Cbd cream on knees twice a day for knee pain      cholecalciferol, VITAMIN D3, (VITAMIN D3) 5,000 unit tab tablet 72422 iu (in drop form), K2      OTHER,NON-FORMULARY, PERMA CLEAR      VITAMIN A PO Take 3,000 mg by mouth daily. Family History   Problem Relation Age of Onset    Coronary Art Dis Mother     Thyroid Disease Mother     Diabetes Mother     Cancer Mother         Multiple Myeloma    Cancer Father         Colon cancer    OSTEOARTHRITIS Father     Heart Disease Father     High Cholesterol Brother     Thyroid Disease Brother         thyroid cancer    Cancer Brother         Thyroid and Skin    Diabetes Maternal Aunt     Hypertension Other     Heart Disease Other     Depression Other     Anxiety Other     Cancer Sister         Skin    Cancer Brother         Multiple Myeloma and skin    Cancer Brother         Skin     Social History     Tobacco Use    Smoking status: Never    Smokeless tobacco: Never   Substance Use Topics    Alcohol use: Yes     Alcohol/week: 1.0 standard drink     Types: 1 Glasses of wine per week     Comment: Rare 0.5 glass of wine       Specialists/Care Team   Shannan Padilla has established care with the following healthcare providers:  Patient Care Team:  Greg Cutler DO as PCP - General (Family Medicine)  Greg Cutler DO as PCP - [facility] Cookie Flores Provider  Sony Brower MD as Physician (Sleep Medicine Physician)  Rodgers Aschoff, MD as Physician (Neurology)  Leti Matute MD as Physician (Endocrinology Physician)    Health Risk Assessment     Demographics   female  74 y.o.     Physical Examination     Vitals:    12/21/22 1129   BP: (!) 145/80   Pulse: 61   Resp: 18   Temp: 97.4 °F (36.3 °C) TempSrc: Temporal   SpO2: 95%   Weight: 247 lb (112 kg)   Height: 5' 5\" (1.651 m)     Body mass index is 41.1 kg/m². No results found. Evaluation of Cognitive Function   Mood/affect:  neutral  Orientation: Person, Place, and Time  Appearance: age appropriate  Family member/caregiver input: not present    Additional exam if indicated for problems addressed today:  WNWD, NAD  RRR, no murmurs  Inspiratory wheezing noted, no crackles, ronchi, rales  Patient is at baseline mentation  Uses cane bilaterally to walk    Advice/Referrals/Counseling (as indicated)   Education and counseling provided for any problems identified above: neuropsych testing    Preventive Services     Female Medicare Wellness Checklist    - Mammogram: Feb 2022  - Colonoscopy: Feb 20221, return in 6-7 years, found polyps  - PAP smear: was seen at [facility], not sure if she had PAP  - DEXA scan: 2022  - Flu vaccine: declines  - Shingles vaccine: has not had, declines  - PNA vaccine: has not had, declines  - Tetanus vaccine: not on file, declines  - Eye exam: 2-3 x has been having trouble with vision, [address] doctor   - Lipid panel: 2021  - Diabetes screening: March 2022, prediabetic  - Lung cancer screening: not indicated    Discussion of Advance Directive   Discussed with Eladia Poe her ability to prepare and advance directive in the case that an injury or illness causes her to be unable to make health care decisions. ACP documents on file. [facility], [facility]. decision maker updated. Assessment/Plan       ICD-10-CM ICD-9-CM    1. Memory change  R41.3 780.93 REFERRAL TO NEUROPSYCHOLOGY      2. Encounter for screening mammogram for malignant neoplasm of breast  Z12.31 V76.12 NIKITA 3D FANTASMA W MAMMO BI SCREENING INCL CAD      3. Wheezing  R06.2 786.07 XR CHEST PA LAT      REFERRAL TO PULMONARY DISEASE      4. Pure hypercholesterolemia  E78.00 272.0 LIPID PANEL      5.  Prediabetes  R73.03 790.29 HEMOGLOBIN A1C WITH EAG      METABOLIC PANEL, COMPREHENSIVE      6. Medicare annual wellness visit, subsequent  Z00.00 V70.0           Orders Placed This Encounter    NIKITA 3D FANTASMA W MAMMO BI SCREENING INCL CAD    XR CHEST PA LAT    LIPID PANEL    HEMOGLOBIN A1C WITH EAG    METABOLIC PANEL, COMPREHENSIVE    [facility] EMPL    [address] [facility] Pulmonary Disease [facility]    albuterol (PROVENTIL HFA, VENTOLIN HFA, PROAIR HFA) 90 mcg/actuation inhaler     Reviewed the forms she brought in. The Neurologist wrote down she should consider a  type practice per the patient and that she would benefit from fixing sleep issues. Discussed I am not a Neurologist. Dr. Hernandez Jasmin saw her in 2021 and suspected ADHD or possible interaction from multiple supplements. She had MRI 05/22 that was normal. Discussed we could refer her to Neuropsych for formal assessment of her memory/ 2nd opinion if interested. It is not clear if this has been done before. She is interested in this. Discussed it can take some time to get in but to call and make appt. She is wheezing on exam today. Given albuterol inhaler and referred to [facility]. Will get Xray.      Constanza Strickland, DO

## 2022-12-21 NOTE — PROGRESS NOTES
Chief Complaint   Patient presents with    Annual Wellness Visit        NN Medicare Wellness Visit      Massiel Zamudio is a 74 y.o. female and presents for Annual Medicare Wellness Visit. Assessment of cognitive impairment: Alert and oriented x yes. Depression Screen:   3 most recent PHQ Screens 12/20/2022   Little interest or pleasure in doing things Nearly every day   Feeling down, depressed, irritable, or hopeless More than half the days   Total Score PHQ 2 5   Trouble falling or staying asleep, or sleeping too much Nearly every day   Feeling tired or having little energy Nearly every day   Poor appetite, weight loss, or overeating Nearly every day   Feeling bad about yourself - or that you are a failure or have let yourself or your family down More than half the days   Trouble concentrating on things such as school, work, reading, or watching TV Nearly every day   Moving or speaking so slowly that other people could have noticed; or the opposite being so fidgety that others notice Several days   Thoughts of being better off dead, or hurting yourself in some way Not at all   PHQ 9 Score 20   How difficult have these problems made it for you to do your work, take care of your home and get along with others Extremely difficult       Fall Risk Assessment:    Fall Risk Assessment, last 12 mths 6/10/2022   Able to walk? Yes   Fall in past 12 months? 1   Do you feel unsteady? 1   Are you worried about falling 1   Is the gait abnormal? 1   Number of falls in past 12 months 0   Fall with injury? -       Abuse Screen:   Abuse Screening Questionnaire 12/20/2022   Do you ever feel afraid of your partner? N   Are you in a relationship with someone who physically or mentally threatens you? N   Is it safe for you to go home? Y       Activities of Daily Living:  Partial assistance.    Requires assistance with: Needs assistance and no ADLs  Patient handle his/her own medications  yes Use of pill box  no  Activities of Daily Living: No flowsheet data found. Health Maintenance:  Daily Aspirin: no and recommended to start daily 81mg  Bone Density: up to date  Glaucoma Screening: yes  Immunizations:    Tetanus: unknown. Influenza: up to date. Shingles: up to date. PPSV-23: up to date. Prevnar-13: up to date. Cancer screening:    Cervical: up to date. Breast: up to date. Colon: up to date. Alcohol Risk Screen:   On any occasion during the past 3 months, have you had more than 3 drinks(female) or 4 drinks (male) containing alcohol in one? No  Do you average more than 7 drinks (female) or 14 drinks (male) per week? No  Type and amount:0    Hearing Loss:  The patient needs further evaluation. denies any hearing loss wears hearing aides    Vision Loss:   Wears glasses, contact lenses, or have any other visual impairment  Yes    Adult Nutrition Screen:  No risk factors noted. none    Advance Care Planning:   End of Life Planning: has an advanced directive - a copy HAS NOT been provided. , has NO advanced directive  - add't info provided, reviewed DNR/DNI and patient is not interested  Vic Mazariegos ACP-Facilitator appointment no      Medications/Allergies: Reviewed with patient  Prior to Admission medications    Medication Sig Start Date End Date Taking? Authorizing Provider   Izabela Thyroid 30 mg tablet One tablet twice a day by mouth 10/19/22  Yes Mango Orozco MD   Tirosint 75 mcg cap One tablet by mouth once a day 8/16/22  Yes Mango Orozco MD   fluticasone propionate (FLONASE) 50 mcg/actuation nasal spray 2 Sprays by Both Nostrils route daily. Yes Provider, Historical   acetaminophen (TYLENOL) 325 mg tablet Take  by mouth every four (4) hours as needed for Pain. Yes Provider, Historical   ascorbic acid, vitamin C, (VITAMIN C) 250 mg tablet Take  by mouth. Yes Provider, Historical   zinc 50 mg tab tablet Take  by mouth daily.    Yes Provider, Historical   magnesium glycinate 100 mg magnesium cap 240 mg. 10/14/20  Yes Provider, Historical   OTHER,NON-FORMULARY, 100 mg. 5 HYTDROXYTRYPTOPHAN   Yes Provider, Historical   OTHER,NON-FORMULARY, 1 mg. 5 MTHF   Yes Provider, Historical   bismuth subsalicylate (PEPTO-BISMOL MAXIMUM STRENGTH) 525 mg/15 mL susp Take 5 mL by mouth as needed. Yes Provider, Historical   OTHER as needed. bio-gest   Yes Provider, Historical   multivitamin (ONE A DAY) tablet Take 1 Tablet by mouth daily. Yes Provider, Historical   OTHER cbd oil 6 drops twice a day   Yes Provider, Historical   OTHER Cbd cream on knees twice a day for knee pain   Yes Provider, Historical   cholecalciferol, VITAMIN D3, (VITAMIN D3) 5,000 unit tab tablet 85017 iu (in drop form), K2   Yes Provider, Historical   OTHER,NON-FORMULARY, [address]    Provider, Historical   VITAMIN A PO Take 3,000 mg by mouth daily. Provider, Historical       Allergies   Allergen Reactions    Lidocaine Other (comments)     Body Shakes    Adhes. Band-Tape-Benzalkonium Swelling    Adhesive Swelling    Dairy Aid [Lactase] Unknown (comments)    Gluten Diarrhea     \"brain fog\", aching    Iodinated Contrast Media Other (comments)     Gets dizzy and shakey    Milk Containing Products Other (comments)     Aches and stomach cramps    Motrin [Ibuprofen] Unknown (comments)    Other Medication Other (comments)     Super sensitive to all medication    Soy Unknown (comments)    Sulfa (Sulfonamide Antibiotics) Unable to Obtain    Trulicity [Dulaglutide] Nausea Only     Stomach discomfort    Wheat Unknown (comments)       Objective: There were no vitals taken for this visit. There is no height or weight on file to calculate BMI. Problem List: Reviewed with patient and discussed risk factors.     Patient Active Problem List   Diagnosis Code    Obesity, unspecified E66.9    Palpitations R00.2    Impaired glucose tolerance R73.02    Skin cancer C44.90    Chronic fatigue syndrome G93.32    Fibromyalgia M79.7    Chest pain, unspecified R07.9    Iatrogenic hyperthyroidism E05.80    Supraventricular tachycardia, paroxysmal (HCC) I47.1    Unspecified vitamin D deficiency E55.9    Anxiety disorder F41.9    Multinodular goiter E04.2    Hypothyroidism (acquired) E03.9    Carpal tunnel syndrome, bilateral upper limbs G56.03    Cervical radiculopathy due to degenerative joint disease of spine M47.22    Obesity, morbid (HCC) E66.01       PSH: Reviewed with patient  Past Surgical History:   Procedure Laterality Date    HX BREAST LUMPECTOMY  7/2013    right breast    HX TONSILLECTOMY      HX UROLOGICAL      urethral opening was widened        SH: Reviewed with patient  Social History     Tobacco Use    Smoking status: Never    Smokeless tobacco: Never   Vaping Use    Vaping Use: Never used   Substance Use Topics    Alcohol use:  Yes     Alcohol/week: 1.0 standard drink     Types: 1 Glasses of wine per week     Comment: Rare 0.5 glass of wine    Drug use: No       FH: Reviewed with patient  Family History   Problem Relation Age of Onset    Coronary Art Dis Mother     Thyroid Disease Mother     Diabetes Mother     Cancer Mother         Multiple Myeloma    Cancer Father         Colon cancer    OSTEOARTHRITIS Father     Heart Disease Father     High Cholesterol Brother     Thyroid Disease Brother         thyroid cancer    Cancer Brother         Thyroid and Skin    Diabetes Maternal Aunt     Hypertension Other     Heart Disease Other     Depression Other     Anxiety Other     Cancer Sister         Skin    Cancer Brother         Multiple Myeloma and skin    Cancer Brother         Skin       Current medical providers:    Patient Care Team:  Greg Cutler DO as PCP - General (Family Medicine)  Greg Cutler DO [facility] - [facility] Empaneled Provider  Sony Brower MD as Physician (Sleep Medicine Physician)  Rodgers Aschoff, MD as Physician (Neurology)  Leti Matute MD as Physician (Endocrinology Physician)    Plan:      No orders of the defined types were placed in this encounter. Health Maintenance   Topic Date Due    Hepatitis C Screening  Never done    COVID-19 Vaccine (1) Never done    DTaP/Tdap/Td series (1 - Tdap) Never done    Colorectal Cancer Screening Combo  Never done    Shingles Vaccine (1 of 2) Never done    Pneumococcal 65+ years (1 - PCV) Never done    Medicare Yearly Exam  Never done    Flu Vaccine (1) Never done    A1C test (Diabetic or Prediabetic)  03/17/2023    Depression Monitoring  12/20/2023    Breast Cancer Screen Mammogram  02/10/2024    Lipid Screen  12/23/2026    Bone Densitometry (Dexa) Screening  Completed          Urinary/ Fecal Incontinence:     Regular physical exercise:     Patient verbalized understanding of information presented. AVS and Medicare Part B Preventive Services Table printed and given to pt and reviewed. See table for findings under Recommendation and Scheduled. All of the patient's questions were answered.

## 2022-12-22 DIAGNOSIS — R74.01 ELEVATION OF LEVELS OF LIVER TRANSAMINASE LEVELS: ICD-10-CM

## 2022-12-22 DIAGNOSIS — R74.8 ELEVATED LIVER ENZYMES: Primary | ICD-10-CM

## 2022-12-22 LAB — TSH SERPL DL<=0.05 MIU/L-ACNC: 1.12 UIU/ML (ref 0.36–3.74)

## 2023-01-19 DIAGNOSIS — E03.9 ACQUIRED HYPOTHYROIDISM: ICD-10-CM

## 2023-02-22 ENCOUNTER — OFFICE VISIT (OUTPATIENT)
Dept: ENDOCRINOLOGY | Age: 73
End: 2023-02-22
Payer: MEDICARE

## 2023-02-22 ENCOUNTER — HOSPITAL ENCOUNTER (OUTPATIENT)
Dept: ULTRASOUND IMAGING | Age: 73
Discharge: HOME | End: 2023-02-22
Attending: FAMILY MEDICINE
Payer: MEDICARE

## 2023-02-22 VITALS
BODY MASS INDEX: 40.48 KG/M2 | WEIGHT: 243 LBS | HEART RATE: 67 BPM | HEIGHT: 65 IN | DIASTOLIC BLOOD PRESSURE: 57 MMHG | SYSTOLIC BLOOD PRESSURE: 125 MMHG

## 2023-02-22 DIAGNOSIS — R53.83 FATIGUE, UNSPECIFIED TYPE: ICD-10-CM

## 2023-02-22 DIAGNOSIS — R74.8 ELEVATED LIVER ENZYMES: ICD-10-CM

## 2023-02-22 DIAGNOSIS — E03.9 ACQUIRED HYPOTHYROIDISM: Primary | ICD-10-CM

## 2023-02-22 PROCEDURE — G8432 DEP SCR NOT DOC, RNG: HCPCS | Performed by: INTERNAL MEDICINE

## 2023-02-22 PROCEDURE — G8399 PT W/DXA RESULTS DOCUMENT: HCPCS | Performed by: INTERNAL MEDICINE

## 2023-02-22 PROCEDURE — 1101F PT FALLS ASSESS-DOCD LE1/YR: CPT | Performed by: INTERNAL MEDICINE

## 2023-02-22 PROCEDURE — G9899 SCRN MAM PERF RSLTS DOC: HCPCS | Performed by: INTERNAL MEDICINE

## 2023-02-22 PROCEDURE — 99214 OFFICE O/P EST MOD 30 MIN: CPT | Performed by: INTERNAL MEDICINE

## 2023-02-22 PROCEDURE — 1090F PRES/ABSN URINE INCON ASSESS: CPT | Performed by: INTERNAL MEDICINE

## 2023-02-22 PROCEDURE — 76705 ECHO EXAM OF ABDOMEN: CPT

## 2023-02-22 PROCEDURE — G8427 DOCREV CUR MEDS BY ELIG CLIN: HCPCS | Performed by: INTERNAL MEDICINE

## 2023-02-22 PROCEDURE — 1123F ACP DISCUSS/DSCN MKR DOCD: CPT | Performed by: INTERNAL MEDICINE

## 2023-02-22 PROCEDURE — G8536 NO DOC ELDER MAL SCRN: HCPCS | Performed by: INTERNAL MEDICINE

## 2023-02-22 PROCEDURE — 3017F COLORECTAL CA SCREEN DOC REV: CPT | Performed by: INTERNAL MEDICINE

## 2023-02-22 PROCEDURE — G8417 CALC BMI ABV UP PARAM F/U: HCPCS | Performed by: INTERNAL MEDICINE

## 2023-02-22 RX ORDER — DIAPER,BRIEF,ADULT, DISPOSABLE
EACH MISCELLANEOUS
COMMUNITY

## 2023-02-22 NOTE — LETTER
2/22/2023    Patient: Richy Piper   YOB: 1950   Date of Visit: 2/22/2023     Jose Manuel Call, [address] [address]  [place]    Dear Jose Manuel Call, DO,      Thank you for referring Ms. Farooq Krishnamurthy to [facility] for evaluation. My notes for this consultation are attached. If you have questions, please do not hesitate to call me. I look forward to following your patient along with you.       Sincerely,    Brittany Irving MD

## 2023-02-22 NOTE — PROGRESS NOTES
Chief Complaint   Patient presents with    Thyroid Problem     * Since Last Visit: 10/19/2022     on Tirosint 75mcg and armour - 30mg BID   Levels good a few months ago (had been issues getting meds and right doses of meds)     Back went out and bad cold  Still so much fatigue and lightheaded/drunk like - off and on since covid     General:  From initial visit 8/16/22  Dx hypothyroidism since '90s  On current regimen   Over the years high variability - does great on tirosint   But GYN took off Tirosint and put on generic  - feels horrible on generic   Has been on naturthroid (suddenly felt bad on it) and switched to armour > 6 mo ago      On armour 30gm BID (~100mcg T4 equivalent) + 75mcg generic T4 == total 175mcg   Wt based is 182mcg     Thyroid Symptoms  **has decreased energy**, denies change in weight, denies change in appetite, denies sleep issues, denies hair changes, no skin changes, denies sweats, denies hot/cold intolerance, denies tremor, denies palpitations, denies change in bowels, no change in menses, denies mood changes      Neck Symptoms  denies dysphagia, denies anterior neck pain, denies neck swelling, notes no nodules      Labs/Studies    6/8/15 Thyroid US: The thyroid texture is heterogeneous. No definable nodule is identified,  however. The right lobe measures 4.2 x 1.7 x 1.6 cm and the left lobe measures 3.9 x 1.2 x 0.9 cm. The isthmus measures 3 mm.     2/14/22 FT4 1.2, TSH 1.7, FT3 3.2    6/28/22 FSH 20, est 19.9, prog 0.1, TSH 2.0, FT4 1.0      Lab Results   Component Value Date/Time    TSH 1.12 12/21/2022 12:22 PM    T3, total 72 10/13/2022 11:17 AM    T3 Uptake 24 10/13/2022 11:17 AM    T4, Free 1.0 03/17/2022 11:45 AM    T4, Total 5.3 10/13/2022 11:17 AM    Thyroid peroxidase Ab 11 05/28/2015 12:13 PM        Lab Results   Component Value Date/Time    TSH 1.12 12/21/2022 12:22 PM    TSH 13.200 (H) 10/13/2022 11:17 AM    TSH 1.45 03/17/2022 11:45 AM    TSH 8.75 (H) 12/23/2021 01:35 PM TSH 3.450 06/05/2020 11:11 AM    TSH 9.860 (H) 09/15/2015 12:00 AM    TSH 9.520 (H) 09/15/2015 12:00 AM    TSH 2.780 05/28/2015 12:13 PM    TSH 1.980 04/11/2012 12:44 PM          Past Medical History:   Diagnosis Date    BPPV (benign paroxysmal positional vertigo)     Chronic fatigue syndrome 3/16/2012    Fibromyalgia     BRAVO on CPAP     BiPAP    Other dyspnea and respiratory abnormality     Palpitations     Prediabetes     Skin cancer 3/16/2012    Unspecified hypothyroidism     goiter    Vitamin D deficiency       Blood pressure (!) 125/57, pulse 67, height 5' 5\" (1.651 m), weight 243 lb (110.2 kg). Weight Metrics 2/22/2023 12/21/2022 10/19/2022 9/20/2022 8/16/2022 8/3/2022 6/10/2022   Weight 243 lb 247 lb 250 lb 251 lb 3.2 oz 248 lb 250 lb 251 lb   BMI 40.44 kg/m2 41.1 kg/m2 41.6 kg/m2 41.17 kg/m2 40.64 kg/m2 40.97 kg/m2 41.13 kg/m2        EXAM:  - not done today       Assessment/Plan:   1. Acquired hypothyroidism  Back on meds and levels were good  Recheck now - has Sx but may be post-covid related    2. Fatigue  If thyroid level still normal, then not from thyroid  Check with PCP - may be post-covid syndrome    Orders Placed This Encounter    THYROID PANEL W/TSH    T3 TOTAL    TSH 3RD GENERATION     Standing Status:   Future     Standing Expiration Date:   2/22/2024              Follow-up and Dispositions    Return in about 6 months (around 8/22/2023).

## 2023-02-23 DIAGNOSIS — R74.8 ELEVATED LIVER ENZYMES: Primary | ICD-10-CM

## 2023-02-23 LAB
FT4I SERPL CALC-MCNC: 2.5 (ref 1.2–4.9)
T3 SERPL-MCNC: 118 NG/DL (ref 71–180)
T3RU NFR SERPL: 31 % (ref 24–39)
T4 SERPL-MCNC: 8.2 UG/DL (ref 4.5–12)
TSH SERPL DL<=0.005 MIU/L-ACNC: 0.81 UIU/ML (ref 0.45–4.5)

## 2023-02-24 ENCOUNTER — TELEPHONE (OUTPATIENT)
Dept: PRIMARY CARE CLINIC | Age: 73
End: 2023-02-24

## 2023-03-09 ENCOUNTER — TELEPHONE (OUTPATIENT)
Dept: PRIMARY CARE CLINIC | Age: 73
End: 2023-03-09

## 2023-03-09 NOTE — TELEPHONE ENCOUNTER
Spoke to patient. Advised patient per Dr. Sonna Apgar to call the number on the back of her insurance card to see which specialists are covered under her insurance, then to call to see if any of those providers would have an earlier availability. Then to call us to let us know who we need to send a new referral to. Patient understood, and said she will call her insurance. Patient also stated she would like a recommendation for hormone treatments as she is \"growing whiskers\" and the current treatment she is doing/going to is not working for her. Advised patient I would see what Dr. Sonna Apgar suggests.

## 2023-03-09 NOTE — TELEPHONE ENCOUNTER
Patient said the specialist that Dr. Felicitas Parks referred her to doesn't have any available appointments until December.  Patient asked can Dr. Felicitas Parks  refer her to another specialist.

## 2023-03-10 ENCOUNTER — PATIENT MESSAGE (OUTPATIENT)
Dept: HEMATOLOGY | Age: 73
End: 2023-03-10

## 2023-03-10 NOTE — TELEPHONE ENCOUNTER
Sent patient mychart message - per Dr. Ciaran Dennis She would need to see GYN or talk to her Endo. I do not do hormone replacement therapy.

## 2023-03-23 ENCOUNTER — TELEPHONE (OUTPATIENT)
Dept: PRIMARY CARE CLINIC | Age: 73
End: 2023-03-23

## 2023-03-23 NOTE — TELEPHONE ENCOUNTER
Sent patient mychart message - Per Dr. Nicol Alicea would recommend she reach out to [facility] if she has questions about the test they did.

## 2023-03-23 NOTE — TELEPHONE ENCOUNTER
----- Message from [facility] sent at 3/22/2023  2:31 PM EDT -----  Subject: Message to Provider    QUESTIONS  Information for Provider? Please call patient regarding her appt with   Pulmonary on 3/13. Had a question regarding the breathing testing they   did. She scheduled a VV appt for 4/5 but would like a call   ---------------------------------------------------------------------------  --------------  [id number] "[employer]  [phone]; OK to leave message on voicemail  ---------------------------------------------------------------------------  --------------  SCRIPT ANSWERS  Relationship to Patient?  Self

## 2023-04-04 LAB
CREATININE, EXTERNAL: 0.66
HBA1C MFR BLD HPLC: 5.7 %

## 2023-04-26 ENCOUNTER — OFFICE VISIT (OUTPATIENT)
Dept: PRIMARY CARE CLINIC | Age: 73
End: 2023-04-26
Payer: MEDICARE

## 2023-04-26 VITALS
DIASTOLIC BLOOD PRESSURE: 78 MMHG | OXYGEN SATURATION: 96 % | HEART RATE: 60 BPM | BODY MASS INDEX: 40.1 KG/M2 | TEMPERATURE: 97.3 F | SYSTOLIC BLOOD PRESSURE: 126 MMHG | WEIGHT: 241 LBS

## 2023-04-26 DIAGNOSIS — R06.2 WHEEZING: ICD-10-CM

## 2023-04-26 DIAGNOSIS — E03.9 HYPOTHYROIDISM, UNSPECIFIED TYPE: ICD-10-CM

## 2023-04-26 DIAGNOSIS — H66.90 ACUTE OTITIS MEDIA, UNSPECIFIED OTITIS MEDIA TYPE: Primary | ICD-10-CM

## 2023-04-26 DIAGNOSIS — K76.0 HEPATIC STEATOSIS: ICD-10-CM

## 2023-04-26 DIAGNOSIS — Z71.89 COMPLEX CARE COORDINATION: ICD-10-CM

## 2023-04-26 DIAGNOSIS — M17.10 PRIMARY OSTEOARTHRITIS OF KNEE, UNSPECIFIED LATERALITY: ICD-10-CM

## 2023-04-26 DIAGNOSIS — R41.3 MEMORY CHANGE: ICD-10-CM

## 2023-04-26 DIAGNOSIS — J02.9 SORE THROAT: ICD-10-CM

## 2023-04-26 LAB
S PYO AG THROAT QL: NEGATIVE
VALID INTERNAL CONTROL?: YES

## 2023-04-26 PROCEDURE — G8417 CALC BMI ABV UP PARAM F/U: HCPCS | Performed by: FAMILY MEDICINE

## 2023-04-26 PROCEDURE — 1123F ACP DISCUSS/DSCN MKR DOCD: CPT | Performed by: FAMILY MEDICINE

## 2023-04-26 PROCEDURE — 3017F COLORECTAL CA SCREEN DOC REV: CPT | Performed by: FAMILY MEDICINE

## 2023-04-26 PROCEDURE — 87880 STREP A ASSAY W/OPTIC: CPT | Performed by: FAMILY MEDICINE

## 2023-04-26 PROCEDURE — 99213 OFFICE O/P EST LOW 20 MIN: CPT | Performed by: FAMILY MEDICINE

## 2023-04-26 PROCEDURE — G8536 NO DOC ELDER MAL SCRN: HCPCS | Performed by: FAMILY MEDICINE

## 2023-04-26 PROCEDURE — G8510 SCR DEP NEG, NO PLAN REQD: HCPCS | Performed by: FAMILY MEDICINE

## 2023-04-26 PROCEDURE — G8427 DOCREV CUR MEDS BY ELIG CLIN: HCPCS | Performed by: FAMILY MEDICINE

## 2023-04-26 PROCEDURE — 1090F PRES/ABSN URINE INCON ASSESS: CPT | Performed by: FAMILY MEDICINE

## 2023-04-26 PROCEDURE — G8399 PT W/DXA RESULTS DOCUMENT: HCPCS | Performed by: FAMILY MEDICINE

## 2023-04-26 PROCEDURE — 1101F PT FALLS ASSESS-DOCD LE1/YR: CPT | Performed by: FAMILY MEDICINE

## 2023-04-26 RX ORDER — DOXYCYCLINE 100 MG/1
100 CAPSULE ORAL 2 TIMES DAILY
Qty: 20 CAPSULE | Refills: 0 | Status: SHIPPED | OUTPATIENT
Start: 2023-04-26 | End: 2023-05-06

## 2023-04-26 NOTE — PROGRESS NOTES
HPI     Chief Complaint   Patient presents with    Follow-up     On recent appointments    Ear Pain     She is a 74 y.o. female who presents for ear pain. Ear pain started 2 weeks. L ear has been hurting. States it aches or grabs. She put a q tip in yesterday which was painful. No drainage. Has been using homeopathic oil in the ear. Has sore throat off and on and coughing in the morning. She saw her GYN doctor with [facility] in [place], [place] for her estrogen cream. States she mentioned her lower pelvic pain and they wanted to do a CT scan. States the scan showed a \"spot. \" Was told to follow up with someone in [place] for this. States she has been calling for the report and has not heard back. [phone]. She does not have any more information then this. She did see Hepatology at [facility] and had a fibroscan that showed severe fatty liver disease. She had no fibrosis on the scan. She has been referred to a diet program that she plans to do. They did order additional liver tests and told her to follow up in 2-3 months. ALT was mildly elevated at 58 but otherwise states labs were normal.    She states she had a corneal ulcer and has been seeing Optho for this since July. She has been using saline solution for this and has been doing treatments with Optho. She saw Chavo 2 months ago for hypothyroid. States she feels things are \"off balance\" with hair, skin, fatigue. Chavo had suggested she might have post COVID syndrome. She again mentions memory is not great, lots of \"brain fog. \" She was referred to Neuropsych but has not gone or schedule an appointment. She again was considering the hyperbaric oxygen but cost is a concern states it is 4000+ for a treatment. She saw a functional doctor in [place] and was started on Naltrexone and they want her to start a low dose of medical marijuana. She is still wheezing and has followed up with [facility] [place].  States she is not sure what her lung function testing showed. She has been having knee pain. States R knee feels it gets stuck easily. She saw Dr. Carlota Mckeon in September 2022. She is scared for surgical procedures and possible memory getting worse. States when she saw the concussion clinic they recommended avoiding any procedures/ GA. Review of Systems   Constitutional:  Negative for fever. HENT:  Positive for ear pain and sore throat. Negative for ear discharge. Respiratory:  Positive for cough. Musculoskeletal:  Positive for gait problem. Reviewed PmHx, RxHx, FmHx, SocHx, AllgHx and updated and dated in the chart. Physical Exam:  Visit Vitals  /78 (BP 1 Location: Left upper arm, BP Patient Position: Sitting, BP Cuff Size: Large adult)   Pulse 60   Temp 97.3 °F (36.3 °C) (Temporal)   Wt 241 lb (109.3 kg)   SpO2 96%   BMI 40.10 kg/m²     Physical Exam  Vitals reviewed. Constitutional:       General: She is not in acute distress. Appearance: Normal appearance. She is not ill-appearing. HENT:      Right Ear: Tympanic membrane normal.      Left Ear: Tympanic membrane is erythematous and bulging. Mouth/Throat:      Mouth: Mucous membranes are moist.      Pharynx: Posterior oropharyngeal erythema present. Cardiovascular:      Rate and Rhythm: Normal rate and regular rhythm. Heart sounds: No murmur heard. Pulmonary:      Effort: Pulmonary effort is normal. No respiratory distress. Breath sounds: Normal breath sounds. No wheezing, rhonchi or rales. Neurological:      General: No focal deficit present. Mental Status: She is alert.    Psychiatric:         Mood and Affect: Mood normal.         Behavior: Behavior normal.     POC Strep neg  Recent Results (from the past 12 hour(s))   AMB POC RAPID STREP A    Collection Time: 04/26/23 12:00 PM   Result Value Ref Range    VALID INTERNAL CONTROL POC Yes     Group A Strep Ag Negative Negative          Assessment / Plan     Diagnoses and all orders for this visit:    1. Acute otitis media, unspecified otitis media type - states she cannot tolerate cephalosporins or PCN but unclear what the reaction is?  -     doxycycline (VIBRAMYCIN) 100 mg capsule; Take 1 Capsule by mouth two (2) times a day for 10 days. 2. Sore throat  -     AMB POC RAPID STREP A    3. Memory change - Given referral to Neuropsychiatry again. 4. Primary osteoarthritis of knee, unspecified laterality - Advised to reach out to Dr. Kiesha Padron to see if there are any other options to manage her knee pain. 5. Hepatic steatosis - Follow up with Hepatology as scheduled. 6. Hypothyroidism, unspecified type - Follow up with Endocrinology as scheduled. 7. Wheezing - Need to get [facility]. 8. Complex care coordination - Patient is seeing multiple providers across different health systems. There is little communication despite requesting records. She seems overwhelmed managing her health problems and endorses issues with memory. She is having difficulty coordinating appointments. States  is driving her. She lives 1.5 hours away from our office. Will place referral to  to identify any needs patient might have. -     REFERRAL TO AMBULATORY CARE  MANAGEMENT       I have discussed the diagnosis with the patient and the intended plan as seen in the above orders. The patient has received an after-visit summary and questions were answered concerning future plans. I have discussed medication side effects and warnings with the patient as well.     Jose Acosta, DO

## 2023-04-26 NOTE — PROGRESS NOTES
Chief Complaint   Patient presents with    Follow-up        Identified pt with two pt identifiers(name and ). Chief Complaint   Patient presents with    Follow-up        3 most recent PHQ Screens 2022   Little interest or pleasure in doing things Nearly every day   Feeling down, depressed, irritable, or hopeless More than half the days   Total Score PHQ 2 5   Trouble falling or staying asleep, or sleeping too much Nearly every day   Feeling tired or having little energy Nearly every day   Poor appetite, weight loss, or overeating Nearly every day   Feeling bad about yourself - or that you are a failure or have let yourself or your family down More than half the days   Trouble concentrating on things such as school, work, reading, or watching TV Nearly every day   Moving or speaking so slowly that other people could have noticed; or the opposite being so fidgety that others notice Several days   Thoughts of being better off dead, or hurting yourself in some way Not at all   PHQ 9 Score 20   How difficult have these problems made it for you to do your work, take care of your home and get along with others Extremely difficult        There were no vitals filed for this visit. Health Maintenance Due   Topic Date Due    Hepatitis C Screening  Never done    COVID-19 Vaccine (1) Never done    Colorectal Cancer Screening Combo  Never done    Shingles Vaccine (1 of 2) Never done        1. Have you been to the ER, urgent care clinic since your last visit? Hospitalized since your last visit? No    2. Have you seen or consulted any other health care providers outside of the [address] [place] [address] since your last visit? Include any pap smears or colon screening. Dr. Ashley Anders,     3. For patients aged 39-70: Has the patient had a colonoscopy / FIT/ Cologuard? No      If the patient is female:    4. For patients aged 41-77: Has the patient had a mammogram within the past 2 years? No      5.  For patients aged 21-65: Has the patient had a pap smear?  Yes - no Care Gap present

## 2023-05-08 ENCOUNTER — TELEPHONE (OUTPATIENT)
Dept: PRIMARY CARE CLINIC | Facility: CLINIC | Age: 73
End: 2023-05-08

## 2023-05-15 NOTE — TELEPHONE ENCOUNTER
Sent patient FluGenhart message - Per Dr. Mejia Learn Typically the MRI would be ordered by the high . Mauc Instructions: By selecting yes to the question below the MAUC number will be added into the note.  This will be calculated automatically based on the diagnosis chosen, the size entered, the body zone selected (H,M,L) and the specific indications you chose. You will also have the option to override the Mohs AUC if you disagree with the automatically calculated number and this option is found in the Case Summary tab.

## 2023-05-16 RX ORDER — THYROID 30 MG/1
TABLET ORAL
Qty: 60 TABLET | Refills: 0 | Status: SHIPPED | OUTPATIENT
Start: 2023-05-16

## 2023-06-01 ENCOUNTER — OFFICE VISIT (OUTPATIENT)
Age: 73
End: 2023-06-01
Payer: MEDICARE

## 2023-06-01 VITALS
WEIGHT: 243 LBS | HEART RATE: 73 BPM | BODY MASS INDEX: 40.48 KG/M2 | SYSTOLIC BLOOD PRESSURE: 145 MMHG | HEIGHT: 65 IN | DIASTOLIC BLOOD PRESSURE: 74 MMHG

## 2023-06-01 DIAGNOSIS — N95.0 POSTMENOPAUSAL BLEEDING: ICD-10-CM

## 2023-06-01 DIAGNOSIS — Z01.818 PREOP TESTING: ICD-10-CM

## 2023-06-01 DIAGNOSIS — R93.89 THICKENED ENDOMETRIUM: ICD-10-CM

## 2023-06-01 DIAGNOSIS — E66.01 OBESITY, CLASS III, BMI 40-49.9 (MORBID OBESITY) (HCC): ICD-10-CM

## 2023-06-01 DIAGNOSIS — N95.0 POSTMENOPAUSAL BLEEDING: Primary | ICD-10-CM

## 2023-06-01 PROCEDURE — 1123F ACP DISCUSS/DSCN MKR DOCD: CPT | Performed by: OBSTETRICS & GYNECOLOGY

## 2023-06-01 PROCEDURE — 1090F PRES/ABSN URINE INCON ASSESS: CPT | Performed by: OBSTETRICS & GYNECOLOGY

## 2023-06-01 PROCEDURE — 99204 OFFICE O/P NEW MOD 45 MIN: CPT | Performed by: OBSTETRICS & GYNECOLOGY

## 2023-06-01 PROCEDURE — 3017F COLORECTAL CA SCREEN DOC REV: CPT | Performed by: OBSTETRICS & GYNECOLOGY

## 2023-06-01 PROCEDURE — G8417 CALC BMI ABV UP PARAM F/U: HCPCS | Performed by: OBSTETRICS & GYNECOLOGY

## 2023-06-01 PROCEDURE — 1036F TOBACCO NON-USER: CPT | Performed by: OBSTETRICS & GYNECOLOGY

## 2023-06-01 PROCEDURE — G8427 DOCREV CUR MEDS BY ELIG CLIN: HCPCS | Performed by: OBSTETRICS & GYNECOLOGY

## 2023-06-01 PROCEDURE — G8399 PT W/DXA RESULTS DOCUMENT: HCPCS | Performed by: OBSTETRICS & GYNECOLOGY

## 2023-06-01 RX ORDER — FAMOTIDINE 40 MG/1
40 TABLET, FILM COATED ORAL DAILY
COMMUNITY

## 2023-06-01 RX ORDER — FOLIC ACID 0.8 MG
TABLET ORAL
COMMUNITY

## 2023-06-01 RX ORDER — THERMOMETER, ELECTRONIC,ORAL
EACH MISCELLANEOUS 2 TIMES DAILY
COMMUNITY

## 2023-06-01 RX ORDER — OLOPATADINE HYDROCHLORIDE 7 MG/ML
SOLUTION OPHTHALMIC
COMMUNITY

## 2023-06-01 RX ORDER — M-VIT,TX,IRON,MINS/CALC/FOLIC 27MG-0.4MG
1 TABLET ORAL DAILY
COMMUNITY

## 2023-06-01 NOTE — PROGRESS NOTES
New patient referred by  for PMB.
and she wishes to proceed as planned. An electronic signature was used to sign this note.     Summer Ferguson MD  06/01/23

## 2023-06-05 ENCOUNTER — TELEPHONE (OUTPATIENT)
Age: 73
End: 2023-06-05

## 2023-06-05 NOTE — TELEPHONE ENCOUNTER
I received a voice message from MsNorma Leidy Armida saying she is having A LOT of hot flashes. She is getting some lab work done for her PCP and is wondering if Dr. Macy Makrs could order a thyroid panel as well to see if the hot flashes are related to her thyroid.   Warden Gracia

## 2023-06-06 DIAGNOSIS — E03.9 ACQUIRED HYPOTHYROIDISM: Primary | ICD-10-CM

## 2023-06-06 LAB
ALBUMIN SERPL-MCNC: 4.4 G/DL (ref 3.7–4.7)
ALBUMIN/GLOB SERPL: 1.9 {RATIO} (ref 1.2–2.2)
ALP SERPL-CCNC: 62 IU/L (ref 44–121)
ALT SERPL-CCNC: 59 IU/L (ref 0–32)
AST SERPL-CCNC: 42 IU/L (ref 0–40)
BASOPHILS # BLD AUTO: 0.1 X10E3/UL (ref 0–0.2)
BASOPHILS NFR BLD AUTO: 1 %
BILIRUB SERPL-MCNC: 0.4 MG/DL (ref 0–1.2)
BUN SERPL-MCNC: 20 MG/DL (ref 8–27)
BUN/CREAT SERPL: 24 (ref 12–28)
CALCIUM SERPL-MCNC: 9.3 MG/DL (ref 8.7–10.3)
CHLORIDE SERPL-SCNC: 104 MMOL/L (ref 96–106)
CO2 SERPL-SCNC: 25 MMOL/L (ref 20–29)
CREAT SERPL-MCNC: 0.84 MG/DL (ref 0.57–1)
EGFRCR SERPLBLD CKD-EPI 2021: 72 ML/MIN/1.73
EOSINOPHIL # BLD AUTO: 0.2 X10E3/UL (ref 0–0.4)
EOSINOPHIL NFR BLD AUTO: 4 %
ERYTHROCYTE [DISTWIDTH] IN BLOOD BY AUTOMATED COUNT: 11.8 % (ref 11.7–15.4)
GLOBULIN SER CALC-MCNC: 2.3 G/DL (ref 1.5–4.5)
GLUCOSE SERPL-MCNC: 120 MG/DL (ref 70–99)
HCT VFR BLD AUTO: 43.7 % (ref 34–46.6)
HGB BLD-MCNC: 14.5 G/DL (ref 11.1–15.9)
IMM GRANULOCYTES # BLD AUTO: 0 X10E3/UL (ref 0–0.1)
IMM GRANULOCYTES NFR BLD AUTO: 0 %
LYMPHOCYTES # BLD AUTO: 1.2 X10E3/UL (ref 0.7–3.1)
LYMPHOCYTES NFR BLD AUTO: 24 %
MCH RBC QN AUTO: 30.5 PG (ref 26.6–33)
MCHC RBC AUTO-ENTMCNC: 33.2 G/DL (ref 31.5–35.7)
MCV RBC AUTO: 92 FL (ref 79–97)
MONOCYTES # BLD AUTO: 0.4 X10E3/UL (ref 0.1–0.9)
MONOCYTES NFR BLD AUTO: 9 %
NEUTROPHILS # BLD AUTO: 3.1 X10E3/UL (ref 1.4–7)
NEUTROPHILS NFR BLD AUTO: 62 %
PLATELET # BLD AUTO: 295 X10E3/UL (ref 150–450)
POTASSIUM SERPL-SCNC: 4.5 MMOL/L (ref 3.5–5.2)
PROT SERPL-MCNC: 6.7 G/DL (ref 6–8.5)
RBC # BLD AUTO: 4.75 X10E6/UL (ref 3.77–5.28)
SODIUM SERPL-SCNC: 143 MMOL/L (ref 134–144)
WBC # BLD AUTO: 5 X10E3/UL (ref 3.4–10.8)

## 2023-06-06 NOTE — TELEPHONE ENCOUNTER
TSH ordered, but would need an office visit to review the results             I called Ms. Yamilka and relayed the above message from Dr. Margo Blizzard. She understood this information.  I then transferred her to Liz Ng to make that appointment  Galdino Herrera

## 2023-06-08 ENCOUNTER — ANESTHESIA EVENT (OUTPATIENT)
Facility: HOSPITAL | Age: 73
End: 2023-06-08
Payer: MEDICARE

## 2023-06-08 NOTE — H&P
[address] [place] [address] [address], [address] [place] [address] Zelda  P [phone]  F [phone]        Patient ID:  Name:  Saleem Mayo  MRN:  [id number]  :  1950/73 y.o. Date:  2023      HISTORY OF PRESENT ILLNESS:  Saleem Mayo is a morbidly obese 74 y.o.  postmenopausal female who is a new patient being seen for postmenopausal bleeding. She is referred by Julian Lugo with [facility].  She has been on \"bioidentical\" hormones for a few years. Her most recent pap smear was normal.  A pelvic ultrasound performed at [facility] demonstrated a normal-sized uterus, but the endometrium was obscured by a patchy inhomogeneous mixed echogenic mass measuring 2.4 x 2.9 x 3.5 cm, suspicious for an endometrial neoplasm. I have been asked to see her in consultation for further evaluation and management. ROS:   and GI review:  Negative  Cardiopulmonary review:  Negative   Musculoskeletal:  Negative    A comprehensive review of systems was negative except for that written in the History of Present Illness. , 10 point ROS      OB/GYN ROS/HX:        Problem List:  Patient Active Problem List    Diagnosis Date Noted    Obesity, morbid ([address] 2018    Cervical radiculopathy due to degenerative joint disease of spine 2017    Carpal tunnel syndrome, bilateral upper limbs 2017    Multinodular goiter 2015    Hypothyroidism (acquired) 2015    Anxiety disorder 2012    Supraventricular tachycardia, paroxysmal ([address] 2012    Iatrogenic hyperthyroidism 2012    Fibromyalgia 2012    Chest pain, unspecified 2012    Skin cancer 2012    Chronic fatigue syndrome 2012    Obesity, unspecified 2012    Impaired glucose tolerance 2012    Palpitations 2012     PMH:  Past Medical History:   Diagnosis Date    BPPV (benign paroxysmal positional vertigo)     Chronic fatigue syndrome

## 2023-06-09 ENCOUNTER — ANESTHESIA (OUTPATIENT)
Facility: HOSPITAL | Age: 73
End: 2023-06-09
Payer: MEDICARE

## 2023-06-09 ENCOUNTER — HOSPITAL ENCOUNTER (OUTPATIENT)
Facility: HOSPITAL | Age: 73
Setting detail: OUTPATIENT SURGERY
Discharge: HOME | End: 2023-06-09
Attending: OBSTETRICS & GYNECOLOGY | Admitting: OBSTETRICS & GYNECOLOGY
Payer: MEDICARE

## 2023-06-09 ENCOUNTER — TELEPHONE (OUTPATIENT)
Age: 73
End: 2023-06-09

## 2023-06-09 VITALS
TEMPERATURE: 97.4 F | OXYGEN SATURATION: 97 % | RESPIRATION RATE: 13 BRPM | WEIGHT: 245 LBS | BODY MASS INDEX: 39.37 KG/M2 | HEIGHT: 66 IN | DIASTOLIC BLOOD PRESSURE: 59 MMHG | SYSTOLIC BLOOD PRESSURE: 132 MMHG | HEART RATE: 73 BPM

## 2023-06-09 DIAGNOSIS — G89.18 POST-OP PAIN: ICD-10-CM

## 2023-06-09 DIAGNOSIS — Z01.818 PRE-OP TESTING: Primary | ICD-10-CM

## 2023-06-09 PROCEDURE — 2500000003 HC RX 250 WO HCPCS: Performed by: NURSE ANESTHETIST, CERTIFIED REGISTERED

## 2023-06-09 PROCEDURE — 3700000001 HC ADD 15 MINUTES (ANESTHESIA): Performed by: OBSTETRICS & GYNECOLOGY

## 2023-06-09 PROCEDURE — 2709999900 HC NON-CHARGEABLE SUPPLY: Performed by: OBSTETRICS & GYNECOLOGY

## 2023-06-09 PROCEDURE — 3700000000 HC ANESTHESIA ATTENDED CARE: Performed by: OBSTETRICS & GYNECOLOGY

## 2023-06-09 PROCEDURE — 2580000003 HC RX 258: Performed by: PHYSICIAN ASSISTANT

## 2023-06-09 PROCEDURE — 7100000000 HC PACU RECOVERY - FIRST 15 MIN: Performed by: OBSTETRICS & GYNECOLOGY

## 2023-06-09 PROCEDURE — 3600000004 HC SURGERY LEVEL 4 BASE: Performed by: OBSTETRICS & GYNECOLOGY

## 2023-06-09 PROCEDURE — 7100000001 HC PACU RECOVERY - ADDTL 15 MIN: Performed by: OBSTETRICS & GYNECOLOGY

## 2023-06-09 PROCEDURE — 2580000003 HC RX 258: Performed by: ANESTHESIOLOGY

## 2023-06-09 PROCEDURE — 2720000010 HC SURG SUPPLY STERILE: Performed by: OBSTETRICS & GYNECOLOGY

## 2023-06-09 PROCEDURE — 6360000002 HC RX W HCPCS: Performed by: NURSE ANESTHETIST, CERTIFIED REGISTERED

## 2023-06-09 PROCEDURE — 6360000002 HC RX W HCPCS: Performed by: PHYSICIAN ASSISTANT

## 2023-06-09 PROCEDURE — 6370000000 HC RX 637 (ALT 250 FOR IP): Performed by: ANESTHESIOLOGY

## 2023-06-09 PROCEDURE — 3600000014 HC SURGERY LEVEL 4 ADDTL 15MIN: Performed by: OBSTETRICS & GYNECOLOGY

## 2023-06-09 RX ORDER — MIDAZOLAM HYDROCHLORIDE 1 MG/ML
INJECTION INTRAMUSCULAR; INTRAVENOUS PRN
Status: DISCONTINUED | OUTPATIENT
Start: 2023-06-09 | End: 2023-06-09 | Stop reason: SDUPTHER

## 2023-06-09 RX ORDER — ONDANSETRON 2 MG/ML
4 INJECTION INTRAMUSCULAR; INTRAVENOUS
Status: DISCONTINUED | OUTPATIENT
Start: 2023-06-09 | End: 2023-06-09 | Stop reason: HOSPADM

## 2023-06-09 RX ORDER — KETAMINE HCL IN NACL, ISO-OSM 100MG/10ML
SYRINGE (ML) INJECTION PRN
Status: DISCONTINUED | OUTPATIENT
Start: 2023-06-09 | End: 2023-06-09 | Stop reason: SDUPTHER

## 2023-06-09 RX ORDER — DEXAMETHASONE SODIUM PHOSPHATE 4 MG/ML
INJECTION, SOLUTION INTRA-ARTICULAR; INTRALESIONAL; INTRAMUSCULAR; INTRAVENOUS; SOFT TISSUE PRN
Status: DISCONTINUED | OUTPATIENT
Start: 2023-06-09 | End: 2023-06-09 | Stop reason: SDUPTHER

## 2023-06-09 RX ORDER — SODIUM CHLORIDE 9 MG/ML
INJECTION, SOLUTION INTRAVENOUS PRN
Status: DISCONTINUED | OUTPATIENT
Start: 2023-06-09 | End: 2023-06-09 | Stop reason: HOSPADM

## 2023-06-09 RX ORDER — OXYCODONE HYDROCHLORIDE 5 MG/1
5 TABLET ORAL
Status: DISCONTINUED | OUTPATIENT
Start: 2023-06-09 | End: 2023-06-09 | Stop reason: HOSPADM

## 2023-06-09 RX ORDER — HYDROMORPHONE HCL 110MG/55ML
PATIENT CONTROLLED ANALGESIA SYRINGE INTRAVENOUS PRN
Status: DISCONTINUED | OUTPATIENT
Start: 2023-06-09 | End: 2023-06-09 | Stop reason: SDUPTHER

## 2023-06-09 RX ORDER — PROCHLORPERAZINE EDISYLATE 5 MG/ML
5 INJECTION INTRAMUSCULAR; INTRAVENOUS
Status: DISCONTINUED | OUTPATIENT
Start: 2023-06-09 | End: 2023-06-09 | Stop reason: HOSPADM

## 2023-06-09 RX ORDER — FENTANYL CITRATE 50 UG/ML
25 INJECTION, SOLUTION INTRAMUSCULAR; INTRAVENOUS EVERY 5 MIN PRN
Status: DISCONTINUED | OUTPATIENT
Start: 2023-06-09 | End: 2023-06-09 | Stop reason: HOSPADM

## 2023-06-09 RX ORDER — HYDRALAZINE HYDROCHLORIDE 20 MG/ML
10 INJECTION INTRAMUSCULAR; INTRAVENOUS
Status: DISCONTINUED | OUTPATIENT
Start: 2023-06-09 | End: 2023-06-09 | Stop reason: HOSPADM

## 2023-06-09 RX ORDER — ACETAMINOPHEN 500 MG
1000 TABLET ORAL ONCE
Status: COMPLETED | OUTPATIENT
Start: 2023-06-09 | End: 2023-06-09

## 2023-06-09 RX ORDER — SODIUM CHLORIDE 0.9 % (FLUSH) 0.9 %
5-40 SYRINGE (ML) INJECTION EVERY 12 HOURS SCHEDULED
Status: DISCONTINUED | OUTPATIENT
Start: 2023-06-09 | End: 2023-06-09 | Stop reason: HOSPADM

## 2023-06-09 RX ORDER — TRAMADOL HYDROCHLORIDE 50 MG/1
50 TABLET ORAL EVERY 4 HOURS PRN
Qty: 18 TABLET | Refills: 0 | Status: SHIPPED | OUTPATIENT
Start: 2023-06-09 | End: 2023-06-12

## 2023-06-09 RX ORDER — HYDROMORPHONE HYDROCHLORIDE 1 MG/ML
0.5 INJECTION, SOLUTION INTRAMUSCULAR; INTRAVENOUS; SUBCUTANEOUS EVERY 5 MIN PRN
Status: DISCONTINUED | OUTPATIENT
Start: 2023-06-09 | End: 2023-06-09 | Stop reason: HOSPADM

## 2023-06-09 RX ORDER — SODIUM CHLORIDE 0.9 % (FLUSH) 0.9 %
5-40 SYRINGE (ML) INJECTION PRN
Status: DISCONTINUED | OUTPATIENT
Start: 2023-06-09 | End: 2023-06-09 | Stop reason: HOSPADM

## 2023-06-09 RX ORDER — ONDANSETRON 2 MG/ML
INJECTION INTRAMUSCULAR; INTRAVENOUS PRN
Status: DISCONTINUED | OUTPATIENT
Start: 2023-06-09 | End: 2023-06-09 | Stop reason: SDUPTHER

## 2023-06-09 RX ORDER — PROPOFOL 10 MG/ML
INJECTION, EMULSION INTRAVENOUS PRN
Status: DISCONTINUED | OUTPATIENT
Start: 2023-06-09 | End: 2023-06-09 | Stop reason: SDUPTHER

## 2023-06-09 RX ORDER — DEXMEDETOMIDINE HYDROCHLORIDE 100 UG/ML
INJECTION, SOLUTION INTRAVENOUS PRN
Status: DISCONTINUED | OUTPATIENT
Start: 2023-06-09 | End: 2023-06-09 | Stop reason: SDUPTHER

## 2023-06-09 RX ORDER — LIDOCAINE HYDROCHLORIDE 20 MG/ML
INJECTION, SOLUTION EPIDURAL; INFILTRATION; INTRACAUDAL; PERINEURAL PRN
Status: DISCONTINUED | OUTPATIENT
Start: 2023-06-09 | End: 2023-06-09 | Stop reason: SDUPTHER

## 2023-06-09 RX ORDER — MIDAZOLAM HYDROCHLORIDE 2 MG/2ML
2 INJECTION, SOLUTION INTRAMUSCULAR; INTRAVENOUS
Status: DISCONTINUED | OUTPATIENT
Start: 2023-06-09 | End: 2023-06-09 | Stop reason: HOSPADM

## 2023-06-09 RX ORDER — SODIUM CHLORIDE, SODIUM LACTATE, POTASSIUM CHLORIDE, CALCIUM CHLORIDE 600; 310; 30; 20 MG/100ML; MG/100ML; MG/100ML; MG/100ML
INJECTION, SOLUTION INTRAVENOUS CONTINUOUS
Status: DISCONTINUED | OUTPATIENT
Start: 2023-06-09 | End: 2023-06-09 | Stop reason: HOSPADM

## 2023-06-09 RX ORDER — FENTANYL CITRATE 50 UG/ML
100 INJECTION, SOLUTION INTRAMUSCULAR; INTRAVENOUS
Status: DISCONTINUED | OUTPATIENT
Start: 2023-06-09 | End: 2023-06-09 | Stop reason: HOSPADM

## 2023-06-09 RX ORDER — DIPHENHYDRAMINE HYDROCHLORIDE 50 MG/ML
INJECTION INTRAMUSCULAR; INTRAVENOUS PRN
Status: DISCONTINUED | OUTPATIENT
Start: 2023-06-09 | End: 2023-06-09 | Stop reason: SDUPTHER

## 2023-06-09 RX ORDER — LIDOCAINE HYDROCHLORIDE 10 MG/ML
1 INJECTION, SOLUTION EPIDURAL; INFILTRATION; INTRACAUDAL; PERINEURAL
Status: DISCONTINUED | OUTPATIENT
Start: 2023-06-09 | End: 2023-06-09 | Stop reason: HOSPADM

## 2023-06-09 RX ADMIN — ACETAMINOPHEN 1000 MG: 500 TABLET ORAL at 08:52

## 2023-06-09 RX ADMIN — PROPOFOL 150 MG: 10 INJECTION, EMULSION INTRAVENOUS at 09:27

## 2023-06-09 RX ADMIN — DEXMEDETOMIDINE HYDROCHLORIDE 10 MCG: 100 INJECTION, SOLUTION, CONCENTRATE INTRAVENOUS at 09:36

## 2023-06-09 RX ADMIN — DEXAMETHASONE SODIUM PHOSPHATE 4 MG: 4 INJECTION, SOLUTION INTRAMUSCULAR; INTRAVENOUS at 09:37

## 2023-06-09 RX ADMIN — Medication 10 MG: at 09:36

## 2023-06-09 RX ADMIN — DIPHENHYDRAMINE HYDROCHLORIDE 12.5 MG: 50 INJECTION, SOLUTION INTRAMUSCULAR; INTRAVENOUS at 09:36

## 2023-06-09 RX ADMIN — HYDROMORPHONE HYDROCHLORIDE 0.4 MG: 2 INJECTION, SOLUTION INTRAMUSCULAR; INTRAVENOUS; SUBCUTANEOUS at 09:23

## 2023-06-09 RX ADMIN — LIDOCAINE HYDROCHLORIDE 25 MG: 20 INJECTION, SOLUTION EPIDURAL; INFILTRATION; INTRACAUDAL; PERINEURAL at 09:27

## 2023-06-09 RX ADMIN — MIDAZOLAM 2 MG: 1 INJECTION INTRAMUSCULAR; INTRAVENOUS at 09:15

## 2023-06-09 RX ADMIN — SODIUM CHLORIDE, POTASSIUM CHLORIDE, SODIUM LACTATE AND CALCIUM CHLORIDE: 600; 310; 30; 20 INJECTION, SOLUTION INTRAVENOUS at 08:58

## 2023-06-09 RX ADMIN — ONDANSETRON HYDROCHLORIDE 4 MG: 2 INJECTION, SOLUTION INTRAMUSCULAR; INTRAVENOUS at 09:37

## 2023-06-09 RX ADMIN — CEFOXITIN SODIUM 2000 MG: 2 POWDER, FOR SOLUTION INTRAVENOUS at 09:30

## 2023-06-09 RX ADMIN — PROPOFOL 50 MG: 10 INJECTION, EMULSION INTRAVENOUS at 09:46

## 2023-06-09 ASSESSMENT — PAIN - FUNCTIONAL ASSESSMENT: PAIN_FUNCTIONAL_ASSESSMENT: 0-10

## 2023-06-09 ASSESSMENT — ENCOUNTER SYMPTOMS: SHORTNESS OF BREATH: 1

## 2023-06-09 NOTE — ANESTHESIA POSTPROCEDURE EVALUATION
Post-Anesthesia Evaluation and Assessment    Patient: Rossy Garcia MRN: [id number]  SSN: [id number]    YOB: 1950  Age: 74 y.o. Sex: female      I have evaluated the patient and they are stable and ready for discharge from the PACU. Cardiovascular Function/Vital Signs  Visit Vitals  BP (!) 132/59   Pulse 73   Temp 97.4 °F (36.3 °C) (Oral)   Resp 13   Ht 1.664 m (5' 5.5\")   Wt 111.1 kg (245 lb)   SpO2 97%   BMI 40.15 kg/m²       Patient is status post General anesthesia for Procedure(s): HYSTEROSCOPY, DILATION AND CURETTAGE. Nausea/Vomiting: None    Postoperative hydration reviewed and adequate. Pain:  Managed    Neurological Status: At baseline    Mental Status, Level of Consciousness: Alert and  oriented to person, place, and time    Pulmonary Status:   Adequate oxygenation and airway patent    Complications related to anesthesia: None    Post-anesthesia assessment completed.  No concerns    Signed By: Candance Margo, MD     June 9, 2023

## 2023-06-09 NOTE — DISCHARGE INSTRUCTIONS
[facility] [address], [address]  P [phone]  F [phone]       Ozzy Christensen      Dear MsNorma Belen Cardoso,    Please review your instructions with your nurse and ask any questions so you have all the information you need to recover well at home. If you do not feel you have everything you need to succeed and be safe after you leave the hospital, please discuss these concerns with your nurse. As always, call for any questions at home. Take Home Medications     See Discharge Medication Review provided to you by your nurse. If you did not receive one, request this prior to your discharge. It is important that you take your medications as they are prescribed. Your prescriptions are sent to your pharmacy on record. Keep your medications in the bottles provided by the pharmacist and keep a list of the medication names, dosages, times to be taken and what they are for in your wallet. Do not take other medications without consulting your doctor. If you are prescribed enoxaparin (Lovenox) or Apixaban (Eliquis) following your surgery and are taking a baby aspirin daily, please stop taking the Aspirin until your course of enoxaparin/Eliquis is completed. You may take Tylenol and Ibuprofen or Aleve for pain. If this is not sufficient to control your pain, Tramadol or another pain medication will be prescribed for you. You should take a daily gentle stool softener such as a colace pill or dulcolax suppository for constipation as this is not uncommon after surgery and/or while on pain medication. If not prescribed, this can be found over the counter. If constipation persists for >24 hours you should take a dose of Milk of Magnesia. Call if your constipation continues. Diet    Stay hydrated and drink fluids such as gatorade and water. This will also help prevent constipation and dehydration.  Limit any usual caffeine intake such as soda,

## 2023-06-09 NOTE — TELEPHONE ENCOUNTER
Both pt and  very confused about what was done in OR today. Pt and  stated they were told she has an issue with her ovary/ovaries and that was what was to be addressed in the OR. I told her that it is the uterus, not the ovaries. Pt and  request a call back to clarify.

## 2023-06-09 NOTE — BRIEF OP NOTE
Brief Postoperative Note      Patient: Judy Chavez  YOB: 1950  MRN: [id number]    Date of Procedure: 6/9/2023    Pre-Op Diagnosis Codes:     * Postmenopausal bleeding [N95.0]    Post-Op Diagnosis: Post-Op Diagnosis Codes:     * Postmenopausal bleeding [N95.0], endometrial polyp, submucosal fibroid       Procedure(s): HYSTEROSCOPY, DILATION AND CURETTAGE    Surgeon(s):  Av Turner MD    Assistant:  * No surgical staff found *    Anesthesia: General    Estimated Blood Loss (mL): less than 50     Complications: None    Specimens:   ID Type Source Tests Collected by Time Destination   A : Endometrial currettings and polyp Tissue Uterus SURGICAL PATHOLOGY Av Turner MD 6/9/2023 0957        Implants:  * No implants in log *      Drains: * No LDAs found *    Findings: Large submucosal fibroid on the right lateral aspect of endometrial cavity. Endometrial polyp on the left anterior wall of endometrial cavity. Nothing suspicious for carcinoma.       Electronically signed by Aria Hi MD on 6/9/2023 at 10:01 AM

## 2023-06-09 NOTE — ANESTHESIA PRE PROCEDURE
results found for: PHART, PO2ART, WYR4GJA, ZHI1DIE, BEART, [id number]     Type & Screen (If Applicable):  No results found for: LABABO, LABRH    Drug/Infectious Status (If Applicable):  No results found for: HIV, HEPCAB    COVID-19 Screening (If Applicable): No results found for: COVID19        Anesthesia Evaluation  Patient summary reviewed and Nursing notes reviewed  Airway: Mallampati: II  TM distance: >3 FB   Neck ROM: full  Mouth opening: > = 3 FB   Dental: normal exam         Pulmonary: breath sounds clear to auscultation  (+) shortness of breath:  sleep apnea:                             Cardiovascular:Negative CV ROS  Exercise tolerance: good (>4 METS),           Rhythm: regular  Rate: normal                    Neuro/Psych:   (+) psychiatric history:             ROS comment: Fibromyalgia GI/Hepatic/Renal: Neg GI/Hepatic/Renal ROS  (+) morbid obesity          Endo/Other: Negative Endo/Other ROS   (+) hypothyroidism::., .                 Abdominal: normal exam            Vascular: negative vascular ROS. Other Findings:           Anesthesia Plan      general     ASA 3     (LMA okay)  Induction: intravenous. MIPS: Postoperative opioids intended and Prophylactic antiemetics administered. Anesthetic plan and risks discussed with patient.                         Lu Serrano MD   6/9/2023

## 2023-06-09 NOTE — PERIOP NOTE
1130: discharge instructions reviewed with pt and  at bedside, opportunity for questions provided.
need reinforcement.

## 2023-06-09 NOTE — OP NOTE
at the time. The single-tooth tenaculum was removed and the weighted speculum was removed. The patient was then taken out of stirrups, awakened from anesthesia, and taken to the recovery room in stable condition. All sponge, lap, needle counts were correct.        Richard Castillo MD  6/9/2023  10:03 AM

## 2023-06-20 DIAGNOSIS — C54.1 ENDOMETRIAL CARCINOMA (HCC): Primary | ICD-10-CM

## 2023-06-21 ENCOUNTER — TELEPHONE (OUTPATIENT)
Age: 73
End: 2023-06-21

## 2023-06-27 ENCOUNTER — TELEPHONE (OUTPATIENT)
Dept: PRIMARY CARE CLINIC | Facility: CLINIC | Age: 73
End: 2023-06-27

## 2023-06-27 DIAGNOSIS — R92.8 ABNORMAL MAMMOGRAM: Primary | ICD-10-CM

## 2023-07-11 LAB — TSH SERPL DL<=0.005 MIU/L-ACNC: 0.17 UIU/ML (ref 0.45–4.5)

## 2023-07-14 RX ORDER — THYROID 30 MG/1
TABLET ORAL
Qty: 60 TABLET | Refills: 0 | Status: SHIPPED | OUTPATIENT
Start: 2023-07-14

## 2023-07-17 DIAGNOSIS — R35.0 URINARY FREQUENCY: Primary | ICD-10-CM

## 2023-07-18 DIAGNOSIS — R35.0 URINARY FREQUENCY: ICD-10-CM

## 2023-07-19 LAB
APPEARANCE UR: CLEAR
BACTERIA URNS QL MICRO: NEGATIVE /HPF
BILIRUB UR QL: NEGATIVE
COLOR UR: ABNORMAL
EPITH CASTS URNS QL MICRO: ABNORMAL /LPF
GLUCOSE UR STRIP.AUTO-MCNC: NEGATIVE MG/DL
HGB UR QL STRIP: NEGATIVE
KETONES UR QL STRIP.AUTO: NEGATIVE MG/DL
LEUKOCYTE ESTERASE UR QL STRIP.AUTO: ABNORMAL
MUCOUS THREADS URNS QL MICRO: ABNORMAL /LPF
NITRITE UR QL STRIP.AUTO: NEGATIVE
PH UR STRIP: 6 (ref 5–8)
PROT UR STRIP-MCNC: NEGATIVE MG/DL
RBC #/AREA URNS HPF: ABNORMAL /HPF (ref 0–5)
SP GR UR REFRACTOMETRY: 1.02 (ref 1–1.03)
UROBILINOGEN UR QL STRIP.AUTO: 0.2 EU/DL (ref 0.2–1)
WBC URNS QL MICRO: ABNORMAL /HPF (ref 0–4)

## 2023-07-20 LAB
BACTERIA SPEC CULT: NORMAL
CC UR VC: NORMAL
SERVICE CMNT-IMP: NORMAL

## 2023-07-22 DIAGNOSIS — E03.9 ACQUIRED HYPOTHYROIDISM: ICD-10-CM

## 2023-07-30 RX ORDER — THYROID 30 MG/1
TABLET ORAL
Qty: 45 TABLET | Refills: 0 | Status: SHIPPED | OUTPATIENT
Start: 2023-07-30

## 2023-08-11 ENCOUNTER — OFFICE VISIT (OUTPATIENT)
Dept: PRIMARY CARE CLINIC | Facility: CLINIC | Age: 73
End: 2023-08-11

## 2023-08-11 VITALS
DIASTOLIC BLOOD PRESSURE: 78 MMHG | BODY MASS INDEX: 39.29 KG/M2 | SYSTOLIC BLOOD PRESSURE: 126 MMHG | OXYGEN SATURATION: 95 % | RESPIRATION RATE: 18 BRPM | WEIGHT: 235.8 LBS | HEART RATE: 68 BPM | TEMPERATURE: 97.8 F | HEIGHT: 65 IN

## 2023-08-11 DIAGNOSIS — Z90.710 S/P HYSTERECTOMY: ICD-10-CM

## 2023-08-11 DIAGNOSIS — N85.02 EIN (ENDOMETRIAL INTRAEPITHELIAL NEOPLASIA): ICD-10-CM

## 2023-08-11 DIAGNOSIS — R42 DIZZINESS: Primary | ICD-10-CM

## 2023-08-11 LAB — GLUCOSE, POC: 81 MG/DL

## 2023-08-11 RX ORDER — LANCETS 30 GAUGE
1 EACH MISCELLANEOUS DAILY
Qty: 100 EACH | Refills: 1 | Status: SHIPPED | OUTPATIENT
Start: 2023-08-11

## 2023-08-11 RX ORDER — BLOOD-GLUCOSE METER
1 KIT MISCELLANEOUS DAILY
Qty: 1 KIT | Refills: 0 | Status: SHIPPED | OUTPATIENT
Start: 2023-08-11

## 2023-08-11 RX ORDER — GLUCOSAMINE HCL/CHONDROITIN SU 500-400 MG
CAPSULE ORAL
Qty: 100 STRIP | Refills: 1 | Status: SHIPPED | OUTPATIENT
Start: 2023-08-11

## 2023-08-11 SDOH — ECONOMIC STABILITY: HOUSING INSECURITY
IN THE LAST 12 MONTHS, WAS THERE A TIME WHEN YOU DID NOT HAVE A STEADY PLACE TO SLEEP OR SLEPT IN A SHELTER (INCLUDING NOW)?: PATIENT REFUSED

## 2023-08-11 SDOH — ECONOMIC STABILITY: FOOD INSECURITY: WITHIN THE PAST 12 MONTHS, THE FOOD YOU BOUGHT JUST DIDN'T LAST AND YOU DIDN'T HAVE MONEY TO GET MORE.: PATIENT DECLINED

## 2023-08-11 SDOH — ECONOMIC STABILITY: INCOME INSECURITY: HOW HARD IS IT FOR YOU TO PAY FOR THE VERY BASICS LIKE FOOD, HOUSING, MEDICAL CARE, AND HEATING?: PATIENT DECLINED

## 2023-08-11 SDOH — ECONOMIC STABILITY: FOOD INSECURITY: WITHIN THE PAST 12 MONTHS, YOU WORRIED THAT YOUR FOOD WOULD RUN OUT BEFORE YOU GOT MONEY TO BUY MORE.: PATIENT DECLINED

## 2023-08-11 NOTE — PROGRESS NOTES
Chief Complaint   Patient presents with    Follow-up     Patient states surgery went well - still having some symptoms but she is okay  When patient eats, BP drops and becomes dizzy         There were no vitals taken for this visit. 1. Have you been to the ER, urgent care clinic since your last visit? Hospitalized since your last visit? No    2. Have you seen or consulted any other health care providers outside of the [address] [place] [address] since your last visit? Include any pap smears or colon screening. Hysterectomy - [facility]OVA      3. For patients aged 43-73: Has the patient had a colonoscopy / FIT/ Cologuard? no      If the patient is female:    4. For patients aged 43-66: Has the patient had a mammogram within the past 2 years? No      5. For patients aged 21-65: Has the patient had a pap smear?  No

## 2023-08-15 ENCOUNTER — TELEPHONE (OUTPATIENT)
Age: 73
End: 2023-08-15

## 2023-08-15 DIAGNOSIS — R92.8 ABNORMAL MAMMOGRAM: ICD-10-CM

## 2023-08-15 DIAGNOSIS — E03.9 ACQUIRED HYPOTHYROIDISM: Primary | ICD-10-CM

## 2023-08-15 RX ORDER — LEVOTHYROXINE SODIUM 75 UG/1
CAPSULE ORAL
Qty: 30 CAPSULE | Refills: 0 | Status: SHIPPED | OUTPATIENT
Start: 2023-08-15 | End: 2023-08-19 | Stop reason: SDUPTHER

## 2023-08-15 RX ORDER — THYROID 30 MG/1
TABLET ORAL
Qty: 45 TABLET | Refills: 0 | Status: SHIPPED | OUTPATIENT
Start: 2023-08-15

## 2023-08-15 NOTE — TELEPHONE ENCOUNTER
Pt called at [facility] on 8/15/2023 @8:26 am stating she needs her thyroid meds refilled. She also has questions regarding her blood levels being checked. Pt can be reached at [phone].

## 2023-08-19 DIAGNOSIS — E03.9 ACQUIRED HYPOTHYROIDISM: ICD-10-CM

## 2023-08-19 LAB
T3FREE SERPL-MCNC: 4 PG/ML (ref 2–4.4)
T4 FREE SERPL-MCNC: 1.35 NG/DL (ref 0.82–1.77)
TSH SERPL DL<=0.005 MIU/L-ACNC: 0.76 UIU/ML (ref 0.45–4.5)

## 2023-08-19 RX ORDER — LEVOTHYROXINE SODIUM 75 UG/1
CAPSULE ORAL
Qty: 30 CAPSULE | Refills: 0 | Status: SHIPPED | OUTPATIENT
Start: 2023-08-19

## 2023-08-21 RX ORDER — LEVOTHYROXINE SODIUM 75 UG/1
CAPSULE ORAL
Qty: 90 CAPSULE | Refills: 0 | Status: SHIPPED | OUTPATIENT
Start: 2023-08-21 | End: 2023-08-22

## 2023-08-21 NOTE — PROGRESS NOTES
2022 11:45 AM    TSH 8.75 2021 01:35 PM    TSH 3.450 2020 11:11 AM          Past Medical History:   Diagnosis Date    BPPV (benign paroxysmal positional vertigo)     Chronic fatigue syndrome 3/16/2012    Fibromyalgia     ALBERTO on CPAP     BiPAP    Other dyspnea and respiratory abnormality     Palpitations     PAC OCCASSIONALLY    Prediabetes     Prolonged emergence from general anesthesia     NEEDS VERY LITTLE MEDICATION TO PUT TO SLEEP    Skin cancer 3/16/2012    Unspecified hypothyroidism     goiter    Vitamin D deficiency          Blood pressure (!) 124/46, pulse 73, height 5' 5\" (1.651 m), weight 237 lb (107.5 kg). No flowsheet data found. EXAM:  - not done today     Assessment/Plan:   1. Acquired hypothyroidism  Current levels good  No changes  Will see if stays stable -repeat 2 mo (lab only)  Consider getting TSH more mid normal given age      2. Fatigue  Since TSH is normal, unlikely related to thyroid  per PCP - may be post-covid syndrome    Orders Placed This Encounter   Medications    thyroid (ARMOUR THYROID) 15 MG tablet     Si tablets in the morning, 1 tablet later in the day     Dispense:  270 tablet     Refill:  1      Orders Placed This Encounter   Procedures    TSH     LAB RADHA [id number] -3rd Generation TSH     Standing Status:   Future     Standing Expiration Date:   2024        Return in about 6 months (around 2024).

## 2023-08-22 ENCOUNTER — OFFICE VISIT (OUTPATIENT)
Age: 73
End: 2023-08-22
Payer: MEDICARE

## 2023-08-22 VITALS
WEIGHT: 237 LBS | SYSTOLIC BLOOD PRESSURE: 124 MMHG | BODY MASS INDEX: 39.49 KG/M2 | HEART RATE: 73 BPM | DIASTOLIC BLOOD PRESSURE: 46 MMHG | HEIGHT: 65 IN

## 2023-08-22 DIAGNOSIS — E03.9 ACQUIRED HYPOTHYROIDISM: Primary | ICD-10-CM

## 2023-08-22 DIAGNOSIS — R53.83 OTHER FATIGUE: ICD-10-CM

## 2023-08-22 PROCEDURE — 99214 OFFICE O/P EST MOD 30 MIN: CPT | Performed by: INTERNAL MEDICINE

## 2023-08-22 PROCEDURE — 1036F TOBACCO NON-USER: CPT | Performed by: INTERNAL MEDICINE

## 2023-08-22 PROCEDURE — 3017F COLORECTAL CA SCREEN DOC REV: CPT | Performed by: INTERNAL MEDICINE

## 2023-08-22 PROCEDURE — G8428 CUR MEDS NOT DOCUMENT: HCPCS | Performed by: INTERNAL MEDICINE

## 2023-08-22 PROCEDURE — G8417 CALC BMI ABV UP PARAM F/U: HCPCS | Performed by: INTERNAL MEDICINE

## 2023-08-22 PROCEDURE — G8399 PT W/DXA RESULTS DOCUMENT: HCPCS | Performed by: INTERNAL MEDICINE

## 2023-08-22 PROCEDURE — 1123F ACP DISCUSS/DSCN MKR DOCD: CPT | Performed by: INTERNAL MEDICINE

## 2023-08-22 PROCEDURE — 1090F PRES/ABSN URINE INCON ASSESS: CPT | Performed by: INTERNAL MEDICINE

## 2023-08-22 RX ORDER — THYROID 15 MG/1
TABLET ORAL
Qty: 270 TABLET | Refills: 1 | Status: SHIPPED | OUTPATIENT
Start: 2023-08-22

## 2023-08-22 RX ORDER — NYSTATIN 100000 U/G
OINTMENT TOPICAL
COMMUNITY
Start: 2023-07-31

## 2023-08-23 ENCOUNTER — CLINICAL DOCUMENTATION (OUTPATIENT)
Age: 73
End: 2023-08-23

## 2023-08-25 ENCOUNTER — TELEPHONE (OUTPATIENT)
Dept: PRIMARY CARE CLINIC | Facility: CLINIC | Age: 73
End: 2023-08-25

## 2023-08-25 NOTE — TELEPHONE ENCOUNTER
Patient said their blood glucose monitor kit and related supplies are too expensive at [address]. Patient asked if we can send everything to [facility] at [address].

## 2023-08-28 ENCOUNTER — TELEPHONE (OUTPATIENT)
Dept: PRIMARY CARE CLINIC | Facility: CLINIC | Age: 73
End: 2023-08-28

## 2023-08-28 DIAGNOSIS — R42 DIZZINESS: ICD-10-CM

## 2023-08-28 RX ORDER — BLOOD-GLUCOSE METER
1 KIT MISCELLANEOUS DAILY
Qty: 1 KIT | Refills: 0 | Status: SHIPPED | OUTPATIENT
Start: 2023-08-28 | End: 2023-08-31 | Stop reason: SDUPTHER

## 2023-08-28 RX ORDER — GLUCOSAMINE HCL/CHONDROITIN SU 500-400 MG
CAPSULE ORAL
Qty: 100 STRIP | Refills: 1 | Status: SHIPPED | OUTPATIENT
Start: 2023-08-28 | End: 2023-08-31 | Stop reason: SDUPTHER

## 2023-08-28 RX ORDER — LANCETS 30 GAUGE
1 EACH MISCELLANEOUS DAILY
Qty: 100 EACH | Refills: 1 | Status: SHIPPED | OUTPATIENT
Start: 2023-08-28 | End: 2023-08-31 | Stop reason: SDUPTHER

## 2023-08-28 NOTE — TELEPHONE ENCOUNTER
[address] [place] [address] Avenue calling to get new rx for the diabetes supplies the one that was sent in has the wrong diagnosis code.

## 2023-08-30 ENCOUNTER — TELEPHONE (OUTPATIENT)
Dept: PRIMARY CARE CLINIC | Facility: CLINIC | Age: 73
End: 2023-08-30

## 2023-08-30 NOTE — TELEPHONE ENCOUNTER
----- Message from Bravo Amos sent at 8/30/2023 11:56 AM EDT -----  Subject: Medication Problem    Medication: Other - Melatonin   Dosage: unknown   Ordering Provider: deyvi    Question/Problem: [place], home health nurse, was inquiring if they can   get a script for Melatonin to help pt sleep? Or what the PCP thoughts were   regarding this. Please return call advising. Pharmacy: [facility] [address] [facility],  [address] [phone]    ---------------------------------------------------------------------------  --------------  Sheri Broderick YOLANDE  [phone]; OK to leave message on voicemail  ---------------------------------------------------------------------------  --------------    SCRIPT ANSWERS  Relationship to Patient: Covered Entity  Covered Entity Type: [id number]?   Representative Name: [place]

## 2023-08-31 DIAGNOSIS — R42 DIZZINESS: ICD-10-CM

## 2023-08-31 DIAGNOSIS — R73.03 PREDIABETES: Primary | ICD-10-CM

## 2023-08-31 RX ORDER — GLUCOSAMINE HCL/CHONDROITIN SU 500-400 MG
CAPSULE ORAL
Qty: 100 STRIP | Refills: 1 | Status: SHIPPED | OUTPATIENT
Start: 2023-08-31

## 2023-08-31 RX ORDER — LANCETS 30 GAUGE
1 EACH MISCELLANEOUS DAILY
Qty: 100 EACH | Refills: 1 | Status: SHIPPED | OUTPATIENT
Start: 2023-08-31

## 2023-08-31 RX ORDER — BLOOD-GLUCOSE METER
1 KIT MISCELLANEOUS DAILY
Qty: 1 KIT | Refills: 0 | Status: SHIPPED | OUTPATIENT
Start: 2023-08-31 | End: 2023-08-31 | Stop reason: SDUPTHER

## 2023-08-31 RX ORDER — LANCETS 30 GAUGE
1 EACH MISCELLANEOUS DAILY
Qty: 100 EACH | Refills: 1 | Status: SHIPPED | OUTPATIENT
Start: 2023-08-31 | End: 2023-08-31 | Stop reason: SDUPTHER

## 2023-08-31 RX ORDER — BLOOD-GLUCOSE METER
1 KIT MISCELLANEOUS DAILY
Qty: 1 KIT | Refills: 0 | Status: SHIPPED | OUTPATIENT
Start: 2023-08-31

## 2023-08-31 RX ORDER — GLUCOSAMINE HCL/CHONDROITIN SU 500-400 MG
CAPSULE ORAL
Qty: 100 STRIP | Refills: 1 | Status: SHIPPED | OUTPATIENT
Start: 2023-08-31 | End: 2023-08-31 | Stop reason: SDUPTHER

## 2023-09-01 ENCOUNTER — TELEPHONE (OUTPATIENT)
Dept: PRIMARY CARE CLINIC | Facility: CLINIC | Age: 73
End: 2023-09-01

## 2023-09-01 DIAGNOSIS — R73.03 PREDIABETES: ICD-10-CM

## 2023-09-01 NOTE — TELEPHONE ENCOUNTER
The Pharmacist from [facility] asked for a new prescription for the patients test strips and lancets.  He said because of medicare part B the maximum number of test per day needs to be added to the prescription  [phone]

## 2023-09-06 RX ORDER — LANCETS 30 GAUGE
1 EACH MISCELLANEOUS DAILY
Qty: 90 EACH | Refills: 1 | Status: SHIPPED | OUTPATIENT
Start: 2023-09-06 | End: 2023-09-06

## 2023-09-06 RX ORDER — GLUCOSAMINE HCL/CHONDROITIN SU 500-400 MG
CAPSULE ORAL
Qty: 90 STRIP | Refills: 1 | Status: SHIPPED | OUTPATIENT
Start: 2023-09-06 | End: 2023-09-06 | Stop reason: SDUPTHER

## 2023-09-06 RX ORDER — LANCETS 30 GAUGE
1 EACH MISCELLANEOUS DAILY
Qty: 90 EACH | Refills: 1 | Status: SHIPPED | OUTPATIENT
Start: 2023-09-06 | End: 2023-09-06 | Stop reason: SDUPTHER

## 2023-09-06 RX ORDER — GLUCOSAMINE HCL/CHONDROITIN SU 500-400 MG
CAPSULE ORAL
Qty: 90 STRIP | Refills: 1 | Status: SHIPPED | OUTPATIENT
Start: 2023-09-06 | End: 2023-09-06

## 2023-09-08 ENCOUNTER — TELEPHONE (OUTPATIENT)
Dept: PRIMARY CARE CLINIC | Facility: CLINIC | Age: 73
End: 2023-09-08

## 2023-09-08 NOTE — TELEPHONE ENCOUNTER
Pharmacy Tech from [facility] said he has been going back and forth with medicare B regarding the Test Strips, Kit and Lancets. He said medicare B said they don't recognize the diagnosis code for the prescriptions. E asked if Dr. Patti [place] can change the code.

## 2023-09-18 DIAGNOSIS — E03.9 ACQUIRED HYPOTHYROIDISM: ICD-10-CM

## 2023-09-19 RX ORDER — LEVOTHYROXINE SODIUM 75 UG/1
CAPSULE ORAL
Qty: 30 CAPSULE | Refills: 0 | Status: SHIPPED | OUTPATIENT
Start: 2023-09-19

## 2023-09-19 RX ORDER — LEVOTHYROXINE SODIUM 75 UG/1
CAPSULE ORAL
Qty: 90 CAPSULE | Refills: 2 | Status: SHIPPED | OUTPATIENT
Start: 2023-09-19

## 2023-10-03 DIAGNOSIS — E03.9 ACQUIRED HYPOTHYROIDISM: ICD-10-CM

## 2023-10-13 RX ORDER — THYROID 30 MG/1
TABLET ORAL
Qty: 45 TABLET | Refills: 1 | Status: SHIPPED | OUTPATIENT
Start: 2023-10-13

## 2023-11-10 ENCOUNTER — OFFICE VISIT (OUTPATIENT)
Age: 73
End: 2023-11-10
Payer: MEDICARE

## 2023-11-10 ENCOUNTER — TELEPHONE (OUTPATIENT)
Age: 73
End: 2023-11-10

## 2023-11-10 VITALS
HEIGHT: 65 IN | BODY MASS INDEX: 40.32 KG/M2 | TEMPERATURE: 97.2 F | DIASTOLIC BLOOD PRESSURE: 80 MMHG | OXYGEN SATURATION: 98 % | HEART RATE: 85 BPM | RESPIRATION RATE: 18 BRPM | SYSTOLIC BLOOD PRESSURE: 142 MMHG | WEIGHT: 242 LBS

## 2023-11-10 DIAGNOSIS — G89.29 BILATERAL CHRONIC KNEE PAIN: Primary | ICD-10-CM

## 2023-11-10 DIAGNOSIS — M25.561 BILATERAL CHRONIC KNEE PAIN: Primary | ICD-10-CM

## 2023-11-10 DIAGNOSIS — M25.562 BILATERAL CHRONIC KNEE PAIN: Primary | ICD-10-CM

## 2023-11-10 DIAGNOSIS — M17.0 BILATERAL PRIMARY OSTEOARTHRITIS OF KNEE: ICD-10-CM

## 2023-11-10 PROCEDURE — 3017F COLORECTAL CA SCREEN DOC REV: CPT | Performed by: ORTHOPAEDIC SURGERY

## 2023-11-10 PROCEDURE — G8484 FLU IMMUNIZE NO ADMIN: HCPCS | Performed by: ORTHOPAEDIC SURGERY

## 2023-11-10 PROCEDURE — 1090F PRES/ABSN URINE INCON ASSESS: CPT | Performed by: ORTHOPAEDIC SURGERY

## 2023-11-10 PROCEDURE — 1036F TOBACCO NON-USER: CPT | Performed by: ORTHOPAEDIC SURGERY

## 2023-11-10 PROCEDURE — G8399 PT W/DXA RESULTS DOCUMENT: HCPCS | Performed by: ORTHOPAEDIC SURGERY

## 2023-11-10 PROCEDURE — 99213 OFFICE O/P EST LOW 20 MIN: CPT | Performed by: ORTHOPAEDIC SURGERY

## 2023-11-10 PROCEDURE — G8417 CALC BMI ABV UP PARAM F/U: HCPCS | Performed by: ORTHOPAEDIC SURGERY

## 2023-11-10 PROCEDURE — 1123F ACP DISCUSS/DSCN MKR DOCD: CPT | Performed by: ORTHOPAEDIC SURGERY

## 2023-11-10 PROCEDURE — G8428 CUR MEDS NOT DOCUMENT: HCPCS | Performed by: ORTHOPAEDIC SURGERY

## 2023-11-10 NOTE — TELEPHONE ENCOUNTER
Patient came back into the office after her apt and she forgot to ask the following questions:    She would like the doctor to know that she has a sensation of cold and numbness in her right leg from her knee down to her ankle located on the right side of the leg. She would like to know if wearing the right knee brace that she receive from a previous orthopedic doctor may help with this cold and numbness sensation or what she can do to help stop these sensations. She also would like to know if there is an oral or topical medication that is mild to help with the pain she is having in both knees. The patient she currently taking tylenol to help with the pain but it is not controlling the pain well enough for her to be able to start an exercise regiment.      The patient's preferred pharmacy is [facility] [address] [place] [address] [facility]

## 2023-11-10 NOTE — PROGRESS NOTES
11/10/2023    Chief Complaint: Right knee pain    Assessment: Osteoarthritis right knee    Plan: This patient and I did discuss the many options in treating knee osteoarthritis. We did discuss that we could continue to seek out nonoperative modalities, such as: NSAIDs, oral and topical analgesics, knee injections, knee braces, physical therapy, stretching, strengthening, and weight loss strategies, activity modification, ambulatory assistive devices. The patient stated their understanding with this, but would like to proceed with surgical management in the form of a total knee arthroplasty. We did discuss the risks of surgery which include but are not limited to infection, nerve or blood vessel damage, failure of fixation, failure of any possible implant, need for reoperation, postoperative pain and swelling, intra-or postoperative fracture, postoperative dislocation, leg length inequality, need for reoperation, implant failure, death, disability, organ dysfunction, wound healing issues, DVT, PE, and the need for further procedures. The patient did freely state their understanding and satisfaction with our discussion. We will proceed after medical clearances. The patient was counseled at length about the risks of susan Covid-19 during their perioperative period and any recovery window from their procedure. The patient was made aware that susan Covid-19  may worsen their prognosis for recovering from their procedure and lend to a higher morbidity and/or mortality risk. All material risks, benefits, and reasonable alternatives including postponing the procedure were discussed. The patient does  wish to proceed with the procedure at this time. HPI: This is a 74 y.o. female who complains of right knee pain. Onset was gradual.  The patient has had activity dependent pain for years.     The patient has tried activity modification, physical therapy exercises, injections have failed to provide

## 2023-11-13 ENCOUNTER — PREP FOR PROCEDURE (OUTPATIENT)
Age: 73
End: 2023-11-13

## 2023-11-13 ENCOUNTER — TELEPHONE (OUTPATIENT)
Age: 73
End: 2023-11-13

## 2023-11-13 PROBLEM — M17.11 OSTEOARTHRITIS OF RIGHT KNEE: Status: ACTIVE | Noted: 2023-11-13

## 2023-11-13 NOTE — TELEPHONE ENCOUNTER
Scheduled in office. Scheduled for 12/11/23. Advised patient that clearances from PCP and Cardio  would be required, and would need to be received no less than 2 business days before surgery. Patient advised to contact the office(s) to make pre op appts as soon as possible. Patient verbalized understanding and was encouraged to contact our office with any questions or concerns regarding upcoming surgery or required clearances.

## 2023-11-13 NOTE — TELEPHONE ENCOUNTER
----- Message from Dimitri Callejas DO sent at 11/10/2023  1:45 PM EST -----  Diagnosis: Unilateral Primary Osteoarthritis, right knee M17.11  Procedure: Right total knee arthroplasty   CPT: 62993  Operative minutes: 100  Inpatient  Location: [facility] Main OR  PAT: Yes  Class: Yes  Special Equipment: Regular table, Budny foot elam, [employer] oxinium knee, Plan for cemented TKA, foot elam for prepping  Staffing: Retractor elam  Anesthesia: General with adductor canal block  Surgical index: 1

## 2023-11-17 NOTE — PROGRESS NOTES
Faxed order for supplies today. [facility] Weekly Treatment Plan Review    Treatment plan review for the following date span: 11/14/23-11/20/23    ATTENDANCE  Patient did not have any absences during this time period.      Weekly Treatment Plan Review     Treatment Plan initiated on: 10/19/23.    Dimension1: Acute Intoxication/Withdrawal Potential -   Date of Last Use 11/15/23 - THC  Any reports of withdrawal symptoms - Yes, irritability, fatigue        Dimension 2: Biomedical Conditions & Complications -   Medical Concerns:  none reported  Vitals:   BP Readings from Last 3 Encounters:   11/13/23 (!) 97/96 (10%, Z = -1.28 /  >99 %, Z >2.33)*   11/06/23 90/68 (2%, Z = -2.05 /  60%, Z = 0.25)*   10/30/23 105/75 (34%, Z = -0.41 /  84%, Z = 0.99)*     *BP percentiles are based on the 2017 AAP Clinical Practice Guideline for girls     Pulse Readings from Last 3 Encounters:   11/13/23 65   11/06/23 58   10/30/23 67     Wt Readings from Last 3 Encounters:   11/13/23 83.9 kg (185 lb) (97%, Z= 1.86)*   11/06/23 84.4 kg (186 lb) (97%, Z= 1.88)*   10/30/23 83.9 kg (185 lb) (97%, Z= 1.87)*     * Growth percentiles are based on [facility] (Girls, 2-20 Years) data.     Temp Readings from Last 3 Encounters:   11/13/23 97.5  F (36.4  C)   11/06/23 97.6  F (36.4  C)   10/30/23 97.8  F (36.6  C)      Current Medications & Medication Changes:  No current outpatient medications on file.     Current Facility-Administered Medications   Medication    naloxone (NARCAN) nasal spray 4 mg     Facility-Administered Medications Ordered in Other Encounters   Medication    acetaminophen (TYLENOL) tablet 650 mg    calcium carbonate CHEW 500 mg    diphenhydrAMINE (BENADRYL) capsule 25 mg    ibuprofen (ADVIL/MOTRIN) tablet 200 mg     Taking meds as prescribed? No, N/A not prescribed medications  Medication side effects or concerns:  N/A, not prescribed medications  Outside medical appointments this week (list provider and reason for visit):  none reported      Dimension 3:  "Emotional/Behavioral Conditions & Complications -   Mental health diagnosis   296.32 (F33.1) Major Depressive Disorder, Recurrent Episode, Moderate   Bulimia Nervosa     Date of last SIB:  client denies history, reported thoughts of SIB 2/5 on 10/24 but reported she was using this spot on the diary card to indicate desire to harm others (thoughts of hitting brother)   Date of  last SI:  Client reports SI 6 years ago with suicide attempt via tylenol overdose, denies SI since  Date of last HI: client denies history  Behavioral Targets:  following program expectations, maintaining sobriety, engaging in group, individual, and family therapy, utilizing behavior plan for feedback on treatment engagement, utilizing coping skills, managing irritability  Current MH Assignments:  Behavioral activation, 2 behavior chain assignments    Additional Narrative:  Current Mental Health symptoms include: shame/guilt, anxiety, low mood, irritability, low self-esteem, anhedonia, difficulty sleeping.  Active interventions to stabilize mental health symptoms this week : learning DBT skill \"pros/cons\", engaging in group, individual, and family therapy, meeting with program psychiatrist, processing her timeline assignment with the group, utilizing behavior plan to target treatment interfering behaviors and encourage effective treatment engagement.  Client reports improved mood over the last week. Client was fatigued and sleeping during programming on 11/16/23 but socializing with peers on breaks. Client disclosed THC use late 11/15 night/early 11/16 morning, which likely contributed to poor sleep and fatigue. Client continues to report decreased conflict at home and using STOP to manage sibling dynamics more effectively.       Dimension 4: Treatment Acceptance / Resistance -   BAKARI Diagnosis:  304.30 (F12.20) Cannabis Use Disorder, Severe  305.1 (F17.200) Tobacco Use Disorder, Severe  305.90 (F18.10) Inhalant Use Disorder, Mild (nitrous " oxide)   Stage - 1  Commitment to tx process/Stage of change- contemplation  BAKARI assignments - behavior chain for relapse on THC  Behavior plan -  YES, Progress meeting expectations with the exception of 11/16 due to significant treatment interfering behavior throughout the day of sleeping during programming  Responsibility contract - None  Peer restrictions - None    Additional Narrative - Client struggled on 11/16 with treatment interfering behaviors, sleeping throughout programming, declining skills offered, and being alert and engaged on breaks while engaging in inappropriate conversations with treatment peers. Client initially denied use, and then disclosed THC use on 11/15 night/early 11/16 morning, reporting she did not sleep the was fatigued due to THC use. Client reports finding the cart at home, however when sharing this information about her relapse with mom, mom reported she does not believe this as client went to the [facility] with her brother, and mom is working night shift over majority of the last week. Client and mom have not been following supervision expectations even to the extent of having client remain in the home and with family when mom is not able to be home. Given lack of supervision, difficult to support mom and client in processing events leading to relapse. Client was moved to stage 2 last week and then moved to stage 1 on Friday 11/17 given recent THC use and the impact of her use on her program participation. Client received a re-focus contract on 11/20 for THC use and breaking supervision expectations. A treatment peer also reported client brought a vape to treatment on 11/20 and shared this vape with peer, which is the second time peer has reported this about client.       Dimension 5: Relapse / Continued Problem Potential -   Relapses this week - YES, List 11/15 THC  Urges to use - YES, List moderate-high  UA results -   Recent Results (from the past 168 hour(s))   Urine Drug Screen  Panel    Collection Time: 11/13/23  4:21 PM   Result Value Ref Range    Amphetamines Urine Screen Negative Screen Negative    Barbituates Urine Screen Negative Screen Negative    Benzodiazepine Urine Screen Negative Screen Negative    Cannabinoids Urine Screen Positive (A) Screen Negative    Cocaine Urine Screen Negative Screen Negative    Fentanyl Qual Urine Screen Negative Screen Negative    Opiates Urine Screen Negative Screen Negative    PCP Urine Screen Negative Screen Negative   Creatinine random urine    Collection Time: 11/13/23  4:21 PM   Result Value Ref Range    Creatinine Urine mg/dL 353.0 mg/dL   THC Confirmation Quantitative Urine    Collection Time: 11/13/23  4:21 PM   Result Value Ref Range    THC Metabolite 639 ng/mL    THC/Creatinine Ratio 181 ng/mg Creat   Urine Creatinine for Drug Screen Panel    Collection Time: 11/13/23  4:21 PM   Result Value Ref Range    Creatinine Urine for Drug Screen 353 mg/dL   Ethyl Glucuronide with reflex    Collection Time: 11/13/23  4:22 PM   Result Value Ref Range    Ethyl Glucuronide Urine Negative Cutoff 500 ng/mL   Urine Drug Screen Panel    Collection Time: 11/16/23 11:06 AM   Result Value Ref Range    Amphetamines Urine Screen Negative Screen Negative    Barbituates Urine Screen Negative Screen Negative    Benzodiazepine Urine Screen Negative Screen Negative    Cannabinoids Urine Screen Positive (A) Screen Negative    Cocaine Urine Screen Negative Screen Negative    Fentanyl Qual Urine Screen Negative Screen Negative    Opiates Urine Screen Negative Screen Negative    PCP Urine Screen Negative Screen Negative   Creatinine random urine    Collection Time: 11/16/23 11:06 AM   Result Value Ref Range    Creatinine Urine mg/dL 211.2 mg/dL   Urine Creatinine for Drug Screen Panel    Collection Time: 11/16/23 11:06 AM   Result Value Ref Range    Creatinine Urine for Drug Screen 211 mg/dL     Identified triggers - access to substances, boredom, lack of supervision, peer  substance use  Coping skills identified - STOP, pros/cons, distract, play the tape forward.  Patient is not able to utilize these skills when needed.    Additional Narrative- Client relapsed on THC on 11/15/23. Client reports finding a THC pen  in a pillowcase at home and using a lighter to smoke the remainder of what was in the pen over the course of the night. Client's UAs indicate THC use also occurred between 11/6 and 11/13, which client is currently denying. Client presents with limited insight into links between finding the pen and using a lighter to smoke the THC, and is currently working on a behavior chain to highlight areas for relapse prevention skills in the future. Mom also suspects this is not true and that client used THC in a different way, possibly by using her phone to ask someone to bring it to the [facility] when she was allowed to go on Sunday with just brother. Mom is limited in her ability to supervise at night due to work, and also continues to not follow supervision expectations even when she is able at times.     Dimension 6: Recovery Environment -   Family Involvement -   Summarize attendance at family groups and family sessions - engaged  Family supportive of program/stages?  Yes  Concerns about parental supervision:  Yes: mom is working night shifts and doubles, and also allowed client to leave home with siblings to go to the [facility] unsupervised     Community support group attendance - none  Recreational activities - watching tv, listening to music  Peer Relationships - 2 approved friends  Program school involvement - [employer]    Additional Narrative - As noted above, client reports finding a cart in a pillowcase at home and has been unsupervised beyond what is necessary given mom's work schedule, with mom giving client permission to go to the [facility] with her brother unsupervised on 11/12/23. Relapse prevention planning is difficult due to inconsistent reports between mom and  client about access to substance use and supervision in general at home. Client reports decreased conflict with siblings at home. Client has also had her phone and historically has struggled to have appropriate boundaries with peers via social media and electronics, which mom struggles to supervise. Mom and client received a shared re-focus contract targeting treatment interfering behavior of not following supervision expectations.     Progress made on transition planning goals: Client was moved back to stage 1 due to recent relapse and poor treatment engagement on 11/16 as a result of relapse    Justification for Continued Treatment at this Level of Care:  Client continues to meet criteria for IOP level of care. Client presents with limited coping skills to manage her mental health and substance use symptoms. Client presents with some insight into the impact of her substance use on her overall functioning, however struggles to utilize skills effectively in order to prevent relapse. Client has some insight into the relationship between her mental health and substance use. Client recently relapsed on THC and will benefit from engaging in IOP in order to process recent relapse and engage in relapse prevention planning moving forward. Client will benefit from continuing to engage in a structured environment that is supportive of her recovery. Client will also benefit from additional accountability provided by program-administered UAs.   Treatment coordination activities this week:  coordination with family for treatment planning, , coordination with , and coordination for   Need for peer recovery support referral? No    Discharge Planning:  Target Discharge Date/Timeframe:  1/9/24   Med Mgmt Provider/Appt:  ALEXANDRA, ,will refer if medications are indicated by provider   Ind therapy Provider/Appt:  ALEXANDRA   Family therapy Provider/Appt:  ALEXANDRA   Phase II plan:  outpatient referrals, community support   School  enrollment:  plan to recommend [employer]   Other referrals:  case management through [facility], [place] (Letty Tan)        Dimension Scale Review     Prior ratings: Dim1 - 0 DIM2 - 0 DIM3 - 2 DIM4 - 2 DIM5 - 3 DIM6 -4     Current ratings: Dim1 - 0 DIM2 - 0 DIM3 - 2 DIM4 - 3 DIM5 - 3 DIM6 -4       If client is 18 or older, has vulnerable adult status change? N/A    Are Treatment Plan goals/objectives effective? No,  *If no, list changes to treatment plan: 2 re-focus contracts    Are the current goals meeting client's needs? No,  *If no, list the changes to treatment plan. 2 re-focus contracts    Service Type:  Individual Therapy Session      Session Start Time: 11:15 AM  Session End Time: 11:31 AM     Session Length: 16 minutes    Attendees:  Patient    Service Modality:  In-person     Interactive Complexity: Yes, visit entailed Interactive Complexity evidenced by:  -The need to manage maladaptive communication (related to, e.g., high anxiety, high reactivity, repeated questions, or disagreement) among participants that complicates delivery of care    Data: Met with client to complete weekly treatment plan review. Reviewed re-focus contract. Client denied using the week of 11/6-11/23. Discussed concerns about team's ability to process relapse and engage in relapse-prevention planning due to client and mom not following supervision expectations in addition to inconsistent reports between client and mom re: sibling substance use.     Interventions:  facilitated session, asked clarifying questions, reflective listening, and utilized motivation interviewing skills of eliciting and focusing on change talk    Assessment:  Client continues to adamantly deny using THC the week of 11/6-11/13 despite acknowledging relapse on 11/15. Client and mom's continued deviation from supervision expectations complicates client's ability to benefit from treatment, as treatment recommendations are not being adhered to at home consistently.  Client verbally complies with treatment expectations but lacks behavioral follow-through.     Client response:  Client was engaged and attentive    Plan:  Continue per Master Treatment Plan, Patient will complete re-focus contracts      *Client agrees with any changes to the treatment plan: Yes  *Client received copy of changes: No  *Client is aware of right to access a treatment plan review: Yes

## 2023-11-28 ENCOUNTER — TELEPHONE (OUTPATIENT)
Age: 73
End: 2023-11-28

## 2023-11-28 NOTE — TELEPHONE ENCOUNTER
Patient is requesting to reschedule her surgery from 12/11/23 to a later date due to her  becoming ill. She would like a return phone call at [phone] to reschedule the surgery.

## 2023-12-04 ENCOUNTER — TELEPHONE (OUTPATIENT)
Dept: PRIMARY CARE CLINIC | Facility: CLINIC | Age: 73
End: 2023-12-04

## 2023-12-04 ENCOUNTER — NURSE TRIAGE (OUTPATIENT)
Dept: OTHER | Facility: CLINIC | Age: 73
End: 2023-12-04

## 2023-12-04 NOTE — TELEPHONE ENCOUNTER
Location of patient: [address] [facility] call from [place] at [facility] with [employer]. Subjective: Caller states \"joint pain, numbness\"     Current Symptoms: numbness in fingers- began about 6 weeks    Right shoulder pain- unable to lift things due pain. Neck pain    Numbness outside of left leg- began about 6 mos    Nocturia-every 2 hours. Vertigo, nausea, lightheadedness. Onset: 6 months ago; worsening    Associated Symptoms: reduced activity, increased wakefulness    Pain Severity: 8-10/10; numbness, pins and needles; constant    Temperature: none     What has been tried: CBD oil and cream, baby aspirin, tylenol, ice pack    LMP: NA Pregnant: No    Recommended disposition: See in Office Today    Care advice provided, patient verbalizes understanding; denies any other questions or concerns; instructed to call back for any new or worsening symptoms. Patient/Caller agrees with recommended disposition; writer provided warm transfer to [employer] at [facility] for appointment scheduling    Attention Provider: Thank you for allowing me to participate in the care of your patient. The patient was connected to triage in response to information provided to the ECC/PSC. Please do not respond through this encounter as the response is not directed to a shared pool.         Reason for Disposition   Neck pain (with neurologic deficit)    Protocols used: Neurologic Deficit-ADULT-[place]

## 2023-12-04 NOTE — TELEPHONE ENCOUNTER
Patient was transferred over as urgent needing to be seen today. Patient said she has numbness in her fingers and left leg, pain in her right shoulder and neck. Patient said she would like an referral to someone for arthritis pain. Patient asked for a call back. Patient said she would like to be seen sooner than the 11th.

## 2023-12-05 NOTE — TELEPHONE ENCOUNTER
Spoke to patient let her know if she needs to be seen before 12/11/23,that she could Ortho on call.Patient states she would like a referral for Arthritis and Lymes Disease.Advised she may not get referrals until appointment.

## 2023-12-09 LAB — HBA1C MFR BLD HPLC: 6.2 %

## 2023-12-11 ENCOUNTER — OFFICE VISIT (OUTPATIENT)
Dept: PRIMARY CARE CLINIC | Facility: CLINIC | Age: 73
End: 2023-12-11
Payer: MEDICARE

## 2023-12-11 VITALS
DIASTOLIC BLOOD PRESSURE: 72 MMHG | WEIGHT: 233 LBS | OXYGEN SATURATION: 95 % | RESPIRATION RATE: 19 BRPM | HEART RATE: 80 BPM | HEIGHT: 65 IN | SYSTOLIC BLOOD PRESSURE: 145 MMHG | BODY MASS INDEX: 38.82 KG/M2

## 2023-12-11 DIAGNOSIS — R03.0 ELEVATED BP WITHOUT DIAGNOSIS OF HYPERTENSION: ICD-10-CM

## 2023-12-11 DIAGNOSIS — E03.9 HYPOTHYROIDISM, UNSPECIFIED TYPE: ICD-10-CM

## 2023-12-11 DIAGNOSIS — E03.9 HYPOTHYROIDISM, UNSPECIFIED TYPE: Primary | ICD-10-CM

## 2023-12-11 DIAGNOSIS — R79.89 ELEVATED FERRITIN: ICD-10-CM

## 2023-12-11 DIAGNOSIS — Z86.19 HISTORY OF LYME DISEASE: ICD-10-CM

## 2023-12-11 DIAGNOSIS — M79.7 FIBROMYALGIA: ICD-10-CM

## 2023-12-11 PROCEDURE — 1090F PRES/ABSN URINE INCON ASSESS: CPT | Performed by: FAMILY MEDICINE

## 2023-12-11 PROCEDURE — G8399 PT W/DXA RESULTS DOCUMENT: HCPCS | Performed by: FAMILY MEDICINE

## 2023-12-11 PROCEDURE — G8427 DOCREV CUR MEDS BY ELIG CLIN: HCPCS | Performed by: FAMILY MEDICINE

## 2023-12-11 PROCEDURE — 3017F COLORECTAL CA SCREEN DOC REV: CPT | Performed by: FAMILY MEDICINE

## 2023-12-11 PROCEDURE — 99215 OFFICE O/P EST HI 40 MIN: CPT | Performed by: FAMILY MEDICINE

## 2023-12-11 PROCEDURE — 1123F ACP DISCUSS/DSCN MKR DOCD: CPT | Performed by: FAMILY MEDICINE

## 2023-12-11 PROCEDURE — G8417 CALC BMI ABV UP PARAM F/U: HCPCS | Performed by: FAMILY MEDICINE

## 2023-12-11 PROCEDURE — 1036F TOBACCO NON-USER: CPT | Performed by: FAMILY MEDICINE

## 2023-12-11 PROCEDURE — G8484 FLU IMMUNIZE NO ADMIN: HCPCS | Performed by: FAMILY MEDICINE

## 2023-12-12 ENCOUNTER — TELEPHONE (OUTPATIENT)
Dept: PRIMARY CARE CLINIC | Facility: CLINIC | Age: 73
End: 2023-12-12

## 2023-12-12 LAB
FERRITIN SERPL-MCNC: 145 NG/ML (ref 26–388)
IRON SATN MFR SERPL: 18 % (ref 20–50)
IRON SERPL-MCNC: 62 UG/DL (ref 35–150)
TIBC SERPL-MCNC: 341 UG/DL (ref 250–450)
TSH SERPL DL<=0.05 MIU/L-ACNC: 4.78 UIU/ML (ref 0.36–3.74)

## 2023-12-12 NOTE — TELEPHONE ENCOUNTER
----- Message from Kenneth Patel sent at 12/12/2023  8:40 AM EST -----  Subject: Referral Request    Reason for referral request? patient needs referral for Lyme disease faxed   to provider Bryson Carrion at [phone], sees provider Gwendolyn Underwood. patient would   also like a call when referral has been faxed  Provider patient wants to be referred to(if known):     Provider Phone Number(if known):     Additional Information for Provider?   ---------------------------------------------------------------------------  --------------  [address]    [phone]; OK to leave message on voicemail  ---------------------------------------------------------------------------  --------------

## 2023-12-19 ENCOUNTER — TELEPHONE (OUTPATIENT)
Age: 73
End: 2023-12-19

## 2023-12-19 NOTE — TELEPHONE ENCOUNTER
Patient called to ask if GI is needed to address her diarrhea.              12/19/23@7838 Spoke w/patient concerning GI consult. At this time  will need to complete office note and he will decide if patient will need a GI referral. Advise patient to try anti-diarrhea use as directed on the bottle and discuss BRAT diet. I did explain all referrals for speciality offices are backed logged so it may take a few weeks/month to get appt in office. Patient verbalize understanding.(KF)           1/3/24@1450: Patient doing better now taking Imodium already.  Patient will talk with PCP for acid reflex.(KF)    Diarrhea  Received: 1 week ago  Dave Cesar MD [place], Sherri CABALLERO, RN  Caller: Unspecified (2 weeks ago)  Have her try Imodium OTC as a start.    MLS

## 2023-12-28 ENCOUNTER — TELEPHONE (OUTPATIENT)
Dept: PRIMARY CARE CLINIC | Facility: CLINIC | Age: 73
End: 2023-12-28

## 2024-01-01 RX ORDER — THYROID 30 MG/1
TABLET ORAL
Qty: 45 TABLET | Refills: 0 | Status: SHIPPED | OUTPATIENT
Start: 2024-01-01

## 2024-01-19 ENCOUNTER — TELEPHONE (OUTPATIENT)
Dept: PRIMARY CARE CLINIC | Facility: CLINIC | Age: 74
End: 2024-01-19

## 2024-01-19 NOTE — TELEPHONE ENCOUNTER
Patient transferred to the office from [facility].   Patient stated that she has been having abdominal issues since Dec and it had subsided but is starting up again.   Scheduled her an appointment for 1/22 at     She also stated that the endocrinologist she was referred to could not see her until Sept. And she can not wait that long. I explained that with her insurance she should be able to see anyone. I suggested that she call around to see who could get her in sooner and then she can give us a call and the referral (if needed) can be done to that doctor.   I also told her I would check to see if there is anything I can facilitate on my end.      I reached out to [facility] office and I was asked to fax over labs, visit notes and demographics and they will take a look at it and reach out to the patient.   Requested information has been faxed

## 2024-01-22 DIAGNOSIS — E03.9 ACQUIRED HYPOTHYROIDISM: ICD-10-CM

## 2024-02-08 ENCOUNTER — TELEMEDICINE (OUTPATIENT)
Dept: PRIMARY CARE CLINIC | Facility: CLINIC | Age: 74
End: 2024-02-08
Payer: MEDICARE

## 2024-02-08 DIAGNOSIS — R61 CHRONIC NIGHT SWEATS: ICD-10-CM

## 2024-02-08 DIAGNOSIS — R73.03 PREDIABETES: Primary | ICD-10-CM

## 2024-02-08 DIAGNOSIS — C54.1 ENDOMETRIAL CANCER (HCC): ICD-10-CM

## 2024-02-08 PROCEDURE — 1090F PRES/ABSN URINE INCON ASSESS: CPT | Performed by: FAMILY MEDICINE

## 2024-02-08 PROCEDURE — G8399 PT W/DXA RESULTS DOCUMENT: HCPCS | Performed by: FAMILY MEDICINE

## 2024-02-08 PROCEDURE — 99214 OFFICE O/P EST MOD 30 MIN: CPT | Performed by: FAMILY MEDICINE

## 2024-02-08 PROCEDURE — 3017F COLORECTAL CA SCREEN DOC REV: CPT | Performed by: FAMILY MEDICINE

## 2024-02-08 PROCEDURE — G8427 DOCREV CUR MEDS BY ELIG CLIN: HCPCS | Performed by: FAMILY MEDICINE

## 2024-02-08 PROCEDURE — 1123F ACP DISCUSS/DSCN MKR DOCD: CPT | Performed by: FAMILY MEDICINE

## 2024-02-08 NOTE — PROGRESS NOTES
Ana Prather (:  1950) is a Established patient, here for evaluation of the following:  Blood Sugar Problem (134 this morning ), Follow-up, and Sweats (Night sweats)      Assessment & Plan   Below is the assessment and plan developed based on review of pertinent history, physical exam, labs, studies, and medications.  1. Prediabetes - Worsening. Discussed Metformin but she declines starting medication. Wants to see RD and work on dietary changes instead.   -     [facility] OP Nutrition Services, [facility]  2. Endometrial cancer (HCC)  3. Chronic night sweats    Personally reviewed multiple labs in her chart including thyroid levels, CBC, A1c, CMP in 2023. Advised to follow up with Nutrition. Unfortunately we were cut short before discussing the sweats further but this is a chronic issue. She has gained weight over the past few months. Not interested in Metformin. Think this could be hormonal related but unfortunately given her hx of endometrial cancer not a good candidate for HRT. Recommend she speak with Endo to see their thoughts.     11:17 AM - sent another link since we had been disconnected earlier but she has not reconnected. Will have to reschedule with another provider if she has further concerns.        Subjective   Sweats  Associated symptoms include diaphoresis.       States blood sugar was elevated at 134 this morning. She had not been checking her blood sugar. Her last A1c 23 was 6.2 up from 5.7 in 2023. She is not on medication for her prediabetes. She has not tried Metformin and does not want to try this. She has not been eating well since . States she has been trying to reduce her carbs this month. She is interested in seeing Nutrition.     She endorses sweats x2 years. She did have a high TSH level in Dec. Advised her to reach out to her Endocrinologist about her thyroid levels. Last CBC in 2023 was normal. She is post menopausal on

## 2024-02-08 NOTE — PROGRESS NOTES
\"Have you been to the ER, urgent care clinic since your last visit?  Hospitalized since your last visit?\"    NO    “Have you seen or consulted any other health care providers outside of [facility] since your last visit?”    NO

## 2024-02-09 LAB — TSH SERPL DL<=0.005 MIU/L-ACNC: 1.29 UIU/ML (ref 0.45–4.5)

## 2024-04-02 ENCOUNTER — TELEPHONE (OUTPATIENT)
Dept: PRIMARY CARE CLINIC | Facility: CLINIC | Age: 74
End: 2024-04-02

## 2024-04-02 NOTE — TELEPHONE ENCOUNTER
Pt ([phone]) is requesting a Pre-Op appointment. I offered her 4/16/24 at 11:00 am with Dr. Hough, but she has another appointment that day and can't change it. I also offered her 4/3/24 at 2:45 pm with Dr. Hanson, but she will be in [place] and can't make that appointment.     Please assist with this matter.    LIANET - PSR

## 2024-04-22 ENCOUNTER — OFFICE VISIT (OUTPATIENT)
Dept: PRIMARY CARE CLINIC | Facility: CLINIC | Age: 74
End: 2024-04-22
Payer: MEDICARE

## 2024-04-22 VITALS
HEIGHT: 65 IN | OXYGEN SATURATION: 97 % | TEMPERATURE: 98.1 F | RESPIRATION RATE: 14 BRPM | WEIGHT: 244 LBS | HEART RATE: 79 BPM | BODY MASS INDEX: 40.65 KG/M2 | DIASTOLIC BLOOD PRESSURE: 76 MMHG | SYSTOLIC BLOOD PRESSURE: 140 MMHG

## 2024-04-22 DIAGNOSIS — R35.0 FREQUENT URINATION: ICD-10-CM

## 2024-04-22 DIAGNOSIS — E03.9 HYPOTHYROIDISM, UNSPECIFIED TYPE: ICD-10-CM

## 2024-04-22 DIAGNOSIS — M25.561 CHRONIC PAIN OF RIGHT KNEE: ICD-10-CM

## 2024-04-22 DIAGNOSIS — Z01.818 PRE-OP EVALUATION: ICD-10-CM

## 2024-04-22 DIAGNOSIS — G89.29 CHRONIC PAIN OF RIGHT KNEE: ICD-10-CM

## 2024-04-22 DIAGNOSIS — R73.03 PREDIABETES: ICD-10-CM

## 2024-04-22 DIAGNOSIS — E66.01 OBESITY, CLASS III, BMI 40-49.9 (MORBID OBESITY) (HCC): Primary | ICD-10-CM

## 2024-04-22 PROCEDURE — 1036F TOBACCO NON-USER: CPT | Performed by: INTERNAL MEDICINE

## 2024-04-22 PROCEDURE — 1090F PRES/ABSN URINE INCON ASSESS: CPT | Performed by: INTERNAL MEDICINE

## 2024-04-22 PROCEDURE — G8417 CALC BMI ABV UP PARAM F/U: HCPCS | Performed by: INTERNAL MEDICINE

## 2024-04-22 PROCEDURE — G8427 DOCREV CUR MEDS BY ELIG CLIN: HCPCS | Performed by: INTERNAL MEDICINE

## 2024-04-22 PROCEDURE — G8399 PT W/DXA RESULTS DOCUMENT: HCPCS | Performed by: INTERNAL MEDICINE

## 2024-04-22 PROCEDURE — 1123F ACP DISCUSS/DSCN MKR DOCD: CPT | Performed by: INTERNAL MEDICINE

## 2024-04-22 PROCEDURE — 99214 OFFICE O/P EST MOD 30 MIN: CPT | Performed by: INTERNAL MEDICINE

## 2024-04-22 RX ORDER — THYROID 60 MG/1
60 TABLET ORAL DAILY
COMMUNITY

## 2024-04-22 SDOH — ECONOMIC STABILITY: FOOD INSECURITY: WITHIN THE PAST 12 MONTHS, THE FOOD YOU BOUGHT JUST DIDN'T LAST AND YOU DIDN'T HAVE MONEY TO GET MORE.: NEVER TRUE

## 2024-04-22 SDOH — ECONOMIC STABILITY: FOOD INSECURITY: WITHIN THE PAST 12 MONTHS, YOU WORRIED THAT YOUR FOOD WOULD RUN OUT BEFORE YOU GOT MONEY TO BUY MORE.: NEVER TRUE

## 2024-04-22 SDOH — ECONOMIC STABILITY: HOUSING INSECURITY
IN THE LAST 12 MONTHS, WAS THERE A TIME WHEN YOU DID NOT HAVE A STEADY PLACE TO SLEEP OR SLEPT IN A SHELTER (INCLUDING NOW)?: NO

## 2024-04-22 SDOH — ECONOMIC STABILITY: INCOME INSECURITY: HOW HARD IS IT FOR YOU TO PAY FOR THE VERY BASICS LIKE FOOD, HOUSING, MEDICAL CARE, AND HEATING?: NOT HARD AT ALL

## 2024-04-22 ASSESSMENT — PATIENT HEALTH QUESTIONNAIRE - PHQ9
SUM OF ALL RESPONSES TO PHQ9 QUESTIONS 1 & 2: 0
SUM OF ALL RESPONSES TO PHQ QUESTIONS 1-9: 0
SUM OF ALL RESPONSES TO PHQ QUESTIONS 1-9: 0
2. FEELING DOWN, DEPRESSED OR HOPELESS: NOT AT ALL
1. LITTLE INTEREST OR PLEASURE IN DOING THINGS: NOT AT ALL
SUM OF ALL RESPONSES TO PHQ QUESTIONS 1-9: 0
SUM OF ALL RESPONSES TO PHQ QUESTIONS 1-9: 0

## 2024-04-22 NOTE — PROGRESS NOTES
Ana Prather is a 74 y.o.  female and presents with     Chief Complaint   Patient presents with    Pre-op Exam     Pre-Op Exam, clearance.  Lightheadedness, hot flashes, concerned about thyroid, heartburn, she's tired a lot.  She just got over stomach flu.  Her surgery is scheduled for 5/1/2024,      Pt sees Dr Thompson .  Is here for pre pre op clearance for rt knee surgery. Is on May 1st  Has appt with the surgeon and cardiology on wendesday  Surgery is scheduled in [place]  Cxr done was normal  CT scan showed DJD knee and hip.   Also divercticulosis  Pt says blood pressure goes up becaseu of the tourniquet  It is normal when is checked manually  NO chest pain, Sob, orythopena, PND. Used to see Dr Collado but changing Endocrinologist.  Has h/o PVCs, has cardiology appt for cardiac clearance.  However she does not have any history of coronary artery disease  Patient is currently on [place] Thyroid 60 mg and levothyroxine 75 mcg daily as administered by endocrinology.  Patient complains of hot flashes  Denies bleeding from any site          Past Medical History:   Diagnosis Date    ADHD (attention deficit hyperactivity disorder)     Anxiety     BPPV (benign paroxysmal positional vertigo)     Chronic back pain     Chronic fatigue syndrome 3/16/2012    Fibromyalgia     GERD (gastroesophageal reflux disease) 2015    Hearing loss     Hyperthyroidism     Liver disease     Obesity     ALBERTO on CPAP     BiPAP    Osteoarthritis     Other dyspnea and respiratory abnormality     Palpitations     PAC OCCASSIONALLY    Prediabetes     Prolonged emergence from general anesthesia     NEEDS VERY LITTLE MEDICATION TO PUT TO SLEEP    Restless legs syndrome     Skin cancer 3/16/2012    Unspecified hypothyroidism     goiter    Urinary incontinence 2013    Vitamin D deficiency      Past Surgical History:   Procedure Laterality Date    BREAST LUMPECTOMY  7/2013    right breast    CATARACT REMOVAL Bilateral     EYE SURGERY  2020

## 2024-04-22 NOTE — PROGRESS NOTES
BP (!) 161/76 (Site: Left Upper Arm, Position: Sitting, Cuff Size: Large Adult)   Pulse 79   Resp 14   Ht 1.651 m (5' 5\")   Wt 110.7 kg (244 lb)   SpO2 97%   BMI 40.60 kg/m²      \"Have you been to the ER, urgent care clinic since your last visit?  Hospitalized since your last visit?\"        “Have you seen or consulted any other health care providers outside of [facility] since your last visit?”    YES - When: approximately 5 months ago.  Where and Why: [facility]  in [place].            Click Here for Release of Records Request

## 2024-04-23 LAB
ALBUMIN SERPL-MCNC: 3.7 G/DL (ref 3.5–5)
ALBUMIN/GLOB SERPL: 1.1 (ref 1.1–2.2)
ALP SERPL-CCNC: 67 U/L (ref 45–117)
ALT SERPL-CCNC: 109 U/L (ref 12–78)
ANION GAP SERPL CALC-SCNC: 2 MMOL/L (ref 5–15)
APPEARANCE UR: CLEAR
APTT PPP: 25.8 SEC (ref 22.1–31)
AST SERPL-CCNC: 54 U/L (ref 15–37)
BACTERIA SPEC CULT: NORMAL
BILIRUB SERPL-MCNC: 0.3 MG/DL (ref 0.2–1)
BILIRUB UR QL: NEGATIVE
BUN SERPL-MCNC: 18 MG/DL (ref 6–20)
BUN/CREAT SERPL: 24 (ref 12–20)
CALCIUM SERPL-MCNC: 9.6 MG/DL (ref 8.5–10.1)
CHLORIDE SERPL-SCNC: 105 MMOL/L (ref 97–108)
CO2 SERPL-SCNC: 30 MMOL/L (ref 21–32)
COLOR UR: NORMAL
CREAT SERPL-MCNC: 0.75 MG/DL (ref 0.55–1.02)
ERYTHROCYTE [DISTWIDTH] IN BLOOD BY AUTOMATED COUNT: 12.5 % (ref 11.5–14.5)
EST. AVERAGE GLUCOSE BLD GHB EST-MCNC: 126 MG/DL
GLOBULIN SER CALC-MCNC: 3.4 G/DL (ref 2–4)
GLUCOSE SERPL-MCNC: 134 MG/DL (ref 65–100)
GLUCOSE UR STRIP.AUTO-MCNC: NEGATIVE MG/DL
HBA1C MFR BLD: 6 % (ref 4–5.6)
HCT VFR BLD AUTO: 45 % (ref 35–47)
HGB BLD-MCNC: 14.4 G/DL (ref 11.5–16)
HGB UR QL STRIP: NEGATIVE
INR PPP: 1 (ref 0.9–1.1)
KETONES UR QL STRIP.AUTO: NEGATIVE MG/DL
LEUKOCYTE ESTERASE UR QL STRIP.AUTO: NEGATIVE
MCH RBC QN AUTO: 30.1 PG (ref 26–34)
MCHC RBC AUTO-ENTMCNC: 32 G/DL (ref 30–36.5)
MCV RBC AUTO: 94.1 FL (ref 80–99)
NITRITE UR QL STRIP.AUTO: NEGATIVE
NRBC # BLD: 0 K/UL (ref 0–0.01)
NRBC BLD-RTO: 0 PER 100 WBC
PH UR STRIP: 7.5 (ref 5–8)
PLATELET # BLD AUTO: 317 K/UL (ref 150–400)
PMV BLD AUTO: 10.2 FL (ref 8.9–12.9)
POTASSIUM SERPL-SCNC: 4.2 MMOL/L (ref 3.5–5.1)
PROT SERPL-MCNC: 7.1 G/DL (ref 6.4–8.2)
PROT UR STRIP-MCNC: NEGATIVE MG/DL
PROTHROMBIN TIME: 10.3 SEC (ref 9–11.1)
RBC # BLD AUTO: 4.78 M/UL (ref 3.8–5.2)
SERVICE CMNT-IMP: NORMAL
SODIUM SERPL-SCNC: 137 MMOL/L (ref 136–145)
SP GR UR REFRACTOMETRY: 1.02 (ref 1–1.03)
T4 FREE SERPL-MCNC: 0.9 NG/DL (ref 0.8–1.5)
THERAPEUTIC RANGE: NORMAL SECS (ref 58–77)
TSH SERPL DL<=0.05 MIU/L-ACNC: 1.88 UIU/ML (ref 0.36–3.74)
UROBILINOGEN UR QL STRIP.AUTO: 0.2 EU/DL (ref 0.2–1)
WBC # BLD AUTO: 5.5 K/UL (ref 3.6–11)

## 2024-07-01 DIAGNOSIS — E03.9 ACQUIRED HYPOTHYROIDISM: ICD-10-CM

## 2024-07-01 RX ORDER — LEVOTHYROXINE SODIUM 75 UG/1
CAPSULE ORAL
Qty: 90 CAPSULE | Refills: 0 | OUTPATIENT
Start: 2024-07-01

## 2024-07-06 DIAGNOSIS — E03.9 ACQUIRED HYPOTHYROIDISM: ICD-10-CM

## 2024-07-06 RX ORDER — LEVOTHYROXINE SODIUM 75 UG/1
CAPSULE ORAL
Qty: 90 CAPSULE | Refills: 0 | OUTPATIENT
Start: 2024-07-06

## 2024-07-10 ENCOUNTER — TELEMEDICINE (OUTPATIENT)
Dept: PRIMARY CARE CLINIC | Facility: CLINIC | Age: 74
End: 2024-07-10
Payer: MEDICARE

## 2024-07-10 DIAGNOSIS — H10.9 BACTERIAL CONJUNCTIVITIS OF BOTH EYES: Primary | ICD-10-CM

## 2024-07-10 DIAGNOSIS — B96.89 BACTERIAL CONJUNCTIVITIS OF BOTH EYES: Primary | ICD-10-CM

## 2024-07-10 PROCEDURE — G8399 PT W/DXA RESULTS DOCUMENT: HCPCS | Performed by: FAMILY MEDICINE

## 2024-07-10 PROCEDURE — 99213 OFFICE O/P EST LOW 20 MIN: CPT | Performed by: FAMILY MEDICINE

## 2024-07-10 PROCEDURE — 1123F ACP DISCUSS/DSCN MKR DOCD: CPT | Performed by: FAMILY MEDICINE

## 2024-07-10 PROCEDURE — 1090F PRES/ABSN URINE INCON ASSESS: CPT | Performed by: FAMILY MEDICINE

## 2024-07-10 PROCEDURE — G8427 DOCREV CUR MEDS BY ELIG CLIN: HCPCS | Performed by: FAMILY MEDICINE

## 2024-07-10 ASSESSMENT — ENCOUNTER SYMPTOMS
EYE ITCHING: 1
EYE DISCHARGE: 1

## 2024-07-10 NOTE — PROGRESS NOTES
Ana Prather (:  1950) is a Established patient, here for evaluation of the following:  Eye Problem      Assessment & Plan   Below is the assessment and plan developed based on review of pertinent history, physical exam, labs, studies, and medications.  1. Bacterial conjunctivitis of both eyes  -     azithromycin (AZASITE) 1 % ophthalmic solution; 2 times daily x 7 days to affected eyes, Disp-2.5 mL, R-0Normal    Advised should follow up with Optho to have eyes checked. Follow up if no improvement in 2-3 days.          Subjective   Eye Problem   Associated symptoms include an eye discharge and itching. Pertinent negatives include no fever.       States her eye has been bothering her for 2 weeks. Both eyes. States her vision is \"messed up.\" States she had goo in her eyes. When she wakes up in the morning everything is crusted on her eyelids. States it is itchy. No contacts.     Patient Active Problem List   Diagnosis    Multinodular goiter    Hypothyroidism (acquired)    Skin cancer    Obesity, morbid (HCC)    Obesity, unspecified    Impaired glucose tolerance    Chronic fatigue syndrome    Cervical radiculopathy due to degenerative joint disease of spine    Supraventricular tachycardia, paroxysmal (HCC)    Carpal tunnel syndrome, bilateral upper limbs    Palpitations    Iatrogenic hyperthyroidism    Anxiety disorder    Fibromyalgia    Chest pain, unspecified    Osteoarthritis of right knee    Endometrial cancer (HCC)        Current Outpatient Medications on File Prior to Visit   Medication Sig Dispense Refill    thyroid (ARMOUR) 60 MG tablet Take 1 tablet by mouth daily      TIROSINT 75 MCG CAPS TAKE 1 TABLET BY MOUTH IN THE MORNING WITH WATER AND WAIT 30 TO 60 MINUTES BEFORE OTHER DRINK, FOOD, OR MEDS. TAKE CALCIUM, IRON, AND MULTI-VITAMINS 4 HOURS APART 30 capsule 0    NONFORMULARY H2 Hydrogen Dr Stephens 80mg magnesium in the morning      UNABLE TO FIND CBD cream   Knee pain      Multiple

## 2024-07-12 ENCOUNTER — TELEPHONE (OUTPATIENT)
Dept: PRIMARY CARE CLINIC | Facility: CLINIC | Age: 74
End: 2024-07-12

## 2024-07-12 DIAGNOSIS — H10.9 BACTERIAL CONJUNCTIVITIS OF BOTH EYES: ICD-10-CM

## 2024-07-12 DIAGNOSIS — B96.89 BACTERIAL CONJUNCTIVITIS OF BOTH EYES: ICD-10-CM

## 2024-07-12 DIAGNOSIS — E03.9 ACQUIRED HYPOTHYROIDISM: ICD-10-CM

## 2024-07-12 RX ORDER — ERYTHROMYCIN 5 MG/G
OINTMENT OPHTHALMIC
Qty: 1 G | Refills: 0 | Status: SHIPPED | OUTPATIENT
Start: 2024-07-12 | End: 2024-07-22

## 2024-07-12 RX ORDER — LEVOTHYROXINE SODIUM 75 UG/1
CAPSULE ORAL
Qty: 90 CAPSULE | Refills: 0 | OUTPATIENT
Start: 2024-07-12

## 2024-07-22 ENCOUNTER — OFFICE VISIT (OUTPATIENT)
Dept: PRIMARY CARE CLINIC | Facility: CLINIC | Age: 74
End: 2024-07-22
Payer: MEDICARE

## 2024-07-22 VITALS
HEIGHT: 65 IN | OXYGEN SATURATION: 97 % | DIASTOLIC BLOOD PRESSURE: 68 MMHG | WEIGHT: 243 LBS | SYSTOLIC BLOOD PRESSURE: 119 MMHG | RESPIRATION RATE: 20 BRPM | BODY MASS INDEX: 40.48 KG/M2 | TEMPERATURE: 98.8 F | HEART RATE: 76 BPM

## 2024-07-22 DIAGNOSIS — Z01.818 PRE-OP EVALUATION: ICD-10-CM

## 2024-07-22 DIAGNOSIS — Z13.1 ENCOUNTER FOR SCREENING FOR DIABETES MELLITUS: ICD-10-CM

## 2024-07-22 DIAGNOSIS — G89.29 CHRONIC PAIN OF RIGHT KNEE: Primary | ICD-10-CM

## 2024-07-22 DIAGNOSIS — M25.561 CHRONIC PAIN OF RIGHT KNEE: Primary | ICD-10-CM

## 2024-07-22 DIAGNOSIS — E03.9 HYPOTHYROIDISM (ACQUIRED): ICD-10-CM

## 2024-07-22 DIAGNOSIS — E66.01 OBESITY, MORBID (HCC): ICD-10-CM

## 2024-07-22 PROCEDURE — G8417 CALC BMI ABV UP PARAM F/U: HCPCS | Performed by: FAMILY MEDICINE

## 2024-07-22 PROCEDURE — 1090F PRES/ABSN URINE INCON ASSESS: CPT | Performed by: FAMILY MEDICINE

## 2024-07-22 PROCEDURE — G8399 PT W/DXA RESULTS DOCUMENT: HCPCS | Performed by: FAMILY MEDICINE

## 2024-07-22 PROCEDURE — 1123F ACP DISCUSS/DSCN MKR DOCD: CPT | Performed by: FAMILY MEDICINE

## 2024-07-22 PROCEDURE — 99214 OFFICE O/P EST MOD 30 MIN: CPT | Performed by: FAMILY MEDICINE

## 2024-07-22 PROCEDURE — G8427 DOCREV CUR MEDS BY ELIG CLIN: HCPCS | Performed by: FAMILY MEDICINE

## 2024-07-22 PROCEDURE — 1036F TOBACCO NON-USER: CPT | Performed by: FAMILY MEDICINE

## 2024-07-22 NOTE — PROGRESS NOTES
\"Have you been to the ER, urgent care clinic since your last visit?  Hospitalized since your last visit?\"    Yes [facility] eye     “Have you seen or consulted any other health care providers outside of [facility] since your last visit?”    NO            Click Here for Release of Records Request

## 2024-07-22 NOTE — PROGRESS NOTES
Preoperative Evaluation    Date of Exam: 2024    Ana Prather is a 74 y.o. female (:1950) who presents for preoperative evaluation.     Procedure/Surgery: 24    Date of Procedure/Surgery: R TKA    Surgeon: Dr. Antwan Mello MD    Hospital/Surgical Facility: [facility]    Primary Physician: Erika Thompson DO    Latex Allergy: yes    Recent use of: No recent use of aspirin (ASA), NSAIDS or steroids    Tetanus up to date: last tetanus booster within 10 years    Anesthesia Complications: Yes: Personal Hx states she has a hard time waking up from anesthesia     History of abnormal bleeding : None    History of Blood Transfusions: no    Health Care Directive or Living Will: not on file    Is able to climb a flight of stairs without chest pain or severe SOB      Allergies- reviewed:   Allergies   Allergen Reactions    Latex Other (See Comments) and Rash     Reaction Type: Allergy; Reaction(s): Rash, NOS,Swelling  Other reaction(s): Unknown      Cephalexin Anxiety, Diarrhea, Other (See Comments), Headaches and Nausea And Vomiting     Other reaction(s): GI intolerance  Other reaction(s): Diarrhea, Unknown    Ciprofloxacin Shortness Of Breath, Diarrhea, Nausea Only, Anxiety, Other (See Comments), Headaches and Myalgia       Cannot tolerate  Other reaction(s): Abdominal Pain, Abdominal Pain, Joint Pain, Lightheadedness    Gadolinium Derivatives Nausea And Vomiting, Palpitations, Other (See Comments), Headaches and Dizziness or Vertigo     MRI Contrast - shaking and trembling              Ibuprofen Hives           Iodinated Contrast Media Hives, Shortness Of Breath, Swelling and Other (See Comments)     Gets dizzy and shaky      Lidocaine Photosensitivity, Palpitations, Other (See Comments), Headaches and Dizziness or Vertigo     After breast biopsy, had a issue that she was advised was likely due to the Lidocaine.   Patient states causes shaking         Sulfa Antibiotics Diarrhea, Hives, Rash and Swelling

## 2024-07-25 LAB
APPEARANCE UR: CLEAR
BACTERIA #/AREA URNS HPF: NORMAL /[HPF]
BILIRUB UR QL STRIP: NEGATIVE
CASTS URNS QL MICRO: NORMAL /LPF
COLOR UR: YELLOW
EPI CELLS #/AREA URNS HPF: NORMAL /HPF (ref 0–10)
GLUCOSE UR QL STRIP: NEGATIVE
HGB UR QL STRIP: NEGATIVE
KETONES UR QL STRIP: NEGATIVE
LEUKOCYTE ESTERASE UR QL STRIP: NEGATIVE
MICRO URNS: NORMAL
MICRO URNS: NORMAL
NITRITE UR QL STRIP: NEGATIVE
PH UR STRIP: 7 [PH] (ref 5–7.5)
PROT UR QL STRIP: NEGATIVE
RBC #/AREA URNS HPF: NORMAL /HPF (ref 0–2)
SP GR UR STRIP: 1.01 (ref 1–1.03)
URINALYSIS REFLEX: NORMAL
UROBILINOGEN UR STRIP-MCNC: 0.2 MG/DL (ref 0.2–1)
WBC #/AREA URNS HPF: NORMAL /HPF (ref 0–5)

## 2024-08-01 ENCOUNTER — TELEPHONE (OUTPATIENT)
Dept: PRIMARY CARE CLINIC | Facility: CLINIC | Age: 74
End: 2024-08-01

## 2024-08-01 NOTE — TELEPHONE ENCOUNTER
Patient called to see if Dr. Thompson could fax over orders for a Diagnostic Mammogram for [facility] at Fax#[phone] per the request of Dr. Gracia Caballero.  Patient would like a call back to let her know if this can be done at Ph#[phone].

## 2024-08-02 DIAGNOSIS — Z12.39 BREAST CANCER SCREENING, HIGH RISK PATIENT: Primary | ICD-10-CM

## 2024-08-02 DIAGNOSIS — R92.8 OTHER ABNORMAL AND INCONCLUSIVE FINDINGS ON DIAGNOSTIC IMAGING OF BREAST: ICD-10-CM

## 2024-08-28 DIAGNOSIS — R79.89 ELEVATED FERRITIN: Primary | ICD-10-CM

## 2024-09-09 ENCOUNTER — OFFICE VISIT (OUTPATIENT)
Dept: PRIMARY CARE CLINIC | Facility: CLINIC | Age: 74
End: 2024-09-09
Payer: MEDICARE

## 2024-09-09 ENCOUNTER — TELEPHONE (OUTPATIENT)
Dept: PRIMARY CARE CLINIC | Facility: CLINIC | Age: 74
End: 2024-09-09

## 2024-09-09 VITALS
DIASTOLIC BLOOD PRESSURE: 82 MMHG | WEIGHT: 242 LBS | HEART RATE: 64 BPM | RESPIRATION RATE: 19 BRPM | SYSTOLIC BLOOD PRESSURE: 146 MMHG | OXYGEN SATURATION: 99 % | BODY MASS INDEX: 40.32 KG/M2 | HEIGHT: 65 IN

## 2024-09-09 DIAGNOSIS — R92.8 ABNORMAL MAMMOGRAM: Primary | ICD-10-CM

## 2024-09-09 DIAGNOSIS — E03.9 HYPOTHYROIDISM, UNSPECIFIED TYPE: ICD-10-CM

## 2024-09-09 DIAGNOSIS — R73.03 PREDIABETES: Primary | ICD-10-CM

## 2024-09-09 DIAGNOSIS — E78.5 HYPERLIPIDEMIA, UNSPECIFIED HYPERLIPIDEMIA TYPE: ICD-10-CM

## 2024-09-09 DIAGNOSIS — R79.89 ELEVATED FERRITIN: ICD-10-CM

## 2024-09-09 DIAGNOSIS — A49.8 RECURRENT CLOSTRIDIOIDES DIFFICILE INFECTION: ICD-10-CM

## 2024-09-09 DIAGNOSIS — K76.0 NAFLD (NONALCOHOLIC FATTY LIVER DISEASE): ICD-10-CM

## 2024-09-09 PROCEDURE — G8417 CALC BMI ABV UP PARAM F/U: HCPCS | Performed by: FAMILY MEDICINE

## 2024-09-09 PROCEDURE — 1036F TOBACCO NON-USER: CPT | Performed by: FAMILY MEDICINE

## 2024-09-09 PROCEDURE — 1090F PRES/ABSN URINE INCON ASSESS: CPT | Performed by: FAMILY MEDICINE

## 2024-09-09 PROCEDURE — G8427 DOCREV CUR MEDS BY ELIG CLIN: HCPCS | Performed by: FAMILY MEDICINE

## 2024-09-09 PROCEDURE — 99214 OFFICE O/P EST MOD 30 MIN: CPT | Performed by: FAMILY MEDICINE

## 2024-09-09 PROCEDURE — 1123F ACP DISCUSS/DSCN MKR DOCD: CPT | Performed by: FAMILY MEDICINE

## 2024-09-09 PROCEDURE — G8399 PT W/DXA RESULTS DOCUMENT: HCPCS | Performed by: FAMILY MEDICINE

## 2024-09-09 RX ORDER — NALTREXONE HYDROCHLORIDE 50 MG/1
4.5 TABLET, FILM COATED ORAL DAILY
COMMUNITY

## 2024-10-04 ENCOUNTER — TELEPHONE (OUTPATIENT)
Dept: PRIMARY CARE CLINIC | Facility: CLINIC | Age: 74
End: 2024-10-04

## 2024-10-09 NOTE — TELEPHONE ENCOUNTER
Patient reports muscle/joint pain and aches.  Stool is yellow.   She eating a lot and drinking fluids.  Feeling lightheaded and \"out of it\"

## 2024-10-10 ENCOUNTER — TELEPHONE (OUTPATIENT)
Dept: PRIMARY CARE CLINIC | Facility: CLINIC | Age: 74
End: 2024-10-10

## 2024-10-10 ENCOUNTER — HOSPITAL ENCOUNTER (EMERGENCY)
Facility: HOSPITAL | Age: 74
Discharge: HOME | End: 2024-10-10
Attending: STUDENT IN AN ORGANIZED HEALTH CARE EDUCATION/TRAINING PROGRAM
Payer: MEDICARE

## 2024-10-10 ENCOUNTER — APPOINTMENT (OUTPATIENT)
Facility: HOSPITAL | Age: 74
End: 2024-10-10
Payer: MEDICARE

## 2024-10-10 VITALS
SYSTOLIC BLOOD PRESSURE: 134 MMHG | DIASTOLIC BLOOD PRESSURE: 79 MMHG | RESPIRATION RATE: 16 BRPM | TEMPERATURE: 97.9 F | HEART RATE: 58 BPM | OXYGEN SATURATION: 93 % | WEIGHT: 245.15 LBS | BODY MASS INDEX: 40.8 KG/M2

## 2024-10-10 DIAGNOSIS — R19.7 DIARRHEA, UNSPECIFIED TYPE: Primary | ICD-10-CM

## 2024-10-10 LAB
ALBUMIN SERPL-MCNC: 3.5 G/DL (ref 3.5–5)
ALBUMIN/GLOB SERPL: 1.1 (ref 1.1–2.2)
ALP SERPL-CCNC: 67 U/L (ref 45–117)
ALT SERPL-CCNC: 59 U/L (ref 12–78)
ANION GAP SERPL CALC-SCNC: 10 MMOL/L (ref 2–12)
APPEARANCE UR: CLEAR
AST SERPL-CCNC: 37 U/L (ref 15–37)
BACTERIA URNS QL MICRO: NEGATIVE /HPF
BASOPHILS # BLD: 0.1 K/UL (ref 0–0.1)
BASOPHILS NFR BLD: 1 % (ref 0–1)
BILIRUB SERPL-MCNC: 0.4 MG/DL (ref 0.2–1)
BILIRUB UR QL: NEGATIVE
BUN SERPL-MCNC: 13 MG/DL (ref 6–20)
BUN/CREAT SERPL: 18 (ref 12–20)
CALCIUM SERPL-MCNC: 9 MG/DL (ref 8.5–10.1)
CHLORIDE SERPL-SCNC: 105 MMOL/L (ref 97–108)
CO2 SERPL-SCNC: 26 MMOL/L (ref 21–32)
COLOR UR: NORMAL
CREAT SERPL-MCNC: 0.72 MG/DL (ref 0.55–1.02)
DIFFERENTIAL METHOD BLD: NORMAL
EOSINOPHIL # BLD: 0.1 K/UL (ref 0–0.4)
EOSINOPHIL NFR BLD: 3 % (ref 0–7)
EPITH CASTS URNS QL MICRO: NORMAL /LPF
ERYTHROCYTE [DISTWIDTH] IN BLOOD BY AUTOMATED COUNT: 12.8 % (ref 11.5–14.5)
GLOBULIN SER CALC-MCNC: 3.3 G/DL (ref 2–4)
GLUCOSE SERPL-MCNC: 146 MG/DL (ref 65–100)
GLUCOSE UR STRIP.AUTO-MCNC: NEGATIVE MG/DL
HCT VFR BLD AUTO: 41.5 % (ref 35–47)
HGB BLD-MCNC: 13.8 G/DL (ref 11.5–16)
HGB UR QL STRIP: NEGATIVE
IMM GRANULOCYTES # BLD AUTO: 0 K/UL (ref 0–0.04)
IMM GRANULOCYTES NFR BLD AUTO: 0 % (ref 0–0.5)
KETONES UR QL STRIP.AUTO: NEGATIVE MG/DL
LEUKOCYTE ESTERASE UR QL STRIP.AUTO: NEGATIVE
LYMPHOCYTES # BLD: 1.2 K/UL (ref 0.8–3.5)
LYMPHOCYTES NFR BLD: 27 % (ref 12–49)
MCH RBC QN AUTO: 30 PG (ref 26–34)
MCHC RBC AUTO-ENTMCNC: 33.3 G/DL (ref 30–36.5)
MCV RBC AUTO: 90.2 FL (ref 80–99)
MONOCYTES # BLD: 0.4 K/UL (ref 0–1)
MONOCYTES NFR BLD: 8 % (ref 5–13)
NEUTS SEG # BLD: 2.9 K/UL (ref 1.8–8)
NEUTS SEG NFR BLD: 61 % (ref 32–75)
NITRITE UR QL STRIP.AUTO: NEGATIVE
NRBC # BLD: 0 K/UL (ref 0–0.01)
NRBC BLD-RTO: 0 PER 100 WBC
PH UR STRIP: 6.5 (ref 5–8)
PLATELET # BLD AUTO: 296 K/UL (ref 150–400)
PMV BLD AUTO: 10.2 FL (ref 8.9–12.9)
POTASSIUM SERPL-SCNC: 4.3 MMOL/L (ref 3.5–5.1)
PROT SERPL-MCNC: 6.8 G/DL (ref 6.4–8.2)
PROT UR STRIP-MCNC: NEGATIVE MG/DL
RBC # BLD AUTO: 4.6 M/UL (ref 3.8–5.2)
RBC #/AREA URNS HPF: NORMAL /HPF (ref 0–5)
SODIUM SERPL-SCNC: 141 MMOL/L (ref 136–145)
SP GR UR REFRACTOMETRY: 1.01 (ref 1–1.03)
URINE CULTURE IF INDICATED: NORMAL
UROBILINOGEN UR QL STRIP.AUTO: 0.2 EU/DL (ref 0.2–1)
WBC # BLD AUTO: 4.7 K/UL (ref 3.6–11)
WBC URNS QL MICRO: NORMAL /HPF (ref 0–4)

## 2024-10-10 PROCEDURE — 81001 URINALYSIS AUTO W/SCOPE: CPT

## 2024-10-10 PROCEDURE — 85025 COMPLETE CBC W/AUTO DIFF WBC: CPT

## 2024-10-10 PROCEDURE — 80053 COMPREHEN METABOLIC PANEL: CPT

## 2024-10-10 PROCEDURE — 6370000000 HC RX 637 (ALT 250 FOR IP): Performed by: STUDENT IN AN ORGANIZED HEALTH CARE EDUCATION/TRAINING PROGRAM

## 2024-10-10 PROCEDURE — 74176 CT ABD & PELVIS W/O CONTRAST: CPT

## 2024-10-10 PROCEDURE — 99284 EMERGENCY DEPT VISIT MOD MDM: CPT

## 2024-10-10 RX ORDER — ACETAMINOPHEN 500 MG
500 TABLET ORAL
Status: COMPLETED | OUTPATIENT
Start: 2024-10-10 | End: 2024-10-10

## 2024-10-10 RX ORDER — CALCIUM CARBONATE 500 MG/1
1 TABLET, CHEWABLE ORAL DAILY
COMMUNITY

## 2024-10-10 RX ADMIN — ACETAMINOPHEN 500 MG: 500 TABLET ORAL at 16:08

## 2024-10-10 ASSESSMENT — PAIN SCALES - GENERAL
PAINLEVEL_OUTOF10: 6
PAINLEVEL_OUTOF10: 7

## 2024-10-10 ASSESSMENT — PAIN - FUNCTIONAL ASSESSMENT
PAIN_FUNCTIONAL_ASSESSMENT: 0-10
PAIN_FUNCTIONAL_ASSESSMENT: PREVENTS OR INTERFERES WITH MANY ACTIVE NOT PASSIVE ACTIVITIES

## 2024-10-10 ASSESSMENT — PAIN DESCRIPTION - DESCRIPTORS
DESCRIPTORS: ACHING
DESCRIPTORS: ACHING

## 2024-10-10 ASSESSMENT — LIFESTYLE VARIABLES
HOW OFTEN DO YOU HAVE A DRINK CONTAINING ALCOHOL: NEVER
HOW MANY STANDARD DRINKS CONTAINING ALCOHOL DO YOU HAVE ON A TYPICAL DAY: PATIENT DOES NOT DRINK

## 2024-10-10 ASSESSMENT — PAIN DESCRIPTION - PAIN TYPE: TYPE: ACUTE PAIN

## 2024-10-10 ASSESSMENT — PAIN DESCRIPTION - LOCATION
LOCATION: ABDOMEN
LOCATION: HEAD

## 2024-10-10 NOTE — ED NOTES
Verbal shift change report given to HESHAM Mendoza (oncoming nurse) by HESHAM Yang (offgoing nurse). Report included the following information ED Encounter Summary, ED SBAR, and Recent Results.

## 2024-10-10 NOTE — TELEPHONE ENCOUNTER
Pt  walked in stating that the pt is requesting for Dr. Thompson to order her a stool CDIF order and if she can send it to [facility]

## 2024-10-10 NOTE — ED PROVIDER NOTES
[facility]  EMERGENCY DEPARTMENT ENCOUNTER      Pt Name: Ana Prather  MRN: [id number]  Birthdate 1950  Date of evaluation: 10/10/2024  Provider: Cha Mallory MD    CHIEF COMPLAINT       Chief Complaint   Patient presents with    Diarrhea         HISTORY OF PRESENT ILLNESS   (Location/Symptom, Timing/Onset, Context/Setting, Quality, Duration, Modifying Factors, Severity)  Note limiting factors.   The history is provided by the patient.     74-year-old female with a history of anxiety, ADHD, chronic back pain, GERD, fibromyalgia, hypothyroidism, obesity, ALBERTO, osteoarthritis, prediabetes restless leg syndrome presenting for diarrhea.  Reports that she first had an episode of diarrhea on Thursday had 6 episodes on Thursday, watery and yellow, did not have any diarrhea on Friday Saturday Sunday Monday had 4 episodes on Wednesday, no diarrhea today, has not had any significant abdominal pain but has felt bloated.  Reports that she has been burping more.  Reports that she is on an antacid, has been taking it at night, was not aware that she needed to take it in the morning.  Reports no nausea or vomiting.  Reports no fevers or chills.  Reports generalized fatigue.  Reports no recent sick contacts or travel.  Does report history of C. difficile last July, no recent antibiotics or hospitalizations    Review of External Medical Records:         Nursing Notes were reviewed.      REVIEW OF SYSTEMS    (2-9 systems for level 4, 10 or more for level 5)     Except as noted above the remainder of the review of systems was reviewed and negative.       PAST MEDICAL HISTORY     Past Medical History:   Diagnosis Date    ADHD (attention deficit hyperactivity disorder)     Anxiety     BPPV (benign paroxysmal positional vertigo)     Chronic back pain     Chronic fatigue syndrome 3/16/2012    Clostridium difficile infection     Fibromyalgia     GERD (gastroesophageal reflux disease) 2015    Hearing loss               Body mass index is 40.8 kg/m².    Physical Exam  Vitals reviewed.   Constitutional:       General: She is not in acute distress.     Appearance: Normal appearance. She is not ill-appearing, toxic-appearing or diaphoretic.   HENT:      Head: Normocephalic and atraumatic.      Nose: Nose normal.   Eyes:      Conjunctiva/sclera: Conjunctivae normal.   Cardiovascular:      Rate and Rhythm: Normal rate.      Heart sounds: Normal heart sounds.   Pulmonary:      Effort: Pulmonary effort is normal. No respiratory distress.      Breath sounds: Normal breath sounds. No wheezing or rales.   Abdominal:      General: Abdomen is flat. There is no distension.      Palpations: Abdomen is soft.      Tenderness: There is no abdominal tenderness. There is no guarding or rebound.   Musculoskeletal:      Cervical back: Neck supple.      Right lower leg: No edema.      Left lower leg: No edema.   Skin:     General: Skin is warm and dry.   Neurological:      Mental Status: She is alert. Mental status is at baseline.   Psychiatric:         Mood and Affect: Mood normal.         Behavior: Behavior normal.           DIAGNOSTIC RESULTS     EKG: All EKG's are interpreted by the Emergency Department Physician who either signs or Co-signs this chart in the absence of a cardiologist.        RADIOLOGY:   Non-plain film images such as CT, Ultrasound and MRI are read by the radiologist. Plain radiographic images are visualized and preliminarily interpreted by the emergency physician as documented in ED course.      Interpretation per the Radiologist below, if available at the time of this note:    CT ABDOMEN PELVIS WO CONTRAST Additional Contrast? None   Final Result      1. No acute process on noncontrast CT.   2. No nephrolithiasis or hydronephrosis.   3. Hepatic steatosis. Other incidental findings are described above.      Electronically signed by Brendon Suarez           LABS:  Labs Reviewed   COMPREHENSIVE METABOLIC PANEL - Abnormal;

## 2024-10-10 NOTE — TELEPHONE ENCOUNTER
Called patient and told her that dr hayes recommends she go to the ER for labs and IVF. Patient said she will go do that today.

## 2024-10-10 NOTE — ED NOTES
Patient A&Ox4 with spouse at bedside. Discharge instructions reviewed and given to patient. Patient voiced understanding and denied any further questions. Patient ambulatory to car with [place] with spouse.

## 2024-10-11 NOTE — TELEPHONE ENCOUNTER
Called patient back. No answer left vm to call office back.     This cannot be placed without seeing patient in the office first. If patient would like this ordered, please schedule in office appt with Dr. Thompson.    No pertinent past medical history

## 2024-10-16 ENCOUNTER — TELEMEDICINE (OUTPATIENT)
Dept: PRIMARY CARE CLINIC | Facility: CLINIC | Age: 74
End: 2024-10-16

## 2024-10-16 DIAGNOSIS — E03.9 HYPOTHYROIDISM (ACQUIRED): ICD-10-CM

## 2024-10-16 DIAGNOSIS — R73.02 IMPAIRED GLUCOSE TOLERANCE: ICD-10-CM

## 2024-10-16 DIAGNOSIS — E55.9 VITAMIN D DEFICIENCY: ICD-10-CM

## 2024-10-16 DIAGNOSIS — R19.7 DIARRHEA, UNSPECIFIED TYPE: Primary | ICD-10-CM

## 2024-10-16 SDOH — ECONOMIC STABILITY: INCOME INSECURITY: HOW HARD IS IT FOR YOU TO PAY FOR THE VERY BASICS LIKE FOOD, HOUSING, MEDICAL CARE, AND HEATING?: NOT HARD AT ALL

## 2024-10-16 SDOH — ECONOMIC STABILITY: FOOD INSECURITY: WITHIN THE PAST 12 MONTHS, THE FOOD YOU BOUGHT JUST DIDN'T LAST AND YOU DIDN'T HAVE MONEY TO GET MORE.: NEVER TRUE

## 2024-10-16 SDOH — ECONOMIC STABILITY: FOOD INSECURITY: WITHIN THE PAST 12 MONTHS, YOU WORRIED THAT YOUR FOOD WOULD RUN OUT BEFORE YOU GOT MONEY TO BUY MORE.: NEVER TRUE

## 2024-10-16 ASSESSMENT — PATIENT HEALTH QUESTIONNAIRE - PHQ9
SUM OF ALL RESPONSES TO PHQ QUESTIONS 1-9: 0
2. FEELING DOWN, DEPRESSED OR HOPELESS: NOT AT ALL
SUM OF ALL RESPONSES TO PHQ QUESTIONS 1-9: 0
SUM OF ALL RESPONSES TO PHQ9 QUESTIONS 1 & 2: 0
1. LITTLE INTEREST OR PLEASURE IN DOING THINGS: NOT AT ALL

## 2024-10-16 NOTE — PROGRESS NOTES
Ana Prather, was evaluated through a synchronous (real-time) audio-video encounter. The patient (or guardian if applicable) is aware that this is a billable service, which includes applicable co-pays. This Virtual Visit was conducted with patient's (and/or legal guardian's) consent. Patient identification was verified, and a caregiver was present when appropriate.   The patient was located at Home: [address] [place] [id number]  Provider was located at Home (Appt Dept State): [place]  Confirm you are appropriately licensed, registered, or certified to deliver care in the state where the patient is located as indicated above. If you are not or unsure, please re-schedule the visit: Yes, I confirm.     Ana Prather (:  1950) is a Established patient, presenting virtually for evaluation of the following:      Below is the assessment and plan developed based on review of pertinent history, physical exam, labs, studies, and medications.     Assessment & Plan  Diarrhea, unspecified type       Orders:    Clostridium Difficile Toxin/Antigen; Future    Culture, Stool; Future    Impaired glucose tolerance       Orders:    Hemoglobin A1C; Future    Hypothyroidism (acquired)       Orders:    T4, Free; Future    TSH; Future    Vitamin D deficiency       Orders:    Vitamin D 25 Hydroxy; Future    Loose stool and nonspecific symptoms.   Recent evaluation at ER. Lab results reviewed. Abd CT unremarkable.  Advised to follow up with pcp.  Advised to seek care if new or worsened symptoms develop.              Subjective   HPI    C/o generally feeling unwell.  Hx c diff in . Treated.   Has loose stool in the past week.  BM about 3 times per day.  No fevers.  Denies signs of bleeding.   No vomiting.   No dysuria.     Hypothyroidism. Reports she is compliant with meds as prescribed.        Review of Systems   Denies chest pain or dyspnea.       Objective     Physical Exam    Constitutional: [x]

## 2024-10-16 NOTE — PROGRESS NOTES
Chief Complaint   Patient presents with    GI Problem     Requesting labs for possible     \"Have you been to the ER, urgent care clinic since your last visit?  Hospitalized since your last visit?\"    NO    “Have you seen or consulted any other health care providers outside our system since your last visit?”    NO

## 2024-10-18 ENCOUNTER — TELEPHONE (OUTPATIENT)
Dept: PRIMARY CARE CLINIC | Facility: CLINIC | Age: 74
End: 2024-10-18

## 2024-10-18 NOTE — TELEPHONE ENCOUNTER
Called patient notified her that unfortunately stool collected was in the wrong tube. the stool needs to collected in the orange power pack tube. Patient stated she will go to the lab to get another collection kit.

## 2024-10-18 NOTE — TELEPHONE ENCOUNTER
Spoke to lab they state the stool collected was in the wrong tube.She states the stool needs to collected in the orange power pack tube.

## 2024-10-21 LAB
25(OH)D3+25(OH)D2 SERPL-MCNC: 44.1 NG/ML (ref 30–100)
EST. AVERAGE GLUCOSE BLD GHB EST-MCNC: 123 MG/DL
HBA1C MFR BLD: 5.9 %HB
T4 FREE SERPL-MCNC: 1.12 NG/DL (ref 0.82–1.77)
TSH SERPL DL<=0.005 MIU/L-ACNC: 1.82 UIU/ML (ref 0.45–4.5)

## 2024-10-23 SDOH — HEALTH STABILITY: PHYSICAL HEALTH: ON AVERAGE, HOW MANY DAYS PER WEEK DO YOU ENGAGE IN MODERATE TO STRENUOUS EXERCISE (LIKE A BRISK WALK)?: 0 DAYS

## 2024-10-23 SDOH — HEALTH STABILITY: PHYSICAL HEALTH: ON AVERAGE, HOW MANY MINUTES DO YOU ENGAGE IN EXERCISE AT THIS LEVEL?: 0 MIN

## 2024-10-24 ENCOUNTER — OFFICE VISIT (OUTPATIENT)
Age: 74
End: 2024-10-24

## 2024-10-24 VITALS — WEIGHT: 245 LBS | BODY MASS INDEX: 40.82 KG/M2 | HEIGHT: 65 IN

## 2024-10-24 DIAGNOSIS — M17.12 PRIMARY OSTEOARTHRITIS OF LEFT KNEE: Primary | ICD-10-CM

## 2024-10-24 DIAGNOSIS — M17.11 PRIMARY OSTEOARTHRITIS OF RIGHT KNEE: ICD-10-CM

## 2024-10-24 DIAGNOSIS — Z01.818 PRE-OP TESTING: ICD-10-CM

## 2024-10-24 LAB — C DIFF TOX A+B STL QL IA: NEGATIVE

## 2024-10-24 NOTE — PROGRESS NOTES
be reviewed and then a final decision on a follow-up appointment will be made regarding proceeding with surgery.  Patient has been made aware.    Inpatient status: The patient has admitted to severe pain in the affected knee and due to such pain they are unable to complete activities of daily living at home and/or work on a regular basis where conservative treatments have failed. After extensive discussion with the patient, they have chosen to receive a total knee replacement with the expectation of inpatient procedure. Their dependent functional status (i.e. lack of capable support and safety at home, pain management, comorbities, or difficulty ambulating with assistive walking devices) would deem them a candidate for an inpatient stay. The patient acknowledges and understand the plan.    The risks of surgery were explained to the patient which include but not limited to infection, nerve injury, artery injury, tendon injury, poor result, poor wound healing, unforeseen incidence, bleeding, infection, nerve damage, failure to improve, worsening of symptoms, morbidity, and mortality risks were explained. All questions were answered. Patient was told of no guarantees. Patient accepts all risks and benefits. A consent for surgery will be documented and signed by the patient or a legal guardian. All questions were answered. The procedure was explained in detail.     The patient was counseled about the risks of susan Covid-19 during their perioperative period and any recovery window from their procedure. The patient was made aware that susan Covid-19 may worsen their prognosis for recovering from their procedure and lend to a higher morbidity and/or mortality risk. All material risks, benefits, and reasonable alternatives including postponing the procedure were discussed. The patient DOES wish to proceed with their procedure at this time.        Encounter Diagnoses   Name Primary?    Primary osteoarthritis of

## 2024-10-27 LAB
CAMPYLOBACTER STL CULT: NORMAL
E COLI SXT STL QL IA: NEGATIVE
SALM + SHIG STL CULT: NORMAL

## 2024-12-23 ENCOUNTER — HOSPITAL ENCOUNTER (EMERGENCY)
Facility: HOSPITAL | Age: 74
Discharge: HOME | End: 2024-12-23
Attending: EMERGENCY MEDICINE
Payer: MEDICARE

## 2024-12-23 ENCOUNTER — APPOINTMENT (OUTPATIENT)
Facility: HOSPITAL | Age: 74
End: 2024-12-23
Payer: MEDICARE

## 2024-12-23 VITALS
OXYGEN SATURATION: 96 % | HEART RATE: 64 BPM | WEIGHT: 246.69 LBS | BODY MASS INDEX: 41.05 KG/M2 | TEMPERATURE: 98.4 F | SYSTOLIC BLOOD PRESSURE: 180 MMHG | DIASTOLIC BLOOD PRESSURE: 74 MMHG | RESPIRATION RATE: 16 BRPM

## 2024-12-23 DIAGNOSIS — S22.009A CLOSED FRACTURE OF TRANSVERSE PROCESS OF THORACIC VERTEBRA, INITIAL ENCOUNTER (HCC): Primary | ICD-10-CM

## 2024-12-23 PROCEDURE — 71250 CT THORAX DX C-: CPT

## 2024-12-23 PROCEDURE — 99284 EMERGENCY DEPT VISIT MOD MDM: CPT

## 2024-12-23 RX ORDER — PREDNISONE 20 MG/1
20 TABLET ORAL DAILY
Qty: 5 TABLET | Refills: 0 | Status: SHIPPED | OUTPATIENT
Start: 2024-12-23 | End: 2024-12-28

## 2024-12-23 ASSESSMENT — ENCOUNTER SYMPTOMS
SHORTNESS OF BREATH: 0
VOMITING: 0
BACK PAIN: 0
COUGH: 0
ABDOMINAL PAIN: 0
DIARRHEA: 0
NAUSEA: 0
COLOR CHANGE: 0
TROUBLE SWALLOWING: 0
CHEST TIGHTNESS: 0

## 2024-12-23 ASSESSMENT — PAIN DESCRIPTION - LOCATION: LOCATION: RIB CAGE

## 2024-12-23 ASSESSMENT — PAIN DESCRIPTION - ORIENTATION: ORIENTATION: RIGHT

## 2024-12-23 ASSESSMENT — PAIN SCALES - GENERAL: PAINLEVEL_OUTOF10: 7

## 2024-12-23 ASSESSMENT — PAIN DESCRIPTION - DESCRIPTORS: DESCRIPTORS: OTHER (COMMENT)

## 2024-12-23 ASSESSMENT — PAIN - FUNCTIONAL ASSESSMENT
PAIN_FUNCTIONAL_ASSESSMENT: 0-10
PAIN_FUNCTIONAL_ASSESSMENT: PREVENTS OR INTERFERES SOME ACTIVE ACTIVITIES AND ADLS

## 2024-12-23 NOTE — ED PROVIDER NOTES
should her condition change.        CONSULTS:  None    PROCEDURES:  Unless otherwise noted below, none     Procedures      FINAL IMPRESSION      1. Closed fracture of transverse process of thoracic vertebra, initial encounter ([facility])          DISPOSITION/PLAN   DISPOSITION Decision To Discharge 12/23/2024 11:48:49 AM      PATIENT REFERRED TO:  Erika Thompson DO  [address]  [facility] [id number] [phone]      As needed, If symptoms worsen    Igor Gonzalez MD  [address]  [address]  [place] [id number]  [phone]      Reevaluation    Osei Escalante MD  [address]   [address]  [place] [id number]  [phone]      Reevaluation      DISCHARGE MEDICATIONS:  Discharge Medication List as of 12/23/2024 11:50 AM        START taking these medications    Details   predniSONE (DELTASONE) 20 MG tablet Take 1 tablet by mouth daily for 5 days, Disp-5 tablet, R-0Normal               (Please note that portions of this note were completed with a voice recognition program.  Efforts were made to edit the dictations but occasionally words are mis-transcribed.)    MIRA Portillo NP (electronically signed)  Emergency Attending Physician / Physician Assistant / Nurse Practitioner             Carmen Costa APRN - NP  12/23/24 1800

## 2024-12-23 NOTE — ED TRIAGE NOTES
Pt stated her ribs have been killing her, pt c/o right rib pain, unable to roll to the left or lift her right arm up , denies injury , pt rambling in triage, denies cough, pt stated she has a fever, 98.4,  because her normal temp is 97 c/o swollen eyes, her bone is sore, etc

## 2024-12-26 ENCOUNTER — TELEPHONE (OUTPATIENT)
Dept: PRIMARY CARE CLINIC | Facility: CLINIC | Age: 74
End: 2024-12-26

## 2024-12-26 NOTE — TELEPHONE ENCOUNTER
Patient possibly fractured her spinal bones from a fall and has scheduled the first available appt with Dr Thompson on the 9th of January.  She wants to know if there is anything else she should be doing to help her back.

## 2024-12-26 NOTE — TELEPHONE ENCOUNTER
Called patient back and recommended Ortho on call if she is having uncontrolled pain. She said that she is okay and wanting recommendations for exercises. She had blood work at [facility]. (Scanned into chart)    I told her that we will go over everything at her appointment.

## 2024-12-26 NOTE — TELEPHONE ENCOUNTER
Patient stated that she has a T4 fracture, she compltede blood work.  She wanted recommendations of what to do at home until her appointment to help with the pain. She said she will call back everyday in the mornings to see if there are any cancellations

## 2024-12-31 ENCOUNTER — TELEPHONE (OUTPATIENT)
Dept: PRIMARY CARE CLINIC | Facility: CLINIC | Age: 74
End: 2024-12-31

## 2025-01-02 NOTE — TELEPHONE ENCOUNTER
Called and spoke to patient. Patient was calling to inquire about an earlier appt. However, no earlier appts are currently available.     Advised patient to seek care at Ortho on call. She stated she will likely go there. No other questions at this time.

## 2025-01-08 ENCOUNTER — TELEPHONE (OUTPATIENT)
Dept: PRIMARY CARE CLINIC | Facility: CLINIC | Age: 75
End: 2025-01-08

## 2025-01-08 NOTE — TELEPHONE ENCOUNTER
Called and spoke with the patient about r/s appointment due to water advisory. Patient has another appointment on 1/13 to discuss concerns. I told her we cannot guarantee anything right now as we are closing the office last minute.

## 2025-01-13 ENCOUNTER — HOSPITAL ENCOUNTER (OUTPATIENT)
Facility: HOSPITAL | Age: 75
Discharge: HOME | End: 2025-01-16
Payer: MEDICARE

## 2025-01-13 ENCOUNTER — OFFICE VISIT (OUTPATIENT)
Dept: PRIMARY CARE CLINIC | Facility: CLINIC | Age: 75
End: 2025-01-13
Payer: MEDICARE

## 2025-01-13 VITALS
HEIGHT: 65 IN | OXYGEN SATURATION: 97 % | HEART RATE: 70 BPM | TEMPERATURE: 97.8 F | WEIGHT: 244.6 LBS | RESPIRATION RATE: 18 BRPM | DIASTOLIC BLOOD PRESSURE: 80 MMHG | SYSTOLIC BLOOD PRESSURE: 130 MMHG | BODY MASS INDEX: 40.75 KG/M2

## 2025-01-13 DIAGNOSIS — Z78.0 POST-MENOPAUSAL: ICD-10-CM

## 2025-01-13 DIAGNOSIS — S22.009A CLOSED FRACTURE OF TRANSVERSE PROCESS OF THORACIC VERTEBRA, INITIAL ENCOUNTER (HCC): ICD-10-CM

## 2025-01-13 DIAGNOSIS — H01.003 BLEPHARITIS OF BOTH EYES, UNSPECIFIED EYELID, UNSPECIFIED TYPE: Primary | ICD-10-CM

## 2025-01-13 DIAGNOSIS — R79.89 ELEVATED FERRITIN: ICD-10-CM

## 2025-01-13 DIAGNOSIS — H01.006 BLEPHARITIS OF BOTH EYES, UNSPECIFIED EYELID, UNSPECIFIED TYPE: Primary | ICD-10-CM

## 2025-01-13 DIAGNOSIS — R51.9 TEMPORAL HEADACHE: ICD-10-CM

## 2025-01-13 DIAGNOSIS — H10.9 CONJUNCTIVITIS OF BOTH EYES, UNSPECIFIED CONJUNCTIVITIS TYPE: ICD-10-CM

## 2025-01-13 PROBLEM — C54.1 ENDOMETRIAL CANCER (HCC): Status: RESOLVED | Noted: 2024-02-08 | Resolved: 2025-01-13

## 2025-01-13 PROCEDURE — 72072 X-RAY EXAM THORAC SPINE 3VWS: CPT

## 2025-01-13 PROCEDURE — 99215 OFFICE O/P EST HI 40 MIN: CPT | Performed by: FAMILY MEDICINE

## 2025-01-13 PROCEDURE — G8399 PT W/DXA RESULTS DOCUMENT: HCPCS | Performed by: FAMILY MEDICINE

## 2025-01-13 PROCEDURE — 1036F TOBACCO NON-USER: CPT | Performed by: FAMILY MEDICINE

## 2025-01-13 PROCEDURE — G8427 DOCREV CUR MEDS BY ELIG CLIN: HCPCS | Performed by: FAMILY MEDICINE

## 2025-01-13 PROCEDURE — 1126F AMNT PAIN NOTED NONE PRSNT: CPT | Performed by: FAMILY MEDICINE

## 2025-01-13 PROCEDURE — 1123F ACP DISCUSS/DSCN MKR DOCD: CPT | Performed by: FAMILY MEDICINE

## 2025-01-13 PROCEDURE — 1090F PRES/ABSN URINE INCON ASSESS: CPT | Performed by: FAMILY MEDICINE

## 2025-01-13 PROCEDURE — G8417 CALC BMI ABV UP PARAM F/U: HCPCS | Performed by: FAMILY MEDICINE

## 2025-01-13 RX ORDER — ERYTHROMYCIN 5 MG/G
OINTMENT OPHTHALMIC
Qty: 3.5 G | Refills: 1 | Status: SHIPPED | OUTPATIENT
Start: 2025-01-13 | End: 2025-01-23

## 2025-01-13 ASSESSMENT — PATIENT HEALTH QUESTIONNAIRE - PHQ9
SUM OF ALL RESPONSES TO PHQ QUESTIONS 1-9: 0
SUM OF ALL RESPONSES TO PHQ QUESTIONS 1-9: 0
SUM OF ALL RESPONSES TO PHQ9 QUESTIONS 1 & 2: 0
2. FEELING DOWN, DEPRESSED OR HOPELESS: NOT AT ALL
SUM OF ALL RESPONSES TO PHQ QUESTIONS 1-9: 0
SUM OF ALL RESPONSES TO PHQ QUESTIONS 1-9: 0
1. LITTLE INTEREST OR PLEASURE IN DOING THINGS: NOT AT ALL

## 2025-01-13 NOTE — PROGRESS NOTES
HPI     Chief Complaint   Patient presents with    Sinus Problem     Patients eyes are itchy, sinuses    Fatigue    Trauma     Spine fracture    Blood Sugar Problem     Patient is concerned about glucose       History of Present Illness  The patient is a 74-year-old female who presents for follow-up.    She has been experiencing persistent headaches since October, describing them as a sensation of brain swelling and tenderness under the skull. The pain is constant, and she reports some blurred vision. She also experiences light sensitivity. She has been having difficulty with her CPAP machine, attributing it to night sweats causing the mask seal to break, resulting in poor sleep quality. She believes this may be contributing to her cardiac issues detected on recent imaging. She has been having consistent wheezing for the past year. She has an appointment with Dr. Prieto tomorrow. She wore a Holter monitor for a week, but no abnormalities were detected. She reports experiencing fluttering sensations and chest pressure. An echocardiogram was performed approximately 2 years ago. Recent imaging showed cardiomegaly and pulmonary hypertension.     She has been experiencing hair loss and general malaise. She received a notification from [facility] regarding a recall of her levothyroxine 75 mcg medication in December. She has not had her thyroid labs managed by any healthcare professional recently. She has an upcoming appointment with Dr. Gutierrez in April 2024. She is no longer seeing her prior Endo.     She has been experiencing rib pain, which prompted a CT scan revealing a T6 right transverse process fracture. She has an appointment with a spine specialist on 01/22/2024. Her last DEXA scan was conducted in 2022, indicating osteopenia with a high FRAX score. She has discontinued seeing her gynecologist due to perceived lack of attention to her concerns.    She has a history of prediabetes, with her fasting glucose

## 2025-01-13 NOTE — PROGRESS NOTES
Health Decision Maker has been checked with the patient   Primary Decision Maker: Leena Lewis - [facility] - [phone]     AI form was signed    Chief Complaint   Patient presents with    Sinus Problem     Patients eyes are itchy, sinuses    Fatigue    Trauma     Spine fracture    Blood Sugar Problem     Patient is concerned about glucose       \"Have you been to the ER, urgent care clinic since your last visit?  Hospitalized since your last visit?\"    YES - When: approximately 1 months ago.  Where and Why: [facility] ER for fracture.    “Have you seen or consulted any other health care providers outside of [facility] since your last visit?”    NO      Vitals:    01/13/25 1447   Weight: 110.9 kg (244 lb 9.6 oz)      Depression: Not at risk (1/13/2025)    PHQ-2     PHQ-2 Score: 0              Click Here for Release of Records Request    Chart reviewed: immunizations are documented.     There is no immunization history on file for this patient.

## 2025-01-22 DIAGNOSIS — R79.89 ELEVATED FERRITIN: ICD-10-CM

## 2025-01-22 DIAGNOSIS — R51.9 TEMPORAL HEADACHE: ICD-10-CM

## 2025-01-23 LAB
CRP SERPL-MCNC: 0.32 MG/DL (ref 0–0.3)
ERYTHROCYTE [SEDIMENTATION RATE] IN BLOOD: 17 MM/HR (ref 0–30)

## 2025-01-24 ENCOUNTER — TRANSCRIBE ORDERS (OUTPATIENT)
Facility: HOSPITAL | Age: 75
End: 2025-01-24

## 2025-01-24 DIAGNOSIS — M54.6 PAIN IN THORACIC SPINE AT MULTIPLE SITES: Primary | ICD-10-CM

## 2025-01-27 ENCOUNTER — HOSPITAL ENCOUNTER (OUTPATIENT)
Age: 75
Discharge: HOME | End: 2025-01-30
Payer: MEDICARE

## 2025-01-27 DIAGNOSIS — M54.6 PAIN IN THORACIC SPINE AT MULTIPLE SITES: ICD-10-CM

## 2025-01-27 PROCEDURE — 72146 MRI CHEST SPINE W/O DYE: CPT

## 2025-01-31 LAB
HFE GENE MUT ANL BLD/T: NORMAL
REVIEWED BY: NORMAL

## 2025-02-12 DIAGNOSIS — M17.12 PRIMARY OSTEOARTHRITIS OF LEFT KNEE: ICD-10-CM

## 2025-02-12 DIAGNOSIS — M17.11 PRIMARY OSTEOARTHRITIS OF RIGHT KNEE: Primary | ICD-10-CM

## 2025-02-28 ENCOUNTER — HOSPITAL ENCOUNTER (OUTPATIENT)
Facility: HOSPITAL | Age: 75
Discharge: HOME | End: 2025-02-28
Payer: MEDICARE

## 2025-02-28 DIAGNOSIS — Z78.0 POST-MENOPAUSAL: ICD-10-CM

## 2025-02-28 PROCEDURE — 77080 DXA BONE DENSITY AXIAL: CPT

## 2025-03-05 ENCOUNTER — OFFICE VISIT (OUTPATIENT)
Dept: PRIMARY CARE CLINIC | Facility: CLINIC | Age: 75
End: 2025-03-05

## 2025-03-05 DIAGNOSIS — R41.840 INATTENTION: ICD-10-CM

## 2025-03-05 DIAGNOSIS — F41.9 ANXIETY: Primary | ICD-10-CM

## 2025-03-05 DIAGNOSIS — M85.80 OSTEOPENIA, UNSPECIFIED LOCATION: ICD-10-CM

## 2025-03-05 RX ORDER — ATOMOXETINE 10 MG/1
10 CAPSULE ORAL DAILY
Qty: 30 CAPSULE | Refills: 3 | Status: SHIPPED | OUTPATIENT
Start: 2025-03-05

## 2025-03-05 NOTE — PROGRESS NOTES
Health Decision Maker has been checked with the patient   Primary Decision Maker: Leena Lewis - [facility] - [phone]     AI form was signed    Chief Complaint   Patient presents with    Back Pain     Patient wants to discuss back pain     ADHD     Patient reports ADHD symptoms.        \"Have you been to the ER, urgent care clinic since your last visit?  Hospitalized since your last visit?\"    NO    “Have you seen or consulted any other health care providers outside of [facility] since your last visit?”    NO      There were no vitals filed for this visit.   Depression: Not at risk (1/13/2025)    PHQ-2     PHQ-2 Score: 0              Click Here for Release of Records Request    Chart reviewed: immunizations are documented.     There is no immunization history on file for this patient.   
discussed the diagnosis with the patient and the intended plan as seen in the above orders. The patient has received an after-visit summary and questions were answered concerning future plans.  I have discussed medication side effects and warnings with the patient as well.    The patient (or guardian, if applicable) and other individuals in attendance with the patient were advised that Artificial Intelligence will be utilized during this visit to record and process the conversation to generate a clinical note. The patient (or guardian, if applicable) and other individuals in attendance at the appointment consented to the use of AI, including the recording.       Penny Thompson DO

## 2025-03-10 ENCOUNTER — TELEPHONE (OUTPATIENT)
Dept: PRIMARY CARE CLINIC | Facility: CLINIC | Age: 75
End: 2025-03-10

## 2025-03-10 NOTE — TELEPHONE ENCOUNTER
Patient called to let Dr Thompson know that [facility] just notified her that her prescription for Atomoxetine 10mg Capsule was just ready for  today when Dr Thompson sent the rx on 3/5/25.  When she had the last visit with Dr Thompson on 3/5 Dr Thompson told her to come back to see her in two weeks after taking the new medication.  It's only going to be a week now and she has an appt scheduled for 3/17 to see Dr Thompson.  Did Dr Thompson want her to keep that appt or change it to the following week to make it two weeks but I didn't see any availability.  Patient would like a call back at #[phone]

## 2025-03-17 ENCOUNTER — ANESTHESIA EVENT (OUTPATIENT)
Age: 75
End: 2025-03-17
Payer: MEDICARE

## 2025-03-17 RX ORDER — SODIUM CHLORIDE 0.9 % (FLUSH) 0.9 %
5-40 SYRINGE (ML) INJECTION EVERY 12 HOURS SCHEDULED
Status: CANCELLED | OUTPATIENT
Start: 2025-03-17

## 2025-03-17 RX ORDER — SODIUM CHLORIDE 0.9 % (FLUSH) 0.9 %
5-40 SYRINGE (ML) INJECTION PRN
Status: CANCELLED | OUTPATIENT
Start: 2025-03-17

## 2025-03-18 ENCOUNTER — TELEPHONE (OUTPATIENT)
Dept: PRIMARY CARE CLINIC | Facility: CLINIC | Age: 75
End: 2025-03-18

## 2025-03-18 ENCOUNTER — TELEPHONE (OUTPATIENT)
Age: 75
End: 2025-03-18

## 2025-03-18 DIAGNOSIS — M17.11 PRIMARY OSTEOARTHRITIS OF RIGHT KNEE: Primary | ICD-10-CM

## 2025-03-18 NOTE — TELEPHONE ENCOUNTER
Dr Ray office is calling, requesting copies of notes and pre op form from the patients March 5th pre op visit with Dr Thompson.  They need jaylyn of chest xray, office notes, and EKG.      Is she cleared for her knee surgery on March 31st?      Please fax to [phone]

## 2025-03-18 NOTE — TELEPHONE ENCOUNTER
Called Dr. Ray office X2 no answer could not leave vm.     Called patient and spoke to her. Let her know Dr. Ray office was calling regarding pre op clearance, however at patients last appt a pre op visit was not completed as patient advised rooming assistant and provider that she did not want to complete the pre op at that time, and that she did not know if she wanted to proceed with the surgery. Patient stated she remembers that but thought since she had a visit we could just send the notes. She did speak to another provider about a different surgery but has since decided she wanted to continue to proceed with Dr. Ramírez. I advised her that a pre op appointment would be needed, however we did not have any availability before her surgery date. I asked if she could call to see if there was another date they could do her surgery? She said they cannot do the surgery until September if she pushes it back. I did apologize and say she may need to push surgery back due to not having availability. I advised she could try to call the office periodically to check for last minute cancellations however I could not guarantee someone would fall off. She said she would try calling around 9-9:30am

## 2025-03-18 NOTE — TELEPHONE ENCOUNTER
03/31/25 RT TKR POSS SEMI CONSTRAINED IN PATIENT    She rescheduled her cardio clearance appt for 04/02/25 .... Her surgery will need to be r/s

## 2025-03-31 ENCOUNTER — ANESTHESIA (OUTPATIENT)
Age: 75
End: 2025-03-31
Payer: MEDICARE

## 2025-04-02 SDOH — ECONOMIC STABILITY: FOOD INSECURITY: WITHIN THE PAST 12 MONTHS, YOU WORRIED THAT YOUR FOOD WOULD RUN OUT BEFORE YOU GOT MONEY TO BUY MORE.: NEVER TRUE

## 2025-04-02 SDOH — ECONOMIC STABILITY: FOOD INSECURITY: WITHIN THE PAST 12 MONTHS, THE FOOD YOU BOUGHT JUST DIDN'T LAST AND YOU DIDN'T HAVE MONEY TO GET MORE.: NEVER TRUE

## 2025-04-02 SDOH — ECONOMIC STABILITY: TRANSPORTATION INSECURITY
IN THE PAST 12 MONTHS, HAS LACK OF TRANSPORTATION KEPT YOU FROM MEETINGS, WORK, OR FROM GETTING THINGS NEEDED FOR DAILY LIVING?: NO

## 2025-04-03 ENCOUNTER — TELEMEDICINE (OUTPATIENT)
Dept: PRIMARY CARE CLINIC | Facility: CLINIC | Age: 75
End: 2025-04-03

## 2025-04-03 ENCOUNTER — TELEPHONE (OUTPATIENT)
Dept: PRIMARY CARE CLINIC | Facility: CLINIC | Age: 75
End: 2025-04-03

## 2025-04-03 DIAGNOSIS — F41.9 ANXIETY: Primary | ICD-10-CM

## 2025-04-03 DIAGNOSIS — R41.840 INATTENTION: ICD-10-CM

## 2025-04-03 RX ORDER — ATOMOXETINE 18 MG/1
18 CAPSULE ORAL DAILY
Qty: 90 CAPSULE | Refills: 0 | Status: SHIPPED | OUTPATIENT
Start: 2025-04-03

## 2025-04-03 SDOH — ECONOMIC STABILITY: FOOD INSECURITY: WITHIN THE PAST 12 MONTHS, THE FOOD YOU BOUGHT JUST DIDN'T LAST AND YOU DIDN'T HAVE MONEY TO GET MORE.: NEVER TRUE

## 2025-04-03 SDOH — ECONOMIC STABILITY: FOOD INSECURITY: WITHIN THE PAST 12 MONTHS, YOU WORRIED THAT YOUR FOOD WOULD RUN OUT BEFORE YOU GOT MONEY TO BUY MORE.: NEVER TRUE

## 2025-04-03 SDOH — ECONOMIC STABILITY: INCOME INSECURITY: IN THE LAST 12 MONTHS, WAS THERE A TIME WHEN YOU WERE NOT ABLE TO PAY THE MORTGAGE OR RENT ON TIME?: YES

## 2025-04-03 SDOH — ECONOMIC STABILITY: TRANSPORTATION INSECURITY
IN THE PAST 12 MONTHS, HAS THE LACK OF TRANSPORTATION KEPT YOU FROM MEDICAL APPOINTMENTS OR FROM GETTING MEDICATIONS?: NO

## 2025-04-03 NOTE — PROGRESS NOTES
Ana Prather (:  1950) is a Established patient, here for evaluation of the following:  Follow-up      Assessment & Plan   Below is the assessment and plan developed based on review of pertinent history, physical exam, labs, studies, and medications.  1. Anxiety  -     atomoxetine (STRATTERA) 18 MG capsule; Take 1 capsule by mouth daily, Disp-90 capsule, R-0Normal  2. Inattention  -     atomoxetine (STRATTERA) 18 MG capsule; Take 1 capsule by mouth daily, Disp-90 capsule, R-0Normal    Will increase dose to 18mg daily. Advised to follow up in 3-4 weeks virtually to see how she is doing on increased dose. I felt visit was more focused today then in past.          Subjective   HPI    Has been on Strattera for about 21 days. States things have been hectic with her upcoming surgery. She states she does think it helps some. She states she gets tired by the afternoon. Denies thoughts of self harm.         Patient Active Problem List   Diagnosis    Multinodular goiter    Hypothyroidism (acquired)    Skin cancer    Obesity, morbid    Obesity, unspecified    Impaired glucose tolerance    Chronic fatigue syndrome    Cervical radiculopathy due to degenerative joint disease of spine    Supraventricular tachycardia, paroxysmal    Carpal tunnel syndrome, bilateral upper limbs    Palpitations    Iatrogenic hyperthyroidism    Anxiety disorder    Fibromyalgia    Chest pain, unspecified    Osteoarthritis of right knee    Osteoarthritis of left knee        Current Outpatient Medications on File Prior to Visit   Medication Sig Dispense Refill    calcium carbonate (TUMS) 500 MG chewable tablet Take 1 tablet by mouth daily      thyroid (ARMOUR) 60 MG tablet Take 1 tablet by mouth daily      TIROSINT 75 MCG CAPS TAKE 1 TABLET BY MOUTH IN THE MORNING WITH WATER AND WAIT 30 TO 60 MINUTES BEFORE OTHER DRINK, FOOD, OR MEDS. TAKE CALCIUM, IRON, AND MULTI-VITAMINS 4 HOURS APART 30 capsule 0    UNABLE TO FIND CBD cream   Knee pain

## 2025-04-03 NOTE — TELEPHONE ENCOUNTER
Spoke with patient. She stated that Dr Walton' office said she does not need a pre op clearance from PCP for surgery. They said she has already done the steps for a pre op. I asked the patient for their office number so I could get confirmation of this. Patient provided a phone number to the [place] office [phone]. I called the office and left a voicemail for his Medical Assistant to call back and discuss.

## 2025-04-14 DIAGNOSIS — M17.12 PRIMARY OSTEOARTHRITIS OF LEFT KNEE: ICD-10-CM

## 2025-04-14 DIAGNOSIS — M17.11 PRIMARY OSTEOARTHRITIS OF RIGHT KNEE: Primary | ICD-10-CM

## 2025-05-14 DIAGNOSIS — M17.11 OSTEOARTHRITIS OF RIGHT KNEE, UNSPECIFIED OSTEOARTHRITIS TYPE: Primary | ICD-10-CM

## 2025-05-22 RX ORDER — ATOMOXETINE 10 MG/1
10 CAPSULE ORAL DAILY
Qty: 30 CAPSULE | Refills: 3 | Status: SHIPPED | OUTPATIENT
Start: 2025-05-22

## 2025-05-30 ENCOUNTER — OFFICE VISIT (OUTPATIENT)
Dept: PRIMARY CARE CLINIC | Facility: CLINIC | Age: 75
End: 2025-05-30

## 2025-05-30 ENCOUNTER — RESULTS FOLLOW-UP (OUTPATIENT)
Dept: PRIMARY CARE CLINIC | Facility: CLINIC | Age: 75
End: 2025-05-30

## 2025-05-30 VITALS
WEIGHT: 239 LBS | HEART RATE: 68 BPM | SYSTOLIC BLOOD PRESSURE: 111 MMHG | HEIGHT: 64 IN | OXYGEN SATURATION: 99 % | TEMPERATURE: 97.9 F | BODY MASS INDEX: 40.8 KG/M2 | RESPIRATION RATE: 18 BRPM | DIASTOLIC BLOOD PRESSURE: 67 MMHG

## 2025-05-30 DIAGNOSIS — E03.9 HYPOTHYROIDISM, UNSPECIFIED TYPE: ICD-10-CM

## 2025-05-30 DIAGNOSIS — R26.81 UNSTEADY GAIT: ICD-10-CM

## 2025-05-30 DIAGNOSIS — R53.1 GENERALIZED WEAKNESS: ICD-10-CM

## 2025-05-30 DIAGNOSIS — R73.03 PREDIABETES: Primary | ICD-10-CM

## 2025-05-30 DIAGNOSIS — R52 GENERALIZED PAIN: ICD-10-CM

## 2025-05-30 DIAGNOSIS — K76.0 HEPATIC STEATOSIS: ICD-10-CM

## 2025-05-30 LAB — HBA1C MFR BLD: 6.1 %

## 2025-05-30 NOTE — PROGRESS NOTES
Health Decision Maker has been checked with the patient   Primary Decision Maker: Leena Lewis - [facility] - [phone]     AI form was signed    Chief Complaint   Patient presents with    Follow-up     Saw Dr Newsome [facility] May 3rd.  Has not seen hepatology in over 1 year.   Wants to discuss arthritis and body weakness.         \"Have you been to the ER, urgent care clinic since your last visit?  Hospitalized since your last visit?\"    NO    “Have you seen or consulted any other health care providers outside of [facility] since your last visit?”    NO      Vitals:    05/30/25 1331   Resp: 18   TempSrc: Oral   Weight: 108.4 kg (239 lb)   Height: 1.626 m (5' 4\")      Depression: Not at risk (5/2/2025)    Received from [facility]    PHQ-2     Patient Health Questionnaire-2 Score: 0              Click Here for Release of Records Request    Chart reviewed: immunizations are documented.     There is no immunization history on file for this patient.

## 2025-05-30 NOTE — PROGRESS NOTES
HPI     Chief Complaint   Patient presents with    Follow-up     Saw Dr Newsome Endocrin[facility] May 3rd.  Has not seen hepatology in over 1 year.   Wants to discuss arthritis and body weakness.         History of Present Illness  75-year-old female presents for follow-up.    Persistent knee stiffness, scheduled for surgery on 06/30/2025 for knee replacement. Frequent stumbling due to leg weakness, described as feeling like sticks. Attending physical therapy 3 days a week at [facility] for knees and back. Difficulty carrying objects and severe pain at the thigh-leg bone junction. Would be interested in RA testing to rule out other causes of arthritis/ joint pains. Notes multiple alondra of joint pain including R shoulder enedina when laying on that side. Increased appetite recently.    Dull headaches for several months. Notes she could drink more water. No neck tension, thin line of pain at top of head. No vision changes with headaches. She does have dry eyes which she wonders if this contributing. Using 2 different eye drops with improvement. Wakes 2-4 times a night due to CPAP mask malfunction and urination. No excessive fluid intake before bedtime. Hx of astigmatism in both eyes. No gross neuro changes.     Uses CPAP mask for sleep apnea, experiencing air leakage and dryness in one eye. Consulted sleep specialist 6 weeks ago, tried full mask causing excessive sweating. Reports nasal congestion, uses spray and allergy medication.    PAST SURGICAL HISTORY:  - Colonoscopy (date not specified)  - Hysterectomy (date not specified)     Reviewed PmHx, RxHx, FmHx, SocHx, AllgHx and updated and dated in the chart.    Physical Exam:  /67   Pulse 68   Temp 97.9 °F (36.6 °C) (Oral)   Resp 18   Ht 1.626 m (5' 4\")   Wt 108.4 kg (239 lb)   LMP 12/12/2000   SpO2 99%   BMI 41.02 kg/m²     Physical Exam  WNWD NAD  RRR  CTA no wheezes ronchi rales  Uses a rollator at baseline  CN II-XII intact. Strength 5/5 in lower ext BL.

## 2025-06-02 DIAGNOSIS — M17.11 PRIMARY OSTEOARTHRITIS OF RIGHT KNEE: Primary | ICD-10-CM

## 2025-06-12 ENCOUNTER — TELEPHONE (OUTPATIENT)
Age: 75
End: 2025-06-12

## 2025-06-12 NOTE — TELEPHONE ENCOUNTER
Pre op testing:  Pcp- June 18th @ 4th   Cardio was march 5th  Pulm was march 7th     Pt needs explained to her step by step how to get other testing done before her pcp appt.

## 2025-06-13 DIAGNOSIS — M17.11 PRIMARY OSTEOARTHRITIS OF RIGHT KNEE: ICD-10-CM

## 2025-06-13 DIAGNOSIS — Z01.818 PRE-OP TESTING: Primary | ICD-10-CM

## 2025-06-16 DIAGNOSIS — Z96.651 STATUS POST TOTAL RIGHT KNEE REPLACEMENT: Primary | ICD-10-CM

## 2025-06-17 ENCOUNTER — HOSPITAL ENCOUNTER (OUTPATIENT)
Facility: HOSPITAL | Age: 75
Discharge: HOME | End: 2025-06-20
Payer: MEDICARE

## 2025-06-17 DIAGNOSIS — M17.11 PRIMARY OSTEOARTHRITIS OF RIGHT KNEE: ICD-10-CM

## 2025-06-17 DIAGNOSIS — Z01.818 PRE-OP TESTING: ICD-10-CM

## 2025-06-17 LAB
ERYTHROCYTE [DISTWIDTH] IN BLOOD BY AUTOMATED COUNT: 12.2 % (ref 11.5–14.5)
HCT VFR BLD AUTO: 43.1 % (ref 35–47)
HGB BLD-MCNC: 13.2 G/DL (ref 11.5–16)
MCH RBC QN AUTO: 29.7 PG (ref 26–34)
MCHC RBC AUTO-ENTMCNC: 30.6 G/DL (ref 30–36.5)
MCV RBC AUTO: 97.1 FL (ref 80–99)
NRBC # BLD: 0 K/UL (ref 0–0.01)
NRBC BLD-RTO: 0 PER 100 WBC
PLATELET # BLD AUTO: 281 K/UL (ref 150–400)
PMV BLD AUTO: 10.5 FL (ref 8.9–12.9)
RBC # BLD AUTO: 4.44 M/UL (ref 3.8–5.2)
WBC # BLD AUTO: 4.8 K/UL (ref 3.6–11)

## 2025-06-17 PROCEDURE — 71046 X-RAY EXAM CHEST 2 VIEWS: CPT

## 2025-06-17 ASSESSMENT — PROMIS GLOBAL HEALTH SCALE
SUM OF RESPONSES TO QUESTIONS 3, 6, 7, & 8: 12
IN GENERAL, WOULD YOU SAY YOUR QUALITY OF LIFE IS...[ON A SCALE OF 1 (POOR) TO 5 (EXCELLENT)]: FAIR
IN GENERAL, PLEASE RATE HOW WELL YOU CARRY OUT YOUR USUAL SOCIAL ACTIVITIES (INCLUDES ACTIVITIES AT HOME, AT WORK, AND IN YOUR COMMUNITY, AND RESPONSIBILITIES AS A PARENT, CHILD, SPOUSE, EMPLOYEE, FRIEND, ETC) [ON A SCALE OF 1 (POOR) TO 5 (EXCELLENT)]?: FAIR
IN GENERAL, WOULD YOU SAY YOUR HEALTH IS...[ON A SCALE OF 1 (POOR) TO 5 (EXCELLENT)]: FAIR
IN THE PAST 7 DAYS, HOW WOULD YOU RATE YOUR FATIGUE ON AVERAGE [ON A SCALE FROM 1 (NONE) TO 5 (VERY SEVERE)]?: SEVERE
TO WHAT EXTENT ARE YOU ABLE TO CARRY OUT YOUR EVERYDAY PHYSICAL ACTIVITIES SUCH AS WALKING, CLIMBING STAIRS, CARRYING GROCERIES, OR MOVING A CHAIR [ON A SCALE OF 1 (NOT AT ALL) TO 5 (COMPLETELY)]?: NOT AT ALL
HOW IS THE PROMIS V1.1 BEING ADMINISTERED?: TELEPHONE
IN GENERAL, HOW WOULD YOU RATE YOUR MENTAL HEALTH, INCLUDING YOUR MOOD AND YOUR ABILITY TO THINK [ON A SCALE OF 1 (POOR) TO 5 (EXCELLENT)]?: FAIR
IN THE PAST 7 DAYS, HOW OFTEN HAVE YOU BEEN BOTHERED BY EMOTIONAL PROBLEMS, SUCH AS FEELING ANXIOUS, DEPRESSED, OR IRRITABLE [ON A SCALE FROM 1 (NEVER) TO 5 (ALWAYS)]?: OFTEN
IN GENERAL, HOW WOULD YOU RATE YOUR PHYSICAL HEALTH [ON A SCALE OF 1 (POOR) TO 5 (EXCELLENT)]?: FAIR
WHO IS THE PERSON COMPLETING THE PROMIS V1.1 SURVEY?: SELF
IN GENERAL, HOW WOULD YOU RATE YOUR SATISFACTION WITH YOUR SOCIAL ACTIVITIES AND RELATIONSHIPS [ON A SCALE OF 1 (POOR) TO 5 (EXCELLENT)]?: POOR
SUM OF RESPONSES TO QUESTIONS 2, 4, 5, & 10: 7
IN THE PAST 7 DAYS, HOW WOULD YOU RATE YOUR PAIN ON AVERAGE [ON A SCALE FROM 0 (NO PAIN) TO 10 (WORST IMAGINABLE PAIN)]?: 7

## 2025-06-17 ASSESSMENT — KOOS JR
STANDING UPRIGHT: SEVERE
STRAIGHTENING KNEE FULLY: MODERATE
TWISING OR PIVOTING ON KNEE: SEVERE
KOOS JR TOTAL INTERVAL SCORE: 34.174
RISING FROM SITTING: EXTREME
GOING UP OR DOWN STAIRS: SEVERE
HOW SEVERE IS YOUR KNEE STIFFNESS AFTER FIRST WAKING IN MORNING: EXTREME
BENDING TO THE FLOOR TO PICK UP OBJECT: MODERATE

## 2025-06-18 ENCOUNTER — OFFICE VISIT (OUTPATIENT)
Dept: PRIMARY CARE CLINIC | Facility: CLINIC | Age: 75
End: 2025-06-18

## 2025-06-18 VITALS
TEMPERATURE: 97.8 F | HEART RATE: 75 BPM | OXYGEN SATURATION: 99 % | BODY MASS INDEX: 45.58 KG/M2 | DIASTOLIC BLOOD PRESSURE: 75 MMHG | HEIGHT: 64 IN | RESPIRATION RATE: 18 BRPM | WEIGHT: 267 LBS | SYSTOLIC BLOOD PRESSURE: 135 MMHG

## 2025-06-18 DIAGNOSIS — M17.11 OSTEOARTHRITIS OF RIGHT KNEE, UNSPECIFIED OSTEOARTHRITIS TYPE: Primary | ICD-10-CM

## 2025-06-18 DIAGNOSIS — G47.33 OSA ON CPAP: ICD-10-CM

## 2025-06-18 DIAGNOSIS — K76.0 HEPATIC STEATOSIS: ICD-10-CM

## 2025-06-18 DIAGNOSIS — E78.5 HYPERLIPIDEMIA, UNSPECIFIED HYPERLIPIDEMIA TYPE: ICD-10-CM

## 2025-06-18 DIAGNOSIS — E66.01 SEVERE OBESITY (BMI >= 40) (HCC): ICD-10-CM

## 2025-06-18 DIAGNOSIS — E03.9 HYPOTHYROIDISM, UNSPECIFIED TYPE: ICD-10-CM

## 2025-06-18 DIAGNOSIS — Z01.818 PRE-OP EVALUATION: ICD-10-CM

## 2025-06-18 DIAGNOSIS — I47.10 SVT (SUPRAVENTRICULAR TACHYCARDIA): ICD-10-CM

## 2025-06-18 DIAGNOSIS — R41.840 INATTENTION: ICD-10-CM

## 2025-06-18 DIAGNOSIS — F41.9 ANXIETY: ICD-10-CM

## 2025-06-18 DIAGNOSIS — R73.03 PREDIABETES: ICD-10-CM

## 2025-06-18 LAB
ANION GAP SERPL CALC-SCNC: 7 MMOL/L (ref 2–12)
BUN SERPL-MCNC: 22 MG/DL (ref 6–20)
BUN/CREAT SERPL: 31 (ref 12–20)
CALCIUM SERPL-MCNC: 9.4 MG/DL (ref 8.5–10.1)
CHLORIDE SERPL-SCNC: 105 MMOL/L (ref 97–108)
CO2 SERPL-SCNC: 28 MMOL/L (ref 21–32)
CREAT SERPL-MCNC: 0.7 MG/DL (ref 0.55–1.02)
GLUCOSE SERPL-MCNC: 109 MG/DL (ref 65–100)
POTASSIUM SERPL-SCNC: 4.6 MMOL/L (ref 3.5–5.1)
SODIUM SERPL-SCNC: 140 MMOL/L (ref 136–145)

## 2025-06-18 RX ORDER — SODIUM CHLORIDE 0.9 % (FLUSH) 0.9 %
5-40 SYRINGE (ML) INJECTION EVERY 12 HOURS SCHEDULED
Status: CANCELLED | OUTPATIENT
Start: 2025-06-18

## 2025-06-18 RX ORDER — CLINDAMYCIN PHOSPHATE 900 MG/50ML
900 INJECTION, SOLUTION INTRAVENOUS EVERY 8 HOURS
Status: CANCELLED | OUTPATIENT
Start: 2025-06-18

## 2025-06-18 NOTE — PROGRESS NOTES
Preoperative Evaluation    Date of Exam: 2025    Ana Prather is a 75 y.o. female (:1950) who presents for preoperative evaluation.     Procedure/Surgery: R TKA    Date of Procedure/Surgery: 25    Surgeon: Dr. Lexa Ramírez    [facility]    Primary Physician: Erika Thompson DO    Latex Allergy: yes    Recent use of: No recent use of aspirin (ASA), NSAIDS or steroids     Tetanus up to date: last tetanus booster within 10 years    Anesthesia Complications: Yes: Personal Hx states she has a hard time waking up from anesthesia      History of abnormal bleeding : None     History of Blood Transfusions: no     Health Care Directive or Living Will: not on file     Is able to climb a flight of stairs without chest pain or severe SOB    Allergies- reviewed:   Allergies   Allergen Reactions    Latex Other (See Comments) and Rash     Reaction Type: Allergy; Reaction(s): Rash, NOS,Swelling  Other reaction(s): Unknown      Cephalexin Anxiety, Diarrhea, Other (See Comments), Headaches and Nausea And Vomiting     Other reaction(s): GI intolerance  Other reaction(s): Diarrhea, Unknown    Ciprofloxacin Shortness Of Breath, Diarrhea, Nausea Only, Anxiety, Other (See Comments), Headaches and Myalgia       Cannot tolerate  Other reaction(s): Abdominal Pain, Abdominal Pain, Joint Pain, Lightheadedness    Gadolinium Derivatives Nausea And Vomiting, Palpitations, Other (See Comments), Headaches and Dizziness or Vertigo     MRI Contrast - shaking and trembling              Ibuprofen Hives           Iodinated Contrast Media Hives, Shortness Of Breath, Swelling and Other (See Comments)     Gets dizzy and shaky      Lidocaine Photosensitivity, Palpitations, Other (See Comments), Headaches and Dizziness or Vertigo     After breast biopsy, had a issue that she was advised was likely due to the Lidocaine.   Patient states causes shaking         Sulfa Antibiotics Diarrhea,

## 2025-06-18 NOTE — PROGRESS NOTES
Health Decision Maker has been checked with the patient   Primary Decision Maker: Leena Lewis - [facility] - [phone]     AI form was signed    Chief Complaint   Patient presents with    Pre-op Exam       \"Have you been to the ER, urgent care clinic since your last visit?  Hospitalized since your last visit?\"    NO    “Have you seen or consulted any other health care providers outside of [facility] since your last visit?”    NO      There were no vitals filed for this visit.   Depression: Not at risk (5/2/2025)    Received from [facility]    PHQ-2     Patient Health Questionnaire-2 Score: 0              Click Here for Release of Records Request    Chart reviewed: immunizations are documented.     There is no immunization history on file for this patient.

## 2025-06-19 ENCOUNTER — TELEPHONE (OUTPATIENT)
Dept: PRIMARY CARE CLINIC | Facility: CLINIC | Age: 75
End: 2025-06-19

## 2025-06-19 NOTE — TELEPHONE ENCOUNTER
Patient says her weight is wrong on her chart and she would like to talk to a nurse about fixing it.

## 2025-06-20 RX ORDER — CLINDAMYCIN PHOSPHATE 900 MG/50ML
900 INJECTION, SOLUTION INTRAVENOUS EVERY 8 HOURS
Status: CANCELLED | OUTPATIENT
Start: 2025-06-20

## 2025-06-20 RX ORDER — SENNOSIDES 8.6 MG
325 CAPSULE ORAL 2 TIMES DAILY
Status: CANCELLED | OUTPATIENT
Start: 2025-06-30

## 2025-06-20 NOTE — DISCHARGE INSTRUCTIONS
TOTAL KNEE REPLACEMENT DISCHARGE INFORMATION  Read ENTIRE instructions carefully:    You have undergone a Total Knee Replacement. The following list is to provide you with some expectations over the next week upon your discharge from the hospital.     You will be provided a blood thinner after surgery for blood clot prevention. Please take your blood thinner (Aspirin 325mg every 12 hours) as instructed starting 24 hours after surgery. If you are not sure which blood thinner to take, please contact the office next business day for clarification.    2. Start your antibiotics as soon as you get home. Please complete the entire prescription of antibiotics.    3. If your bandage feels too tight in the first 48 hours, you may roll the white compression stocking down, loosen the brown ACE bandage, then pull the white compression stocking back up. After 48 hours you may remove the cotton like material under the ace bandage keeping your incision covered by the current gauze pads in place then re wrap the area with the ace bandage. Continue with the gauze pads and ace bandage over incision until instructed to discontinue them. If there are further concerns or questions about the bandage, please call the office for instruction.    4. Please be sure to continue your thigh-high compression stockings until instructed to discontinue them. You may take the stockings off on the non-operative leg 48 hours after surgery.    5.   DO NOT GET THE INCISION WET until instructed to do so. Please make sure the stockings on the operative leg are pulled up all the way to the thigh to prevent any creases which may result in abrasions or creases in the skin. If the stockings are creating creases resulting in abrasions or blistering on the operative leg, please remove the stockings.     6.   If you had a nerve block and you are not having pain the day of the surgery, at nighttime it is recommended to take 1 tablet of your narcotic pain

## 2025-06-23 ENCOUNTER — TELEMEDICINE (OUTPATIENT)
Age: 75
End: 2025-06-23
Payer: MEDICARE

## 2025-06-23 DIAGNOSIS — E03.9 HYPOTHYROIDISM, UNSPECIFIED TYPE: ICD-10-CM

## 2025-06-23 DIAGNOSIS — Z96.651 STATUS POST TOTAL RIGHT KNEE REPLACEMENT: Primary | ICD-10-CM

## 2025-06-23 DIAGNOSIS — M17.11 PRIMARY OSTEOARTHRITIS OF RIGHT KNEE: Primary | ICD-10-CM

## 2025-06-23 PROCEDURE — G8417 CALC BMI ABV UP PARAM F/U: HCPCS | Performed by: ORTHOPAEDIC SURGERY

## 2025-06-23 PROCEDURE — G8399 PT W/DXA RESULTS DOCUMENT: HCPCS | Performed by: ORTHOPAEDIC SURGERY

## 2025-06-23 PROCEDURE — 1123F ACP DISCUSS/DSCN MKR DOCD: CPT | Performed by: ORTHOPAEDIC SURGERY

## 2025-06-23 PROCEDURE — G8427 DOCREV CUR MEDS BY ELIG CLIN: HCPCS | Performed by: ORTHOPAEDIC SURGERY

## 2025-06-23 PROCEDURE — 99214 OFFICE O/P EST MOD 30 MIN: CPT | Performed by: ORTHOPAEDIC SURGERY

## 2025-06-23 PROCEDURE — 1090F PRES/ABSN URINE INCON ASSESS: CPT | Performed by: ORTHOPAEDIC SURGERY

## 2025-06-23 PROCEDURE — 1036F TOBACCO NON-USER: CPT | Performed by: ORTHOPAEDIC SURGERY

## 2025-06-23 RX ORDER — ASPIRIN 81 MG/1
81 TABLET ORAL 2 TIMES DAILY
Qty: 60 TABLET | Refills: 0 | Status: SHIPPED | OUTPATIENT
Start: 2025-06-23

## 2025-06-23 RX ORDER — CLINDAMYCIN HYDROCHLORIDE 300 MG/1
300 CAPSULE ORAL EVERY 8 HOURS
Qty: 21 CAPSULE | Refills: 0 | Status: SHIPPED | OUTPATIENT
Start: 2025-06-23 | End: 2025-06-30

## 2025-06-23 RX ORDER — ONDANSETRON 8 MG/1
8 TABLET, ORALLY DISINTEGRATING ORAL EVERY 8 HOURS PRN
Qty: 20 TABLET | Refills: 0 | Status: SHIPPED | OUTPATIENT
Start: 2025-06-23

## 2025-06-23 RX ORDER — OXYCODONE AND ACETAMINOPHEN 5; 325 MG/1; MG/1
1 TABLET ORAL
Qty: 30 TABLET | Refills: 0 | Status: SHIPPED | OUTPATIENT
Start: 2025-06-23 | End: 2025-06-30

## 2025-06-23 NOTE — PROGRESS NOTES
Name: Ana Prather (:  1950) is a Established patient, presenting virtually for evaluation of the following:      [facility] ORTHOPAEDICS AND [facility]ORTS MEDICINE  [address]  [place] [address]  Dept: [phone]  Dept Fax: [phone]   Chief Complaint   Patient presents with    Pre-op Exam    Knee Pain     LMP 2000    Allergies   Allergen Reactions    Latex Other (See Comments) and Rash     Reaction Type: Allergy; Reaction(s): Rash, NOS,Swelling  Other reaction(s): Unknown      Cephalexin Anxiety, Diarrhea, Other (See Comments), Headaches and Nausea And Vomiting     Other reaction(s): GI intolerance  Other reaction(s): Diarrhea, Unknown    Ciprofloxacin Shortness Of Breath, Diarrhea, Nausea Only, Anxiety, Other (See Comments), Headaches and Myalgia       Cannot tolerate  Other reaction(s): Abdominal Pain, Abdominal Pain, Joint Pain, Lightheadedness    Gadolinium Derivatives Nausea And Vomiting, Palpitations, Other (See Comments), Headaches and Dizziness or Vertigo     MRI Contrast - shaking and trembling              Ibuprofen Hives           Iodinated Contrast Media Hives, Shortness Of Breath, Swelling and Other (See Comments)     Gets dizzy and shaky      Lidocaine Photosensitivity, Palpitations, Other (See Comments), Headaches and Dizziness or Vertigo     After breast biopsy, had a issue that she was advised was likely due to the Lidocaine.   Patient states causes shaking         Sulfa Antibiotics Diarrhea, Hives, Rash and Swelling     Other reaction(s): Unable to Obtain  \"brain fog\", aching  MRI Contrast      Wheat Other (See Comments)     Other reaction(s): GI intolerance    Adhesive Tape Swelling    Dulaglutide Nausea Only     Stomach discomfort  AND GEDOBENATE  Other reaction(s): GI intolerance  Pt does not recall reaction  Stomach discomfort      Milk-Related Compounds Diarrhea, Dizziness or Vertigo, Headaches and Other

## 2025-06-23 NOTE — H&P (VIEW-ONLY)
Name: Ana Prather (:  1950) is a Established patient, presenting virtually for evaluation of the following:      [facility] ORTHOPAEDICS AND SPORTS MEDICINE  [address]  [place] [address]  Dept: [phone]  Dept Fax: [phone]   Chief Complaint   Patient presents with    Pre-op Exam    Knee Pain     LMP 2000    Allergies   Allergen Reactions    Latex Other (See Comments) and Rash     Reaction Type: Allergy; Reaction(s): Rash, NOS,Swelling  Other reaction(s): Unknown      Cephalexin Anxiety, Diarrhea, Other (See Comments), Headaches and Nausea And Vomiting     Other reaction(s): GI intolerance  Other reaction(s): Diarrhea, Unknown    Ciprofloxacin Shortness Of Breath, Diarrhea, Nausea Only, Anxiety, Other (See Comments), Headaches and Myalgia       Cannot tolerate  Other reaction(s): Abdominal Pain, Abdominal Pain, Joint Pain, Lightheadedness    Gadolinium Derivatives Nausea And Vomiting, Palpitations, Other (See Comments), Headaches and Dizziness or Vertigo     MRI Contrast - shaking and trembling              Ibuprofen Hives           Iodinated Contrast Media Hives, Shortness Of Breath, Swelling and Other (See Comments)     Gets dizzy and shaky      Lidocaine Photosensitivity, Palpitations, Other (See Comments), Headaches and Dizziness or Vertigo     After breast biopsy, had a issue that she was advised was likely due to the Lidocaine.   Patient states causes shaking         Sulfa Antibiotics Diarrhea, Hives, Rash and Swelling     Other reaction(s): Unable to Obtain  \"brain fog\", aching  MRI Contrast      Wheat Other (See Comments)     Other reaction(s): GI intolerance    Adhesive Tape Swelling    Dulaglutide Nausea Only     Stomach discomfort  AND GEDOBENATE  Other reaction(s): GI intolerance  Pt does not recall reaction  Stomach discomfort      Milk-Related Compounds Diarrhea, Dizziness or Vertigo, Headaches and Other

## 2025-06-24 LAB
CARP (RCYP C) 1 IGE QN: <20 UNITS
CCP IGA+IGG SERPL IA-ACNC: <20 UNITS
CEP-1 AB SER IA-ACNC: <20 UNITS
INTERPRETATION: NORMAL
MUT CIT VIMENTIN AB SER-ACNC: <20 U/ML
MUT CIT VIMENTIN AB SER-ACNC: <20 UNITS
REFLEX INFORMATION: NORMAL
RHEUMATOID FACT SERPL-ACNC: <14 IU/ML

## 2025-06-28 ENCOUNTER — PATIENT MESSAGE (OUTPATIENT)
Age: 75
End: 2025-06-28

## 2025-06-30 ENCOUNTER — HOSPITAL ENCOUNTER (OUTPATIENT)
Age: 75
Setting detail: OBSERVATION
Discharge: HOME | End: 2025-07-01
Attending: ORTHOPAEDIC SURGERY | Admitting: INTERNAL MEDICINE
Payer: MEDICARE

## 2025-06-30 ENCOUNTER — APPOINTMENT (OUTPATIENT)
Age: 75
End: 2025-06-30
Attending: ORTHOPAEDIC SURGERY
Payer: MEDICARE

## 2025-06-30 PROBLEM — Z96.651 S/P TOTAL KNEE ARTHROPLASTY, RIGHT: Status: ACTIVE | Noted: 2025-06-30

## 2025-06-30 PROCEDURE — 6360000002 HC RX W HCPCS: Performed by: ORTHOPAEDIC SURGERY

## 2025-06-30 PROCEDURE — 2500000003 HC RX 250 WO HCPCS: Performed by: NURSE ANESTHETIST, CERTIFIED REGISTERED

## 2025-06-30 PROCEDURE — 6370000000 HC RX 637 (ALT 250 FOR IP): Performed by: NURSE ANESTHETIST, CERTIFIED REGISTERED

## 2025-06-30 PROCEDURE — 6360000002 HC RX W HCPCS

## 2025-06-30 PROCEDURE — 2709999900 HC NON-CHARGEABLE SUPPLY: Performed by: ORTHOPAEDIC SURGERY

## 2025-06-30 PROCEDURE — 97530 THERAPEUTIC ACTIVITIES: CPT

## 2025-06-30 PROCEDURE — 3600000015 HC SURGERY LEVEL 5 ADDTL 15MIN: Performed by: ORTHOPAEDIC SURGERY

## 2025-06-30 PROCEDURE — 97161 PT EVAL LOW COMPLEX 20 MIN: CPT

## 2025-06-30 PROCEDURE — 94761 N-INVAS EAR/PLS OXIMETRY MLT: CPT

## 2025-06-30 PROCEDURE — 2500000003 HC RX 250 WO HCPCS

## 2025-06-30 PROCEDURE — 3700000001 HC ADD 15 MINUTES (ANESTHESIA): Performed by: ORTHOPAEDIC SURGERY

## 2025-06-30 PROCEDURE — 2580000003 HC RX 258: Performed by: ORTHOPAEDIC SURGERY

## 2025-06-30 PROCEDURE — 73560 X-RAY EXAM OF KNEE 1 OR 2: CPT

## 2025-06-30 PROCEDURE — C1776 JOINT DEVICE (IMPLANTABLE): HCPCS | Performed by: ORTHOPAEDIC SURGERY

## 2025-06-30 PROCEDURE — C1713 ANCHOR/SCREW BN/BN,TIS/BN: HCPCS | Performed by: ORTHOPAEDIC SURGERY

## 2025-06-30 PROCEDURE — 2580000003 HC RX 258

## 2025-06-30 PROCEDURE — 3700000000 HC ANESTHESIA ATTENDED CARE: Performed by: ORTHOPAEDIC SURGERY

## 2025-06-30 PROCEDURE — 6370000000 HC RX 637 (ALT 250 FOR IP)

## 2025-06-30 PROCEDURE — 7100000001 HC PACU RECOVERY - ADDTL 15 MIN: Performed by: ORTHOPAEDIC SURGERY

## 2025-06-30 PROCEDURE — 6360000002 HC RX W HCPCS: Performed by: NURSE ANESTHETIST, CERTIFIED REGISTERED

## 2025-06-30 PROCEDURE — 3600000005 HC SURGERY LEVEL 5 BASE: Performed by: ORTHOPAEDIC SURGERY

## 2025-06-30 PROCEDURE — G0378 HOSPITAL OBSERVATION PER HR: HCPCS

## 2025-06-30 PROCEDURE — 7100000000 HC PACU RECOVERY - FIRST 15 MIN: Performed by: ORTHOPAEDIC SURGERY

## 2025-06-30 PROCEDURE — 64447 NJX AA&/STRD FEMORAL NRV IMG: CPT | Performed by: NURSE ANESTHETIST, CERTIFIED REGISTERED

## 2025-06-30 DEVICE — CEMENT BONE 40 GM W/ GENTMYCN HI VISC PALACOS R+G: Type: IMPLANTABLE DEVICE | Site: KNEE | Status: FUNCTIONAL

## 2025-06-30 DEVICE — RT SIZE 6 MODULAR FEMORAL
Type: IMPLANTABLE DEVICE | Site: KNEE | Status: FUNCTIONAL
Brand: BALANCE KNEE REVISION SYSTEM

## 2025-06-30 DEVICE — CONSTRAINED TIBIAL INSERT SZ 5  8MM
Type: IMPLANTABLE DEVICE | Site: KNEE | Status: FUNCTIONAL
Brand: BALANCED KNEE REVISION SYSTEM

## 2025-06-30 DEVICE — DIA.14X30MM STEM EXTENSION CEMENTED
Type: IMPLANTABLE DEVICE | Site: KNEE | Status: FUNCTIONAL
Brand: BALANCE KNEE REVISION SYSTEM

## 2025-06-30 DEVICE — SLOPED TIBIAL TRAY SIZE 5
Type: IMPLANTABLE DEVICE | Site: KNEE | Status: FUNCTIONAL
Brand: BALANCED KNEE REVISION SYSTEM

## 2025-06-30 DEVICE — 38MM PATELLA, VITAMIN E
Type: IMPLANTABLE DEVICE | Site: PATELLA | Status: FUNCTIONAL
Brand: BKS E-VITALIZE

## 2025-06-30 DEVICE — JUNCTION BOX 5 DEGREES
Type: IMPLANTABLE DEVICE | Site: KNEE | Status: FUNCTIONAL
Brand: BALANCE KNEE REVISION SYSTEM

## 2025-06-30 RX ORDER — DIPHENHYDRAMINE HCL 25 MG
25 TABLET ORAL EVERY 6 HOURS PRN
Status: DISCONTINUED | OUTPATIENT
Start: 2025-06-30 | End: 2025-07-01 | Stop reason: HOSPADM

## 2025-06-30 RX ORDER — THYROID 60 MG/1
60 TABLET ORAL DAILY
Status: DISCONTINUED | OUTPATIENT
Start: 2025-06-30 | End: 2025-07-01 | Stop reason: HOSPADM

## 2025-06-30 RX ORDER — SODIUM CHLORIDE 0.9 % (FLUSH) 0.9 %
5-40 SYRINGE (ML) INJECTION PRN
Status: DISCONTINUED | OUTPATIENT
Start: 2025-06-30 | End: 2025-07-01 | Stop reason: HOSPADM

## 2025-06-30 RX ORDER — CEPHALEXIN 500 MG/1
500 CAPSULE ORAL EVERY 8 HOURS
Qty: 21 CAPSULE | Refills: 0 | Status: SHIPPED | OUTPATIENT
Start: 2025-06-30 | End: 2025-07-07

## 2025-06-30 RX ORDER — ONDANSETRON 4 MG/1
4 TABLET, ORALLY DISINTEGRATING ORAL EVERY 8 HOURS PRN
Status: DISCONTINUED | OUTPATIENT
Start: 2025-06-30 | End: 2025-07-01 | Stop reason: HOSPADM

## 2025-06-30 RX ORDER — SODIUM CHLORIDE 9 MG/ML
INJECTION, SOLUTION INTRAVENOUS PRN
Status: DISCONTINUED | OUTPATIENT
Start: 2025-06-30 | End: 2025-06-30 | Stop reason: HOSPADM

## 2025-06-30 RX ORDER — LEVOTHYROXINE SODIUM 75 UG/1
75 CAPSULE ORAL DAILY
Status: DISCONTINUED | OUTPATIENT
Start: 2025-07-01 | End: 2025-06-30

## 2025-06-30 RX ORDER — DEXAMETHASONE SODIUM PHOSPHATE 4 MG/ML
INJECTION, SOLUTION INTRA-ARTICULAR; INTRALESIONAL; INTRAMUSCULAR; INTRAVENOUS; SOFT TISSUE
Status: COMPLETED | OUTPATIENT
Start: 2025-06-30 | End: 2025-06-30

## 2025-06-30 RX ORDER — SODIUM CHLORIDE 0.9 % (FLUSH) 0.9 %
5-40 SYRINGE (ML) INJECTION EVERY 12 HOURS SCHEDULED
Status: DISCONTINUED | OUTPATIENT
Start: 2025-06-30 | End: 2025-07-01 | Stop reason: HOSPADM

## 2025-06-30 RX ORDER — SODIUM CHLORIDE 0.9 % (FLUSH) 0.9 %
5-40 SYRINGE (ML) INJECTION PRN
Status: DISCONTINUED | OUTPATIENT
Start: 2025-06-30 | End: 2025-06-30 | Stop reason: HOSPADM

## 2025-06-30 RX ORDER — OXYCODONE HYDROCHLORIDE 10 MG/1
10 TABLET ORAL EVERY 4 HOURS PRN
Status: DISCONTINUED | OUTPATIENT
Start: 2025-06-30 | End: 2025-07-01 | Stop reason: HOSPADM

## 2025-06-30 RX ORDER — SODIUM CHLORIDE 0.9 % (FLUSH) 0.9 %
5-40 SYRINGE (ML) INJECTION EVERY 12 HOURS SCHEDULED
Status: DISCONTINUED | OUTPATIENT
Start: 2025-06-30 | End: 2025-06-30 | Stop reason: HOSPADM

## 2025-06-30 RX ORDER — PROPOFOL 10 MG/ML
INJECTION, EMULSION INTRAVENOUS
Status: DISCONTINUED | OUTPATIENT
Start: 2025-06-30 | End: 2025-06-30 | Stop reason: SDUPTHER

## 2025-06-30 RX ORDER — GABAPENTIN 300 MG/1
300 CAPSULE ORAL ONCE
Status: COMPLETED | OUTPATIENT
Start: 2025-06-30 | End: 2025-06-30

## 2025-06-30 RX ORDER — BUPIVACAINE HYDROCHLORIDE 7.5 MG/ML
INJECTION, SOLUTION EPIDURAL; RETROBULBAR
Status: DISCONTINUED | OUTPATIENT
Start: 2025-06-30 | End: 2025-06-30 | Stop reason: SDUPTHER

## 2025-06-30 RX ORDER — CELECOXIB 200 MG/1
200 CAPSULE ORAL ONCE
Status: DISCONTINUED | OUTPATIENT
Start: 2025-06-30 | End: 2025-06-30

## 2025-06-30 RX ORDER — LEVOTHYROXINE SODIUM 75 UG/1
75 CAPSULE ORAL DAILY
Status: DISCONTINUED | OUTPATIENT
Start: 2025-06-30 | End: 2025-07-01 | Stop reason: HOSPADM

## 2025-06-30 RX ORDER — ACETAMINOPHEN 325 MG/1
650 TABLET ORAL EVERY 6 HOURS
Status: DISCONTINUED | OUTPATIENT
Start: 2025-06-30 | End: 2025-07-01 | Stop reason: HOSPADM

## 2025-06-30 RX ORDER — THYROID 60 MG/1
60 TABLET ORAL DAILY
Status: DISCONTINUED | OUTPATIENT
Start: 2025-07-01 | End: 2025-06-30

## 2025-06-30 RX ORDER — SODIUM CHLORIDE, SODIUM LACTATE, POTASSIUM CHLORIDE, CALCIUM CHLORIDE 600; 310; 30; 20 MG/100ML; MG/100ML; MG/100ML; MG/100ML
INJECTION, SOLUTION INTRAVENOUS CONTINUOUS
Status: DISCONTINUED | OUTPATIENT
Start: 2025-06-30 | End: 2025-06-30 | Stop reason: HOSPADM

## 2025-06-30 RX ORDER — TRANEXAMIC ACID 100 MG/ML
INJECTION, SOLUTION INTRAVENOUS
Status: DISCONTINUED | OUTPATIENT
Start: 2025-06-30 | End: 2025-06-30 | Stop reason: SDUPTHER

## 2025-06-30 RX ORDER — FENTANYL CITRATE 50 UG/ML
25 INJECTION, SOLUTION INTRAMUSCULAR; INTRAVENOUS EVERY 5 MIN PRN
Status: DISCONTINUED | OUTPATIENT
Start: 2025-06-30 | End: 2025-06-30 | Stop reason: HOSPADM

## 2025-06-30 RX ORDER — MIDAZOLAM HYDROCHLORIDE 1 MG/ML
INJECTION, SOLUTION INTRAMUSCULAR; INTRAVENOUS
Status: DISCONTINUED | OUTPATIENT
Start: 2025-06-30 | End: 2025-06-30 | Stop reason: SDUPTHER

## 2025-06-30 RX ORDER — OXYCODONE HYDROCHLORIDE 5 MG/1
5 TABLET ORAL EVERY 4 HOURS PRN
Status: DISCONTINUED | OUTPATIENT
Start: 2025-06-30 | End: 2025-07-01 | Stop reason: HOSPADM

## 2025-06-30 RX ORDER — SODIUM CHLORIDE 9 MG/ML
INJECTION, SOLUTION INTRAVENOUS PRN
Status: CANCELLED | OUTPATIENT
Start: 2025-06-30

## 2025-06-30 RX ORDER — ACETAMINOPHEN 500 MG
1000 TABLET ORAL ONCE
Status: COMPLETED | OUTPATIENT
Start: 2025-06-30 | End: 2025-06-30

## 2025-06-30 RX ORDER — ASPIRIN 81 MG/1
81 TABLET ORAL 2 TIMES DAILY
Status: DISCONTINUED | OUTPATIENT
Start: 2025-07-01 | End: 2025-07-01 | Stop reason: HOSPADM

## 2025-06-30 RX ORDER — BUPIVACAINE HYDROCHLORIDE 2.5 MG/ML
INJECTION, SOLUTION EPIDURAL; INFILTRATION; INTRACAUDAL; PERINEURAL PRN
Status: DISCONTINUED | OUTPATIENT
Start: 2025-06-30 | End: 2025-06-30 | Stop reason: ALTCHOICE

## 2025-06-30 RX ORDER — ONDANSETRON 2 MG/ML
4 INJECTION INTRAMUSCULAR; INTRAVENOUS EVERY 6 HOURS PRN
Status: DISCONTINUED | OUTPATIENT
Start: 2025-06-30 | End: 2025-07-01 | Stop reason: HOSPADM

## 2025-06-30 RX ORDER — NALOXONE HYDROCHLORIDE 0.4 MG/ML
INJECTION, SOLUTION INTRAMUSCULAR; INTRAVENOUS; SUBCUTANEOUS PRN
Status: DISCONTINUED | OUTPATIENT
Start: 2025-06-30 | End: 2025-06-30 | Stop reason: HOSPADM

## 2025-06-30 RX ORDER — BUPIVACAINE HYDROCHLORIDE 5 MG/ML
INJECTION, SOLUTION EPIDURAL; INTRACAUDAL; PERINEURAL
Status: COMPLETED | OUTPATIENT
Start: 2025-06-30 | End: 2025-06-30

## 2025-06-30 RX ORDER — DIPHENHYDRAMINE HYDROCHLORIDE 50 MG/ML
25 INJECTION, SOLUTION INTRAMUSCULAR; INTRAVENOUS EVERY 6 HOURS PRN
Status: DISCONTINUED | OUTPATIENT
Start: 2025-06-30 | End: 2025-07-01 | Stop reason: HOSPADM

## 2025-06-30 RX ADMIN — BUPIVACAINE HYDROCHLORIDE 1.6 ML: 7.5 INJECTION, SOLUTION EPIDURAL; RETROBULBAR at 11:51

## 2025-06-30 RX ADMIN — THYROID 60 MG: 60 TABLET ORAL at 15:48

## 2025-06-30 RX ADMIN — TRANEXAMIC ACID 1000 MG: 1 INJECTION, SOLUTION INTRAVENOUS at 12:01

## 2025-06-30 RX ADMIN — MIDAZOLAM 4 MG: 1 INJECTION INTRAMUSCULAR; INTRAVENOUS at 11:37

## 2025-06-30 RX ADMIN — OXYCODONE HYDROCHLORIDE 10 MG: 10 TABLET ORAL at 20:07

## 2025-06-30 RX ADMIN — BUPIVACAINE HYDROCHLORIDE 20 ML: 5 INJECTION, SOLUTION EPIDURAL; INTRACAUDAL; PERINEURAL at 11:37

## 2025-06-30 RX ADMIN — OXYCODONE HYDROCHLORIDE 5 MG: 5 TABLET ORAL at 15:47

## 2025-06-30 RX ADMIN — DEXAMETHASONE SODIUM PHOSPHATE 4 MG: 4 INJECTION INTRA-ARTICULAR; INTRALESIONAL; INTRAMUSCULAR; INTRAVENOUS; SOFT TISSUE at 11:37

## 2025-06-30 RX ADMIN — SODIUM CHLORIDE, PRESERVATIVE FREE 10 ML: 5 INJECTION INTRAVENOUS at 20:16

## 2025-06-30 RX ADMIN — PROPOFOL 50 MCG/KG/MIN: 10 INJECTION, EMULSION INTRAVENOUS at 11:55

## 2025-06-30 RX ADMIN — TRANEXAMIC ACID 1000 MG: 1 INJECTION, SOLUTION INTRAVENOUS at 12:46

## 2025-06-30 RX ADMIN — ACETAMINOPHEN 650 MG: 325 TABLET ORAL at 15:47

## 2025-06-30 RX ADMIN — ACETAMINOPHEN 1000 MG: 500 TABLET ORAL at 08:41

## 2025-06-30 RX ADMIN — LEVOTHYROXINE SODIUM 75 MCG: 75 CAPSULE ORAL at 15:48

## 2025-06-30 RX ADMIN — SODIUM CHLORIDE, POTASSIUM CHLORIDE, SODIUM LACTATE AND CALCIUM CHLORIDE: 600; 310; 30; 20 INJECTION, SOLUTION INTRAVENOUS at 08:38

## 2025-06-30 RX ADMIN — SODIUM CHLORIDE 1500 MG: 0.9 INJECTION, SOLUTION INTRAVENOUS at 20:11

## 2025-06-30 RX ADMIN — SODIUM CHLORIDE 1500 MG: 0.9 INJECTION, SOLUTION INTRAVENOUS at 08:45

## 2025-06-30 RX ADMIN — GABAPENTIN 300 MG: 300 CAPSULE ORAL at 08:41

## 2025-06-30 ASSESSMENT — PAIN DESCRIPTION - DESCRIPTORS
DESCRIPTORS: SHARP
DESCRIPTORS: ACHING

## 2025-06-30 ASSESSMENT — PAIN - FUNCTIONAL ASSESSMENT
PAIN_FUNCTIONAL_ASSESSMENT: NONE - DENIES PAIN
PAIN_FUNCTIONAL_ASSESSMENT: ACTIVITIES ARE NOT PREVENTED
PAIN_FUNCTIONAL_ASSESSMENT: NONE - DENIES PAIN

## 2025-06-30 ASSESSMENT — PAIN SCALES - GENERAL
PAINLEVEL_OUTOF10: 6
PAINLEVEL_OUTOF10: 3
PAINLEVEL_OUTOF10: 8

## 2025-06-30 ASSESSMENT — PAIN DESCRIPTION - FREQUENCY: FREQUENCY: INTERMITTENT

## 2025-06-30 ASSESSMENT — PAIN DESCRIPTION - PAIN TYPE: TYPE: SURGICAL PAIN

## 2025-06-30 ASSESSMENT — PAIN DESCRIPTION - LOCATION
LOCATION: KNEE
LOCATION: KNEE

## 2025-06-30 ASSESSMENT — PAIN DESCRIPTION - ONSET: ONSET: GRADUAL

## 2025-06-30 ASSESSMENT — PAIN DESCRIPTION - ORIENTATION
ORIENTATION: RIGHT
ORIENTATION: RIGHT

## 2025-06-30 ASSESSMENT — PAIN DESCRIPTION - DIRECTION: RADIATING_TOWARDS: NON RADIATING

## 2025-06-30 NOTE — OP NOTE
Operative Note    Patient: Ana Prather MRN: [id number]  Surgery Date: 6/30/2025        Procedure  Primary Surgeon    RIGHT TOTAL KNEE REPLACEMENT SEMI CONSTRAINED INPATIENT  Leax Ramírez MD    * Panel 2 does not exist *  * Panel 2 does not exist *    * Panel 3 does not exist *  * Panel 3 does not exist *     Surgeons and Role:     * Lexa Ramírez MD - Primary    Other OR Staff/Assistants:  Circulator: Maddi Worrell RN  Surgical Assistant: Seth Mendez; Nelli Fenton  Scrub Person First: Kaur Farrell    1st Assistant Tasks:  Closing    Pre-operative Diagnosis:  Osteoarthritis of right knee [M17.11]    Post-operative Diagnosis: same as preop diagnosis      Anesthesia Type: Spinal     Findings: djd    Complications: No    EBL: 50 cc    Body mass index is 40.75 kg/m².    Specimens: None       Implant Name Type Inv. Item Serial No.  Lot No. LRB No. Used Action   COMPONENT FEM SZ 6 R MOD BAL REV SYS - [id number]  COMPONENT FEM SZ 6 R MOD BAL REV SYS  ORTHO DEVELOPMENT motify-WD [id number] Right 1 Implanted   CEMENT BONE 40 GM W/ GENTMYCN HI VISC PALACOS R+G - [id number]  CEMENT BONE 40 GM W/ GENTMYCN HI VISC PALACOS R+G  Bundle Buy [id number] Right 1 Implanted   CEMENT BONE 40 GM W/ GENTMYCN HI VISC PALACOS R+G - [id number]  CEMENT BONE 40 GM W/ GENTMYCN HI VISC PALACOS R+G  Bundle Buy [id number] Right 1 Implanted   Balanced Knee Revision System     [id number] Right 1 Implanted   INSERT TIB 5 8 MM CONSTRN REV BKS - [id number]  INSERT TIB 5 8 MM CONSTRN REV BKS  ORTHO DEVELOPMENT mot[employer]-WD [id number] Right 1 Implanted   COMPONENT PAT H38MM STD E VITALIZE NATALIA OSIEL RND BAL SYS - [id number]  COMPONENT PAT H38MM STD E VITALIZE NATALIA OSIEL RND BAL SYS  ORTHO DEVELOPMENT motify-WD [id number] Right 1 Implanted   BOX JUNCTION 5DEG FEM BAL REV SYS - [id number]  BOX JUNCTION 5DEG FEM BAL REV SYS  ORTHO DEVELOPMENT RADHA-WD [id number] Right 1 Implanted   EXTENSION STEM L30MM [id number] KNEE NATALIA BAL REV SYS -  No

## 2025-06-30 NOTE — ANESTHESIA POSTPROCEDURE EVALUATION
Department of Anesthesiology  Postprocedure Note    Patient: Ana Prather  MRN: [id number]  YOB: 1950  Date of evaluation: 6/30/2025    Procedure Summary       Date: 06/30/25 Room / Location: [facility] MAIN 01 / [facility] MA[facility] OR    Anesthesia Start: 1143 Anesthesia Stop: 1359    Procedure: RIGHT TOTAL KNEE REPLACEMENT SEMI CONSTRAINED INPATIENT (Right: Knee) Diagnosis:       Osteoarthritis of right knee      (Osteoarthritis of right knee [M17.11])    Surgeons: Lexa Ramírez MD Responsible Provider: Mikala Michelle APRN - CRNA    Anesthesia Type: General, Regional, Spinal, MAC ASA Status: 3            Anesthesia Type: General, Regional, Spinal, MAC    Philippe Phase I:      Philippe Phase II:      Anesthesia Post Evaluation    Patient location during evaluation: bedside  Level of consciousness: awake and alert  Pain score: 0  Airway patency: patent  Nausea & Vomiting: no nausea and no vomiting  Cardiovascular status: blood pressure returned to baseline  Respiratory status: acceptable  Hydration status: euvolemic  Pain management: adequate    No notable events documented.

## 2025-06-30 NOTE — ANESTHESIA PROCEDURE NOTES
Spinal Block    End time: 6/30/2025 11:55 AM  Reason for block: primary anesthetic  Staffing  Performed: resident/CRNA   Resident/CRNA: Mikala Michelle APRN - CRNA  Performed by: Mikala Michelle APRN - CRNA  Authorized by: Mikala Michelle APRN - CRNA    Spinal Block  Patient position: sitting  Prep: Betadine  Patient monitoring: continuous pulse ox, continuous capnometry, frequent blood pressure checks and oxygen  Approach: midline  Location: L2/L3  Provider prep: mask and sterile gloves  Needle  Needle type: Zoya   Needle gauge: 25 G  Needle length: 3.5 in  Assessment  Sensory level: T6  Swirl obtained: Yes  CSF: clear  Attempts: 2  Hemodynamics: stable  Preanesthetic Checklist  Completed: patient identified, IV checked, site marked, risks and benefits discussed, surgical/procedural consents, equipment checked, pre-op evaluation, timeout performed, anesthesia consent given, oxygen available, monitors applied/VS acknowledged, fire risk safety assessment completed and verbalized and blood product R/B/A discussed and consented

## 2025-06-30 NOTE — PLAN OF CARE
Problem: ABCDS Injury Assessment  Goal: Absence of physical injury  6/30/2025 1926 by Val No RN  Outcome: Progressing  6/30/2025 1601 by Jacqueline Nichols RN  Outcome: Progressing     Problem: Pain  Goal: Verbalizes/displays adequate comfort level or baseline comfort level  6/30/2025 1926 by Val No RN  Outcome: Progressing  6/30/2025 1601 by Jacqueline Nichols RN  Outcome: Progressing     Problem: Safety - Adult  Goal: Free from fall injury  6/30/2025 1926 by Val No RN  Outcome: Progressing  6/30/2025 1601 by Jacqueline Nichols RN  Outcome: Progressing     Problem: Discharge Planning  Goal: Discharge to home or other facility with appropriate resources  6/30/2025 1926 by Val No RN  Outcome: Progressing  6/30/2025 1601 by Jacqueline Nichols RN  Outcome: Progressing

## 2025-06-30 NOTE — ANESTHESIA PROCEDURE NOTES
Peripheral Block    Patient location during procedure: pre-op  Reason for block: post-op pain management  Start time: 6/30/2025 11:37 AM  End time: 6/30/2025 11:40 AM  Staffing  Performed: resident/CRNA   Resident/CRNA: Mikala Michelle APRN - CRNA  Performed by: Mikala Michelle APRN - CRNA  Authorized by: Mikala Michelle APRN - CRNA    Preanesthetic Checklist  Completed: patient identified, IV checked, site marked, risks and benefits discussed, surgical/procedural consents, equipment checked, pre-op evaluation, timeout performed, anesthesia consent given, oxygen available, monitors applied/VS acknowledged, fire risk safety assessment completed and verbalized and blood product R/B/A discussed and consented  Peripheral Block   Prep: ChloraPrep  Provider prep: mask  Patient monitoring: cardiac monitor, continuous pulse ox, continuous capnometry, frequent blood pressure checks, IV access, oxygen and responsive to questions  Block type: Saphenous  Laterality: right  Injection technique: single-shot  Guidance: ultrasound guided    Needle   Needle type: short-bevel   Needle gauge: 20 G  Needle localization: anatomical landmarks and ultrasound guidance  Needle length: 8 cm  Assessment   Injection assessment: negative aspiration for heme, no paresthesia on injection, local visualized surrounding nerve on ultrasound and no intravascular symptoms  Paresthesia pain: none  Slow fractionated injection: yes  Hemodynamics: stable  Outcomes: patient tolerated procedure well and uncomplicated    Additional Notes  Pt. States she is not allergic to lidocaine.  She has received it in past surgeries without issues.  Medications Administered  dexAMETHasone (DECADRON) injection 4 mg/mL - Perineural   4 mg - 6/30/2025 11:37:00 AM  BUPivacaine (MARCAINE) PF injection 0.5% - Perineural   20 mL - 6/30/2025 11:37:00 AM

## 2025-06-30 NOTE — PERIOP NOTE
TRANSFER - OUT REPORT:    Verbal report given to ZULEMA Nichols RN on Ana Prather  being transferred to [facility] for routine post-op       Report consisted of patient's Situation, Background, Assessment and   Recommendations(SBAR).     Information from the following report(s) Nurse Handoff Report, Adult Overview, Surgery Report, and MAR was reviewed with the receiving nurse.           Lines:   Peripheral IV 06/30/25 Left Wrist (Active)   Site Assessment Clean, dry & intact 06/30/25 1357   Line Status Infusing 06/30/25 1357   Phlebitis Assessment No symptoms 06/30/25 1357   Infiltration Assessment 0 06/30/25 1357   Dressing Status Clean, dry & intact 06/30/25 1357   Dressing Type Transparent 06/30/25 1220        Opportunity for questions and clarification was provided.      Patient transported with:  Registered Nurse, JOSEPH Somers RN  /68   Pulse 65   Temp 97.4 °F (36.3 °C) (Temporal)   Resp 20   Ht 1.626 m (5' 4\")   Wt 107.7 kg (237 lb 6.4 oz)   LMP 12/12/2000   SpO2 96%   BMI 40.75 kg/m²

## 2025-06-30 NOTE — ANESTHESIA PRE PROCEDURE
Department of Anesthesiology  Preprocedure Note       Name:  Ana Prather   Age:  75 y.o.  :  1950                                          MRN:  [id number]         Date:  2025      Surgeon: Surgeon(s):  Lexa Ramírez MD    Procedure: Procedure(s):  RIGHT TOTAL KNEE REPLACEMENT (Possible Semi Constrained - INPATIENT)    Medications prior to admission:   Prior to Admission medications    Medication Sig Start Date End Date Taking? Authorizing Provider   atomoxetine (STRATTERA) 10 MG capsule Take 1 capsule by mouth daily 25  Yes Erika Thompson DO   calcium carbonate (TUMS) 500 MG chewable tablet Take 1 tablet by mouth daily   Yes ProviderJeanna MD   thyroid (ARMOUR) 60 MG tablet Take 1 tablet by mouth daily   Yes ProviderJeanna MD   UNABLE TO FIND CBD cream   Knee pain   Yes ProviderJeanna MD   Multiple Vitamins-Minerals (THERAPEUTIC MULTIVITAMIN-MINERALS) tablet Take 1 tablet by mouth daily   Yes Jeanna Garcia MD   Magnesium 500 MG CAPS Take by mouth   Yes ProviderJeanna MD   ascorbic acid (VITAMIN C) 250 MG tablet Take by mouth   Yes Automatic Reconciliation, Ar   vitamin D3 (CHOLECALCIFEROL) 125 MCG (5000 UT) TABS tablet 70020 iu (in drop form), K2   Yes Automatic Reconciliation, Ar   fluticasone (FLONASE) 50 MCG/ACT nasal spray 2 sprays by Nasal route daily   Yes Automatic Reconciliation, Ar   cephALEXin (KEFLEX) 500 MG capsule Take 1 capsule by mouth in the morning and 1 capsule at noon and 1 capsule in the evening. Do all this for 7 days. Do not start medication until after surgery. 25  Lexa Ramírez MD   oxyCODONE-acetaminophen (PERCOCET) 5-325 MG per tablet Take 1 tablet by mouth every 4-6 hours as needed for Pain for up to 7 days. Do not start taking pain medication until after surgery! Max Daily Amount: 6 tablets 25  Sindi An, APRN - CNP   ondansetron (ZOFRAN-ODT) 8 MG TBDP disintegrating tablet Take 1

## 2025-06-30 NOTE — PROGRESS NOTES
Timeout with HAROON Michelle CRNA prior to nerve block at bedside. Site marked. Nurse was present at bedside during procedure. VSS. Spouse to lobby.

## 2025-06-30 NOTE — H&P
History and Physical    Subjective:     Ana Prather is a 75 y.o. with a past medical history for osteoarthritis, GERD, hypothyroidism, sleep apnea, patient presented to the facility today to undergo a right total knee arthroplasty with Dr. Ramírez.  Hospital medicine consulted for medication management and overnight observation. Patient assessed at the bedside, patient is alert and oriented x3, there are no acute signs and symptoms of distress. Patient denies chest pain, shortness of breath, nausea, vomiting, diarrhea. Patient agrees to admission for overnight observation, with plans to discharge home in the morning.    Admit to medical surgical unit for observation.    Past Medical History:   Diagnosis Date    ADHD (attention deficit hyperactivity disorder)     Anxiety     BPPV (benign paroxysmal positional vertigo)     Chronic back pain     Chronic fatigue syndrome 3/16/2012    Clostridium difficile infection     Fibromyalgia     GERD (gastroesophageal reflux disease) 2015    Hearing loss     Hyperthyroidism     Liver disease     Obesity     ALBERTO on CPAP     BiPAP    Osteoarthritis     Other dyspnea and respiratory abnormality     Palpitations     PAC OCCASSIONALLY    Prediabetes     Prolonged emergence from general anesthesia     NEEDS VERY LITTLE MEDICATION TO PUT TO SLEEP    Restless legs syndrome     Skin cancer 3/16/2012    Unspecified hypothyroidism     goiter    Urinary incontinence 2013    Vitamin D deficiency       Past Surgical History:   Procedure Laterality Date    BREAST LUMPECTOMY  7/2013    right breast    CATARACT REMOVAL Bilateral     EYE SURGERY  2020    HYSTERECTOMY, TOTAL ABDOMINAL (CERVIX REMOVED)  July 24, 2023    HYSTEROSCOPY N/A 06/09/2023    HYSTEROSCOPY, DILATION AND CURETTAGE performed by Lico Jean Jr., MD at [facility] MAIN OR    TONSILLECTOMY      UROLOGICAL SURGERY      urethral opening was widened     Family History   Problem Relation Age of Onset    Cancer Mother         Multi

## 2025-07-01 ENCOUNTER — TELEPHONE (OUTPATIENT)
Age: 75
End: 2025-07-01

## 2025-07-01 VITALS
SYSTOLIC BLOOD PRESSURE: 133 MMHG | RESPIRATION RATE: 18 BRPM | DIASTOLIC BLOOD PRESSURE: 56 MMHG | WEIGHT: 237.4 LBS | HEIGHT: 64 IN | HEART RATE: 68 BPM | BODY MASS INDEX: 40.53 KG/M2 | TEMPERATURE: 97.9 F | OXYGEN SATURATION: 96 %

## 2025-07-01 DIAGNOSIS — M17.11 PRIMARY OSTEOARTHRITIS OF RIGHT KNEE: Primary | ICD-10-CM

## 2025-07-01 LAB
ANION GAP SERPL CALC-SCNC: 10 MMOL/L
BUN SERPL-MCNC: 18 MG/DL (ref 6–23)
BUN/CREAT SERPL: 23
CA-I BLD-MCNC: 9 MG/DL (ref 8.5–10.1)
CHLORIDE SERPL-SCNC: 105 MMOL/L (ref 98–107)
CO2 SERPL-SCNC: 25 MMOL/L (ref 21–32)
CREAT SERPL-MCNC: 0.75 MG/DL (ref 0.6–1.3)
ERYTHROCYTE [DISTWIDTH] IN BLOOD BY AUTOMATED COUNT: 12.2 % (ref 11.6–14.5)
GLUCOSE SERPL-MCNC: 153 MG/DL (ref 74–108)
HCT VFR BLD AUTO: 36.9 % (ref 35–45)
HGB BLD-MCNC: 12 G/DL (ref 12–16)
MCH RBC QN AUTO: 29.6 PG (ref 24–34)
MCHC RBC AUTO-ENTMCNC: 32.5 G/DL (ref 31–37)
MCV RBC AUTO: 91.1 FL (ref 78–100)
NRBC # BLD: 0 K/UL (ref 0–0.01)
NRBC BLD-RTO: 0 PER 100 WBC
PLATELET # BLD AUTO: 280 K/UL (ref 135–420)
PMV BLD AUTO: 10.1 FL (ref 9.2–11.8)
POTASSIUM SERPL-SCNC: 4 MMOL/L (ref 3.5–5.5)
RBC # BLD AUTO: 4.05 M/UL (ref 4.2–5.3)
SODIUM SERPL-SCNC: 140 MMOL/L (ref 136–145)
WBC # BLD AUTO: 13.2 K/UL (ref 4.6–13.2)

## 2025-07-01 PROCEDURE — 85027 COMPLETE CBC AUTOMATED: CPT

## 2025-07-01 PROCEDURE — G0378 HOSPITAL OBSERVATION PER HR: HCPCS

## 2025-07-01 PROCEDURE — 6370000000 HC RX 637 (ALT 250 FOR IP)

## 2025-07-01 PROCEDURE — 80048 BASIC METABOLIC PNL TOTAL CA: CPT

## 2025-07-01 PROCEDURE — 97530 THERAPEUTIC ACTIVITIES: CPT

## 2025-07-01 PROCEDURE — 97116 GAIT TRAINING THERAPY: CPT

## 2025-07-01 PROCEDURE — 94761 N-INVAS EAR/PLS OXIMETRY MLT: CPT

## 2025-07-01 PROCEDURE — 97161 PT EVAL LOW COMPLEX 20 MIN: CPT

## 2025-07-01 PROCEDURE — 36415 COLL VENOUS BLD VENIPUNCTURE: CPT

## 2025-07-01 RX ORDER — HYDROMORPHONE HYDROCHLORIDE 2 MG/1
2 TABLET ORAL
Qty: 30 TABLET | Refills: 0 | Status: SHIPPED | OUTPATIENT
Start: 2025-07-01 | End: 2025-07-08

## 2025-07-01 RX ADMIN — THYROID 60 MG: 60 TABLET ORAL at 07:52

## 2025-07-01 RX ADMIN — OXYCODONE HYDROCHLORIDE 5 MG: 5 TABLET ORAL at 09:13

## 2025-07-01 RX ADMIN — ASPIRIN 81 MG: 81 TABLET, COATED ORAL at 07:51

## 2025-07-01 RX ADMIN — LEVOTHYROXINE SODIUM 75 MCG: 75 CAPSULE ORAL at 07:52

## 2025-07-01 RX ADMIN — ACETAMINOPHEN 650 MG: 325 TABLET ORAL at 07:51

## 2025-07-01 RX ADMIN — ACETAMINOPHEN 650 MG: 325 TABLET ORAL at 03:46

## 2025-07-01 ASSESSMENT — PAIN DESCRIPTION - PAIN TYPE
TYPE: SURGICAL PAIN
TYPE: SURGICAL PAIN

## 2025-07-01 ASSESSMENT — PAIN SCALES - GENERAL
PAINLEVEL_OUTOF10: 7
PAINLEVEL_OUTOF10: 0
PAINLEVEL_OUTOF10: 8
PAINLEVEL_OUTOF10: 5
PAINLEVEL_OUTOF10: 0
PAINLEVEL_OUTOF10: 1
PAINLEVEL_OUTOF10: 3
PAINLEVEL_OUTOF10: 3

## 2025-07-01 ASSESSMENT — PAIN DESCRIPTION - ORIENTATION
ORIENTATION: RIGHT

## 2025-07-01 ASSESSMENT — PAIN DESCRIPTION - LOCATION
LOCATION: KNEE

## 2025-07-01 ASSESSMENT — PAIN DESCRIPTION - DESCRIPTORS
DESCRIPTORS: ACHING
DESCRIPTORS: SORE
DESCRIPTORS: ACHING;BURNING
DESCRIPTORS: ACHING

## 2025-07-01 ASSESSMENT — PAIN DESCRIPTION - ONSET
ONSET: GRADUAL
ONSET: GRADUAL

## 2025-07-01 ASSESSMENT — PAIN - FUNCTIONAL ASSESSMENT
PAIN_FUNCTIONAL_ASSESSMENT: ACTIVITIES ARE NOT PREVENTED
PAIN_FUNCTIONAL_ASSESSMENT: ACTIVITIES ARE NOT PREVENTED

## 2025-07-01 ASSESSMENT — PAIN DESCRIPTION - FREQUENCY
FREQUENCY: INTERMITTENT
FREQUENCY: INTERMITTENT

## 2025-07-01 ASSESSMENT — PAIN DESCRIPTION - DIRECTION
RADIATING_TOWARDS: NON RADIATING
RADIATING_TOWARDS: NON RADIATING

## 2025-07-01 NOTE — TELEPHONE ENCOUNTER
RT TKR 6.30.25      Pt called in stating that she is in severe pain. She was inpatient at the hospital and just got home. She last had Oxycodone this morning and it did not help her pain level. She reports also receiving gabapentin and tylenol. She is asking if there is something else we can prescribe for pain control.             [id number] [place], [place] - [address] [facility] -  [phone] - F [phone] [[id number]]

## 2025-07-01 NOTE — THERAPY EVALUATION
PHYSICAL THERAPY EVALUATION    Patient: Ana Prather (75 y.o. female)  Date: 6/30/2025  Physical Therapy Time:   PT Individual Minutes  Time In: 1630  Time Out: 1705  Minutes: 35  Timed Minute Breakdown:  15 Eval. 20 GT.     Primary Diagnosis: Osteoarthritis of right knee [M17.11]  S/P total knee arthroplasty, right [Z96.651] Osteoarthritis of right knee  Present on Admission:   S/P total knee arthroplasty, right   Procedure(s) (LRB):  RIGHT TOTAL KNEE REPLACEMENT SEMI CONSTRAINED INPATIENT (Right)   Precautions:   Restrictions/Precautions  Restrictions/Precautions: Fall Risk, Weight Bearing Lower Extremity Weight Bearing Restrictions  Right Lower Extremity Weight Bearing: Weight Bearing As Tolerated    Assessment: Pt is a 75 y.o. F admitted s/p R TKA. She has decreased R knee strength, ROM, increased pain, and difficulty with balance. These deficits limit her ability to perform transfers, ambulate and navigate stairs. OP PT recommended in order to address deficits and return to PLOF,  Performance Deficits/Impairments: Decreased functional mobility ;Decreased ROM;Decreased strength;Decreased balance;Increased pain  Treatment Diagnosis: Difficulty in walking.  Therapy Prognosis: Excellent  Discharge Recommendations: Outpatient PT    Conditions Requiring skilled therapeutic intervention:  Performance Deficits/Impairments: Decreased functional mobility ;Decreased ROM;Decreased strength;Decreased balance;Increased pain     Evaluation Activity Tolerance:   Activity Tolerance  Activity Tolerance: Patient limited by pain    Equipment Recommendations for Discharge:   N/A    AM-PAC  AM-PAC Inpatient Mobility Raw Score : 22; Current research shows that an AM-PAC score of 17 or less is typically not associated with a discharge to the patient's home setting, whereas a score of 18 or greater is typically associated with a discharge to the patient's home setting.    Factors which may influence rehabilitation potential

## 2025-07-01 NOTE — PROGRESS NOTES
2115- IV to left FA shows swelling, redness, and pain to touch - iv removed, elevated and ice pack applied.  New iv was placed to right FA - infusing. New ice pack applied to right knee.    0658- Redness and swelling resolved left FA, no complaints.

## 2025-07-01 NOTE — PLAN OF CARE
Problem: ABCDS Injury Assessment  Goal: Absence of physical injury  7/1/2025 0745 by Devin Raymundo RN  Outcome: Progressing  6/30/2025 1926 by Val No RN  Outcome: Progressing     Problem: Pain  Goal: Verbalizes/displays adequate comfort level or baseline comfort level  7/1/2025 0745 by Devin Raymundo RN  Outcome: Progressing  Flowsheets (Taken 6/30/2025 2007 by Lm Ramírez RN)  Verbalizes/displays adequate comfort level or baseline comfort level:   Encourage patient to monitor pain and request assistance   Assess pain using appropriate pain scale  6/30/2025 1926 by Val No RN  Outcome: Progressing     Problem: Safety - Adult  Goal: Free from fall injury  7/1/2025 0745 by Devin Raymundo RN  Outcome: Progressing  6/30/2025 1926 by Val No RN  Outcome: Progressing     Problem: Discharge Planning  Goal: Discharge to home or other facility with appropriate resources  7/1/2025 0745 by Devin Raymundo RN  Outcome: Progressing  Flowsheets (Taken 6/30/2025 2042 by Val No RN)  Discharge to home or other facility with appropriate resources:   Identify barriers to discharge with patient and caregiver   Refer to discharge planning if patient needs post-hospital services based on physician order or complex needs related to functional status, cognitive ability or social support system   Arrange for interpreters to assist at discharge as needed   Identify discharge learning needs (meds, wound care, etc)   Arrange for needed discharge resources and transportation as appropriate  6/30/2025 1926 by Val No RN  Outcome: Progressing     Problem: Skin/Tissue Integrity - Adult  Goal: Skin integrity remains intact  Outcome: Progressing  Flowsheets (Taken 6/30/2025 2042 by Val No RN)  Skin Integrity Remains Intact:   Monitor for areas of redness and/or skin breakdown   Positioning devices   Turn and reposition as indicated   Assess need

## 2025-07-01 NOTE — PROGRESS NOTES
Patient is AAOx4. RA. VSS. Pain is controlled via PRN medications. MAHAD stocking and SCDs on. Patient has been ambulating via walker multiple times per night with RN assistance. Call bell within reach. Medicated as per the MAR.

## 2025-07-01 NOTE — PROGRESS NOTES
Physical Therapy  Facility/Department: [address]  Daily Treatment Note  NAME: Ana Prather  : 1950  MRN: [id number]    Date of Service: 2025    Discharge Recommendations:  Outpatient PT   PT Equipment Recommendations  Equipment Needed: No    Patient Diagnosis(es): There were no encounter diagnoses.    Assessment  Assessment: Pt sitting on side of bed upon entry and agreeable to P.T. She ambulates with a RW and navigates stairs with MOD (I). She returns to room and is further educated on exercises and sit <> stand transfers. She states understanding and is left with all needs met.  Activity Tolerance: Patient limited by pain  Equipment Needed: No    Plan  Physical Therapy Plan  General Plan: Discharge  Days Per Week: 5 Days  PT Plan of Care: 5 times/week    Restrictions  Restrictions/Precautions  Restrictions/Precautions: Fall Risk, Weight Bearing  Lower Extremity Weight Bearing Restrictions  Right Lower Extremity Weight Bearing: Weight Bearing As Tolerated     Subjective   Subjective  Subjective: I'm a little stiff this morning  Pain: 9/10 R knee    Objective  Bed Mobility Training  Bed Mobility Training: No  Balance  Sitting: Intact  Standing: Intact  Transfer Training  Transfer Training: Yes  Sit to Stand: Modified independent (pt education on making stands easier by placing R foot more in front, she practices a couple of times and states understanding.)  Stand to Sit: Modified independent  Gait Training  Gait Training: Yes  Gait  Overall Level of Assistance: Modified independent  Distance (ft): 180 Feet  Assistive Device: Walker, rolling  Gait Abnormalities: Antalgic  Rail Use: Both  Stairs - Level of Assistance: Modified independent  Number of Stairs Trained: 4 (nonreciprocating)    Pain  Pre-Pain: 9  Post-Pain: 9  Pain Location: Right;Knee  Pain Descriptor: Aching  Pain Interventions: Ice;Nurse notified    R knee ROM: (8) 111-50 (58)    Goals  Short Term Goals  Time Frame for Short Term Goals: 7

## 2025-07-01 NOTE — DISCHARGE SUMMARY
Discharge Summary       PATIENT ID: Ana Prather  MRN: [id number]   YOB: 1950    DATE OF ADMISSION: 6/30/2025  7:28 AM    DATE OF DISCHARGE: 07/01/25    PRIMARY CARE PROVIDER: Eirka Thompson DO     ATTENDING PHYSICIAN: Bryan Dimas MD  DISCHARGING PROVIDER: Bryan Dimas MD        CONSULTATIONS: IP CONSULT TO ORTHOPEDIC SURGERY    PROCEDURES/SURGERIES: Procedure(s):  RIGHT TOTAL KNEE REPLACEMENT SEMI CONSTRAINED INPATIENT    Admission Diagnoses:   Osteoarthritis of right knee [M17.11]  S/P total knee arthroplasty, right [Z96.651]    Discharge Medications     Medication List        PAUSE taking these medications      clindamycin 300 MG capsule  Wait to take this until: July 2, 2025  Commonly known as: Cleocin  Take 1 capsule by mouth every 8 (eight) hours for 7 days Start antibiotics after surgery  Ask about: Should I take this medication?     oxyCODONE-acetaminophen 5-325 MG per tablet  Wait to take this until: July 2, 2025  Commonly known as: Percocet  Take 1 tablet by mouth every 4-6 hours as needed for Pain for up to 7 days. Do not start taking pain medication until after surgery! Max Daily Amount: 6 tablets  Ask about: Should I take this medication?            START taking these medications      cephALEXin 500 MG capsule  Commonly known as: KEFLEX  Take 1 capsule by mouth in the morning and 1 capsule at noon and 1 capsule in the evening. Do all this for 7 days. Do not start medication until after surgery.            CONTINUE taking these medications      ascorbic acid 250 MG tablet  Commonly known as: VITAMIN C     aspirin 81 MG EC tablet  Take 1 tablet by mouth in the morning and at bedtime DO NOT START MEDICATION UNTIL 24 HRS  AFTER SURGERY    enteric coated aspirin     atomoxetine 10 MG capsule  Commonly known as: Strattera  Take 1 capsule by mouth daily     calcium carbonate 500 MG chewable tablet  Commonly known as: TUMS     fluticasone 50 MCG/ACT nasal spray  Commonly

## 2025-07-02 ENCOUNTER — TELEPHONE (OUTPATIENT)
Age: 75
End: 2025-07-02

## 2025-07-02 DIAGNOSIS — M17.11 PRIMARY OSTEOARTHRITIS OF RIGHT KNEE: Primary | ICD-10-CM

## 2025-07-02 RX ORDER — HYDROMORPHONE HYDROCHLORIDE 2 MG/1
2 TABLET ORAL
Qty: 30 TABLET | Refills: 0 | Status: SHIPPED | OUTPATIENT
Start: 2025-07-02 | End: 2025-07-09

## 2025-07-02 NOTE — TELEPHONE ENCOUNTER
Pt had a RT TKR 06.30.2025    Pt states she had a fever last night of 101. She also stated that she is in intense pain, she is not having calf pain but she is having joint pain. Pt also reports chills,shaking and weakness.     She states her face is red and her knee is bruised     Pt took her temperature this morning and it was 98.6      [facility], she would like it to be resent to [facility] [address] [place] [address] [id number] - P [phone] - F [phone] [[id number]]

## 2025-07-03 ENCOUNTER — TELEPHONE (OUTPATIENT)
Age: 75
End: 2025-07-03

## 2025-07-03 DIAGNOSIS — Z96.651 TOTAL KNEE REPLACEMENT STATUS, RIGHT: Primary | ICD-10-CM

## 2025-07-03 RX ORDER — DOXYCYCLINE HYCLATE 100 MG
100 TABLET ORAL 2 TIMES DAILY
Qty: 14 TABLET | Refills: 0 | Status: SHIPPED | OUTPATIENT
Start: 2025-07-03 | End: 2025-07-10

## 2025-07-03 NOTE — TELEPHONE ENCOUNTER
PT HAD A RT TKR 06.30.2025    PT STATES SHE CAN NOT TAKE THE CEPHALEXIN NOR THE CLINDAMYCIN AS SHE IS ALLERGIC      PT STATES SHE HAS HAD NO ABX SINCE SX.

## 2025-07-03 NOTE — TELEPHONE ENCOUNTER
Spoke with Dr. Ramírez. Due to patients multiple allergies she is unable to take bactrim. Doxycycline 100mg Q12 x 7 days was sent in.

## 2025-07-07 ENCOUNTER — TELEPHONE (OUTPATIENT)
Age: 75
End: 2025-07-07

## 2025-07-07 NOTE — TELEPHONE ENCOUNTER
Jaquelin from home health PT called and states when she got to the patients home, patient was c/o 10/10 pain in her back,hip and knee. Patient states that she went to the ED for pain. Looking in her chart she went to the ED via EMS the day she was discharged from the hospital for her knee surgery. Percocet was not helping her pain so Dilaudid was sent in. Patient is stating now that she took Dilaudid last night at 2110, she took her antibiotic today at 1100 and had some vomiting. Patient states that she did not eat prior to taking the medication. Advised patient to eat prior to taking any of her medications to decrease GI upset. Patient states that she never eats before taking her medications. She states that she is having back and hip pain when lying down. Advised patient again to eat prior to taking medications. Discussed with the patient the way pain meds work regarding taking them scheduled for a little bit to get it in her system to help control the pain vs . taking it daily.     VS: 154/64,HEART RATE 71, TEMP 97.6.     Please advise.

## 2025-07-08 ENCOUNTER — TELEPHONE (OUTPATIENT)
Age: 75
End: 2025-07-08

## 2025-07-08 ENCOUNTER — TELEMEDICINE (OUTPATIENT)
Age: 75
End: 2025-07-08

## 2025-07-08 DIAGNOSIS — M25.561 ACUTE PAIN OF RIGHT KNEE: ICD-10-CM

## 2025-07-08 DIAGNOSIS — M17.11 PRIMARY OSTEOARTHRITIS OF RIGHT KNEE: ICD-10-CM

## 2025-07-08 DIAGNOSIS — Z96.651 TOTAL KNEE REPLACEMENT STATUS, RIGHT: Primary | ICD-10-CM

## 2025-07-08 PROCEDURE — 99024 POSTOP FOLLOW-UP VISIT: CPT

## 2025-07-08 RX ORDER — SCOPOLAMINE 1 MG/3D
1 PATCH, EXTENDED RELEASE TRANSDERMAL
Qty: 4 PATCH | Refills: 0 | Status: SHIPPED | OUTPATIENT
Start: 2025-07-08 | End: 2025-07-20

## 2025-07-08 RX ORDER — CELECOXIB 100 MG/1
100 CAPSULE ORAL DAILY
Qty: 60 CAPSULE | Refills: 3 | Status: SHIPPED | OUTPATIENT
Start: 2025-07-08

## 2025-07-08 RX ORDER — ONDANSETRON 8 MG/1
8 TABLET, ORALLY DISINTEGRATING ORAL EVERY 8 HOURS PRN
Qty: 20 TABLET | Refills: 0 | Status: SHIPPED | OUTPATIENT
Start: 2025-07-08

## 2025-07-08 RX ORDER — METHYLPREDNISOLONE 4 MG/1
TABLET ORAL
Qty: 1 KIT | Refills: 0 | Status: SHIPPED | OUTPATIENT
Start: 2025-07-08

## 2025-07-08 NOTE — PROGRESS NOTES
days as needed. [place] foods should be consumed to avoid food aversions.    Follow-up: A follow-up appointment will be scheduled in a few weeks.    As part of continued conservative pain management options the patient was advised to utilize Tylenol or OTC NSAIDS as long as it is not medically contraindicated.     Ana Prather, was evaluated through a synchronous (real-time) audio-video encounter. The patient (or guardian if applicable) is aware that this is a billable service, which includes applicable co-pays. This Virtual Visit was conducted with patient's (and/or legal guardian's) consent. Patient identification was verified, and a caregiver was present when appropriate.   The patient was located at Home: [address] [place] [id number]  [facility] [place]  Provider was located at Facility (Appt Dept): [address] [place] [address] [place] [address]  Confirm you are appropriately licensed, registered, or certified to deliver care in the state where the patient is located as indicated above. If you are not or unsure, please re-schedule the visit: Yes, I confirm.     Return to Office:    Follow-up and Dispositions    Return in about 3 weeks (around 7/29/2025) for VIRTUAL.        Scribed by MIRA Madrid CNP as dictated by MANDY Madrid  Documentation, performed by, True and Accepted MANDY Madrid    The patient (or guardian, if applicable) and other individuals in attendance with the patient were advised that Artificial Intelligence will be utilized during this visit to record, process the conversation to generate a clinical note, and support improvement of the AI technology. The patient (or guardian, if applicable) and other individuals in attendance at the appointment consented to the use of AI, including the recording.

## 2025-07-08 NOTE — TELEPHONE ENCOUNTER
RT TKR/ 06.30.2025   Patient is taking   HYDROmorphone (DILAUDID)      Patients  regarding to having pain. They are aware it is normal. Patient has a vv today with you at 4:00

## 2025-07-08 NOTE — PATIENT INSTRUCTIONS
Post Operative Total Knee Replacement Instructions    PLEASE REMOVE YOUR LONG WHITE BANDAGE & STOCKING PRIOR TO CONNECTING TO YOUR APPOINTMENT       During your recovery from a total knee replacement, you will be participating in an OUTPATIENT physical therapy program. Your goal is to progress from a walker to a cane to nothing at all while walking, if possible, over the next 2 weeks.     You can now shower and get your incision wet, pat it dry afterwards. No further dressing changes will be required as long as there is no drainage, unless you have received the collagen care pack of bandages. Please continue with the care pack of bandages until all bandages are gone.  You may not submerge the leg in a bath, pool, hot tub or other body of water such as a lake until at least 6 weeks post surgery as long as there is no drainage from your incision, open areas along the incision, or areas of concern.    You may drive if you are not using any assistive devices to walk and are not using any narcotic pain medication.     You may discontinue your aspirin (if that is your primary blood thinner prescribed by Dr. Ramírez ) when you are at least 4 weeks out from surgery AND are no longer using a cane or walker.  If you are still using assistive devices, please DO NOT stop the aspirin until you are completely off them.  If you are on other blood thinners prescribed by another doctor please continue that until you are instructed to discontinue them.    You and your physical therapist will determine when to stop your physical therapy program.    CPM machines are to be used 2-3x a day for 30-45 mins at a time. CPM machines DO NOT replace the home exercises. For questions or issues with the [employer] equipment, please reach out to [employer] at [phone] or via email at [email or web address]     Narcotic pain medication can cause constipation.  You may take over the counter stool softeners such as Docusate Sodium or Miralax 1-2

## 2025-07-09 ENCOUNTER — TELEPHONE (OUTPATIENT)
Dept: PRIMARY CARE CLINIC | Facility: CLINIC | Age: 75
End: 2025-07-09

## 2025-07-09 ENCOUNTER — TELEMEDICINE (OUTPATIENT)
Dept: PRIMARY CARE CLINIC | Facility: CLINIC | Age: 75
End: 2025-07-09

## 2025-07-09 DIAGNOSIS — R73.9 ELEVATED BLOOD SUGAR: Primary | ICD-10-CM

## 2025-07-09 DIAGNOSIS — G89.18 POST-OP PAIN: ICD-10-CM

## 2025-07-09 DIAGNOSIS — Z96.651 S/P TOTAL KNEE ARTHROPLASTY, RIGHT: ICD-10-CM

## 2025-07-09 RX ORDER — BLOOD-GLUCOSE METER
1 KIT MISCELLANEOUS DAILY
Qty: 1 KIT | Refills: 0 | Status: SHIPPED | OUTPATIENT
Start: 2025-07-09

## 2025-07-09 RX ORDER — GLUCOSAMINE HCL/CHONDROITIN SU 500-400 MG
CAPSULE ORAL
Qty: 100 STRIP | Refills: 1 | Status: SHIPPED | OUTPATIENT
Start: 2025-07-09

## 2025-07-09 RX ORDER — AVOBENZONE, HOMOSALATE, OCTISALATE, OCTOCRYLENE 30; 40; 45; 26 MG/ML; MG/ML; MG/ML; MG/ML
1 CREAM TOPICAL DAILY
Qty: 100 EACH | Refills: 1 | Status: SHIPPED | OUTPATIENT
Start: 2025-07-09

## 2025-07-09 NOTE — PROGRESS NOTES
Health Decision Maker has been checked with the patient   Primary Decision Maker: Leena Lewis - [facility] - [phone]     AI form not signed    Chief Complaint   Patient presents with    Blood Sugar Problem    nerve damage     Patient thinks she has nerve damage in her hips and legs. Has been happening since surgery        \"Have you been to the ER, urgent care clinic since your last visit?  Hospitalized since your last visit?\"    See chart    “Have you seen or consulted any other health care providers outside of [facility] since your last visit?”    See chart      There were no vitals filed for this visit.   Depression: Not at risk (5/2/2025)    Received from [facility]    PHQ-2     Patient Health Questionnaire-2 Score: 0              Click Here for Release of Records Request    Chart reviewed: immunizations are documented.     There is no immunization history on file for this patient.   
al.Arthritis Rheum 2011;63(3):654-661.    8. Osiel AUSTIN et al.Arthritis Res Ther 2004;6       (2):86-89.    9. Barb ME, et al. Arthritis Rheum 2017;69(12):       229-2302.    10. Sally BERMUDEZ et al. Arthritis Res Ther 2015;17:25.           Review of Systems       Objective   Patient-Reported Vitals  No data recorded     Physical Exam  [INSTRUCTIONS:  \"[x]\" Indicates a positive item  \"[]\" Indicates a negative item  -- DELETE ALL ITEMS NOT EXAMINED]    Constitutional: [x] Appears well-developed and well-nourished [x] No apparent distress      [] Abnormal -     Mental status: [x] Alert and awake  [x] Oriented to person/place/time [x] Able to follow commands    [] Abnormal -     Eyes:   EOM    [x]  Normal    [] Abnormal -   Sclera  [x]  Normal    [] Abnormal -          Discharge [x]  None visible   [] Abnormal -     HENT: [x] Normocephalic, atraumatic  [] Abnormal -   [x] Mouth/Throat: Mucous membranes are moist    External Ears [x] Normal  [] Abnormal -    Neck: [x] No visualized mass [] Abnormal -     Pulmonary/Chest: [x] Respiratory effort normal   [x] No visualized signs of difficulty breathing or respiratory distress        [] Abnormal -      Musculoskeletal:   [x] Normal gait with no signs of ataxia         [x] Normal range of motion of neck        [] Abnormal -     Neurological:        [x] No Facial Asymmetry (Cranial nerve 7 motor function) (limited exam due to video visit)          [x] No gaze palsy        [] Abnormal -          Skin:        [x] No significant exanthematous lesions or discoloration noted on facial skin         [] Abnormal -            Psychiatric:       [x] Normal Affect [] Abnormal -        [x] No Hallucinations    Other pertinent observable physical exam findings:-           Ana MOJICA Yamilka, was evaluated through a synchronous (real-time) audio-video encounter. The patient (or guardian if applicable) is aware that this is a billable service, which includes applicable co-pays. This Virtual

## 2025-07-09 NOTE — TELEPHONE ENCOUNTER
Spoke with patient this morning about scheduling an appointment soon. We had an opening today at 1130 (30 min) so I offered the appointment to her and she said she will be able to do it today from home.

## 2025-07-11 ENCOUNTER — TELEPHONE (OUTPATIENT)
Dept: PRIMARY CARE CLINIC | Facility: CLINIC | Age: 75
End: 2025-07-11

## 2025-07-11 ENCOUNTER — TELEPHONE (OUTPATIENT)
Age: 75
End: 2025-07-11

## 2025-07-11 DIAGNOSIS — M25.561 ACUTE PAIN OF RIGHT KNEE: ICD-10-CM

## 2025-07-11 DIAGNOSIS — Z96.651 TOTAL KNEE REPLACEMENT STATUS, RIGHT: Primary | ICD-10-CM

## 2025-07-11 RX ORDER — GABAPENTIN 100 MG/1
100 CAPSULE ORAL 3 TIMES DAILY
Qty: 90 CAPSULE | Refills: 0 | Status: SHIPPED | OUTPATIENT
Start: 2025-07-11 | End: 2025-08-10

## 2025-07-11 NOTE — TELEPHONE ENCOUNTER
Returned patients call and informed her that we cannot send in any pain medications for her. She told me she wants to stay on Dilaudid but Ortho wont give her anymore. I explained to her that they did send a message to her surgeon asking for Gabapentin. She stated she was not aware of this. I recommended she go to the ER if she having this much pain and she said she already tried going to the ER and waited 7 hours. I told her to reach out to Ortho again to check to see which pain medication they can send in for her but we are not prescribing the pain medications.

## 2025-07-11 NOTE — TELEPHONE ENCOUNTER
Patient had knee surgery on June 30th with complications and they won't give her any more pain meds.  She wants to know if Dr Thompson will give her medicine.

## 2025-07-11 NOTE — TELEPHONE ENCOUNTER
RT TKR/ 06.30.2025      Patient is calling in regarding having nerve pain, she feels like she's having sharp shooting pain / burning in her hip down her leg.   Patient mentioned she is unable to sleep at night due to it, and she's been having the pain since the day after sx.     Patient was wondering if anything can help this?

## 2025-07-14 DIAGNOSIS — Z96.651 TOTAL KNEE REPLACEMENT STATUS, RIGHT: Primary | ICD-10-CM

## 2025-07-14 DIAGNOSIS — M25.561 ACUTE PAIN OF RIGHT KNEE: ICD-10-CM

## 2025-07-14 RX ORDER — HYDROMORPHONE HYDROCHLORIDE 2 MG/1
2 TABLET ORAL
Qty: 30 TABLET | Refills: 0 | Status: SHIPPED | OUTPATIENT
Start: 2025-07-14 | End: 2025-07-21

## 2025-07-14 NOTE — TELEPHONE ENCOUNTER
Patient called in requesting pain medication refill.      Surgery:       Medication: DILUADID 2MG      Last Refill: 06.30.25 RIGHT TKR      Pharmacy:[id number] - [place], [place] - [address] [place] [address] [id number] - P [phone] - F [phone]   [address] [place] [address] [address] [place] [id number]

## 2025-07-18 RX ORDER — METHOCARBAMOL 750 MG/1
750 TABLET, FILM COATED ORAL 4 TIMES DAILY
Qty: 40 TABLET | Refills: 0 | Status: SHIPPED | OUTPATIENT
Start: 2025-07-18 | End: 2025-07-28

## 2025-07-18 NOTE — TELEPHONE ENCOUNTER
Surya from [facility] called stating the patient is c/o pain 8/10.     Called patient to discuss her pain. She states that she is having pain throughout her knee and is having cramps in her thigh and foot. Patient states she is doing her HEP 3 times a day. She is getting home PT 3 times a week and starts OP PT on 7.21.25. Patient states that she is also icing every hour. Patient is not using her CPM at all states that she used it once and it hurt so she stopped using it. She wants something else for pain. She is already on Dilaudid 2mg

## 2025-07-21 NOTE — TELEPHONE ENCOUNTER
Patients  called requesting tramadol. I do not see that she was ever given tramadol from us. It has been pended if you want to sent it.

## 2025-07-22 ENCOUNTER — TELEPHONE (OUTPATIENT)
Dept: PRIMARY CARE CLINIC | Facility: CLINIC | Age: 75
End: 2025-07-22

## 2025-07-22 RX ORDER — TRAMADOL HYDROCHLORIDE 50 MG/1
50 TABLET ORAL EVERY 6 HOURS PRN
Qty: 26 TABLET | Refills: 0 | OUTPATIENT
Start: 2025-07-22 | End: 2025-07-29

## 2025-07-22 NOTE — TELEPHONE ENCOUNTER
Patient called and needs an order for Diagnostic mammogram to be sent to [facility] in [place] and Provided fax number to be sent to.    Fax number [phone].      Patient is asking for a call back once its been done so she can make the appointment

## 2025-07-22 NOTE — TELEPHONE ENCOUNTER
Called and informed the patient I faxed over the order to [facility] and received fax confirmation back. She said thank you

## 2025-07-23 DIAGNOSIS — Z12.31 ENCOUNTER FOR SCREENING MAMMOGRAM FOR MALIGNANT NEOPLASM OF BREAST: Primary | ICD-10-CM

## 2025-07-24 ENCOUNTER — TELEPHONE (OUTPATIENT)
Dept: PRIMARY CARE CLINIC | Facility: CLINIC | Age: 75
End: 2025-07-24

## 2025-07-24 NOTE — TELEPHONE ENCOUNTER
Patient called saying Dr. Thompson placed an order for her to get a Mammogram at [facility].  They told her that they will also need an  Breast Order as well.  Please fax to [facility] Office at Fax#[phone]

## 2025-07-28 DIAGNOSIS — Z12.39 ENCOUNTER FOR BREAST CANCER SCREENING USING NON-MAMMOGRAM MODALITY: Primary | ICD-10-CM

## 2025-07-28 NOTE — TELEPHONE ENCOUNTER
Patient called stated that because she has a hx of breast ca that an order for breast US needs to be ordered along with the mammogram

## 2025-07-29 ENCOUNTER — TELEMEDICINE (OUTPATIENT)
Age: 75
End: 2025-07-29

## 2025-07-29 DIAGNOSIS — Z96.651 TOTAL KNEE REPLACEMENT STATUS, RIGHT: Primary | ICD-10-CM

## 2025-07-29 DIAGNOSIS — M25.561 ACUTE PAIN OF RIGHT KNEE: ICD-10-CM

## 2025-07-29 NOTE — PROGRESS NOTES
Name: Ana Prather (:  1950) is a Established patient, presenting virtually for evaluation of the following:      [facility]  [address], [facility] [address]  [place] [address]  Dept: [phone]  Dept Fax: [phone]   Chief Complaint   Patient presents with    Post-Op Check    Knee Pain     LMP 2000    Allergies   Allergen Reactions    Latex Other (See Comments) and Rash     Reaction Type: Allergy; Reaction(s): Rash, NOS,Swelling  Other reaction(s): Unknown      Cephalexin Anxiety, Diarrhea, Other (See Comments), Headaches and Nausea And Vomiting     Other reaction(s): GI intolerance  Other reaction(s): Diarrhea, Unknown    Ciprofloxacin Shortness Of Breath, Diarrhea, Nausea Only, Anxiety, Other (See Comments), Headaches and Myalgia       Cannot tolerate  Other reaction(s): Abdominal Pain, Abdominal Pain, Joint Pain, Lightheadedness    Gadolinium Derivatives Nausea And Vomiting, Palpitations, Other (See Comments), Headaches and Dizziness or Vertigo     MRI Contrast - shaking and trembling              Ibuprofen Hives           Iodinated Contrast Media Hives, Shortness Of Breath, Swelling and Other (See Comments)     Gets dizzy and shaky      Lidocaine Photosensitivity, Palpitations, Other (See Comments), Headaches and Dizziness or Vertigo     After breast biopsy, had a issue that she was advised was likely due to the Lidocaine.   Patient states causes shaking         Sulfa Antibiotics Diarrhea, Hives, Rash and Swelling     Other reaction(s): Unable to Obtain  \"brain fog\", aching  MRI Contrast      Wheat Other (See Comments)     Other reaction(s): GI intolerance    Adhesive Tape Swelling    Dulaglutide Nausea Only     Stomach discomfort  AND GEDOBENATE  Other reaction(s): GI intolerance  Pt does not recall reaction  Stomach discomfort      Milk-Related Compounds Diarrhea, Dizziness or Vertigo, Headaches and

## 2025-07-31 ENCOUNTER — TELEPHONE (OUTPATIENT)
Age: 75
End: 2025-07-31

## 2025-07-31 NOTE — TELEPHONE ENCOUNTER
Patient had a vv with you on 7.29.2025 and said she had forgotten to mention to you that when she is sitting  whether it is in the car, on the couch , in a chair when she stands back up her putting pressure down hurts from the knee to the thigh. She wanted to know you thoughts on that.

## 2025-08-03 DIAGNOSIS — M25.561 ACUTE PAIN OF RIGHT KNEE: ICD-10-CM

## 2025-08-03 DIAGNOSIS — Z96.651 STATUS POST TOTAL RIGHT KNEE REPLACEMENT: ICD-10-CM

## 2025-08-04 RX ORDER — TRAMADOL HYDROCHLORIDE 50 MG/1
50 TABLET ORAL EVERY 6 HOURS PRN
Qty: 28 TABLET | Refills: 0 | Status: SHIPPED | OUTPATIENT
Start: 2025-08-04 | End: 2025-08-11

## 2025-08-07 ENCOUNTER — OFFICE VISIT (OUTPATIENT)
Age: 75
End: 2025-08-07

## 2025-08-07 DIAGNOSIS — Z96.651 STATUS POST TOTAL RIGHT KNEE REPLACEMENT: Primary | ICD-10-CM

## 2025-08-07 PROCEDURE — 99024 POSTOP FOLLOW-UP VISIT: CPT | Performed by: ORTHOPAEDIC SURGERY

## 2025-08-07 RX ORDER — DOXYCYCLINE HYCLATE 100 MG
100 TABLET ORAL 2 TIMES DAILY
Qty: 14 TABLET | Refills: 0 | Status: SHIPPED | OUTPATIENT
Start: 2025-08-07 | End: 2025-08-14

## 2025-08-13 DIAGNOSIS — M17.11 OSTEOARTHRITIS OF RIGHT KNEE, UNSPECIFIED OSTEOARTHRITIS TYPE: Primary | ICD-10-CM

## 2025-08-18 ENCOUNTER — TELEPHONE (OUTPATIENT)
Age: 75
End: 2025-08-18

## 2025-08-18 DIAGNOSIS — M24.561 FLEXION CONTRACTURE OF KNEE, RIGHT: ICD-10-CM

## 2025-08-18 DIAGNOSIS — Z96.651 STATUS POST TOTAL RIGHT KNEE REPLACEMENT: Primary | ICD-10-CM

## 2025-08-19 RX ORDER — MIRTAZAPINE 15 MG/1
15 TABLET, FILM COATED ORAL NIGHTLY
Qty: 30 TABLET | Refills: 3 | Status: SHIPPED | OUTPATIENT
Start: 2025-08-19

## 2025-08-20 DIAGNOSIS — Z96.651 STATUS POST TOTAL RIGHT KNEE REPLACEMENT: Primary | ICD-10-CM

## 2025-08-22 ENCOUNTER — TELEPHONE (OUTPATIENT)
Age: 75
End: 2025-08-22

## 2025-08-27 ENCOUNTER — OFFICE VISIT (OUTPATIENT)
Dept: PRIMARY CARE CLINIC | Facility: CLINIC | Age: 75
End: 2025-08-27

## 2025-08-27 VITALS
RESPIRATION RATE: 18 BRPM | SYSTOLIC BLOOD PRESSURE: 120 MMHG | DIASTOLIC BLOOD PRESSURE: 68 MMHG | BODY MASS INDEX: 37.18 KG/M2 | OXYGEN SATURATION: 98 % | HEIGHT: 64 IN | HEART RATE: 82 BPM | TEMPERATURE: 97.9 F | WEIGHT: 217.8 LBS

## 2025-08-27 DIAGNOSIS — R22.2 MASS ON BACK: Primary | ICD-10-CM

## 2025-08-27 DIAGNOSIS — K76.0 HEPATIC STEATOSIS: ICD-10-CM

## 2025-08-27 DIAGNOSIS — E03.9 HYPOTHYROIDISM, UNSPECIFIED TYPE: ICD-10-CM

## 2025-08-27 LAB
ALBUMIN SERPL-MCNC: 3.8 G/DL (ref 3.5–5.2)
ALBUMIN/GLOB SERPL: 1.3 (ref 1.1–2.2)
ALP SERPL-CCNC: 68 U/L (ref 35–104)
ALT SERPL-CCNC: 38 U/L (ref 10–35)
AST SERPL-CCNC: 31 U/L (ref 10–35)
BILIRUB DIRECT SERPL-MCNC: 0.1 MG/DL (ref 0.1–0.3)
BILIRUB SERPL-MCNC: 0.3 MG/DL (ref 0–1.2)
GLOBULIN SER CALC-MCNC: 2.8 G/DL (ref 2–4)
PROT SERPL-MCNC: 6.7 G/DL (ref 6.4–8.3)
T4 FREE SERPL-MCNC: 1.2 NG/DL (ref 0.9–1.6)
TSH, 3RD GENERATION: 0.62 UIU/ML (ref 0.27–4.2)

## 2025-08-28 ENCOUNTER — RESULTS FOLLOW-UP (OUTPATIENT)
Dept: PRIMARY CARE CLINIC | Facility: CLINIC | Age: 75
End: 2025-08-28

## 2025-08-29 ENCOUNTER — TELEPHONE (OUTPATIENT)
Age: 75
End: 2025-08-29

## 2025-09-04 DIAGNOSIS — G89.4 CHRONIC PAIN SYNDROME: Primary | ICD-10-CM

## (undated) DEVICE — GLOVE SURG SZ 75 L1212IN FNGR THK138MIL BRN LTX FREE

## (undated) DEVICE — HOOD WITH PEEL AWAY FACE SHIELD: Brand: T7PLUS

## (undated) DEVICE — SUTURE VICRYL + SZ 3-0 L27IN ABSRB UD L24MM PS-1 3/8 CIR PRIM VCP936H

## (undated) DEVICE — COVER,TABLE,60X90,STERILE: Brand: MEDLINE

## (undated) DEVICE — DEVICE TISS REM IU CANSTR VAC TB FT PEDAL DISPOSABLE MYOSURE

## (undated) DEVICE — SOLUTION WND IRRIGATION 450 ML 0.5 PVP-I 0.9 NACL

## (undated) DEVICE — BASIC SINGLE BASIN-LF: Brand: MEDLINE INDUSTRIES, INC.

## (undated) DEVICE — SUTURE VICRYL + SZ 1 L27IN ANTIBACTERIAL POLYGLACTIN 910 W VCP281H

## (undated) DEVICE — PAD,ABDOMINAL,5"X9",STERILE,LF,1/PK: Brand: MEDLINE INDUSTRIES, INC.

## (undated) DEVICE — BASIC SINGLE BASIN BTC-LF: Brand: MEDLINE INDUSTRIES, INC.

## (undated) DEVICE — DRAPE,LITHOTOMY,STERILE: Brand: MEDLINE

## (undated) DEVICE — 3M™ IOBAN™ 2 ANTIMICROBIAL INCISE DRAPE 6650EZ: Brand: IOBAN™ 2

## (undated) DEVICE — PAD,SANITARY,11 IN,MAXI,N-STRL,IND WRAP: Brand: MEDLINE

## (undated) DEVICE — TOWEL SURG W17XL27IN STD BLU COT NONFENESTRATED PREWASHED

## (undated) DEVICE — APPLICATOR MEDICATED 26 CC SOLUTION HI LT ORNG CHLORAPREP

## (undated) DEVICE — TOTAL KNEE PACK: Brand: MEDLINE INDUSTRIES, INC.

## (undated) DEVICE — SOLUTION IRRIG 500ML 0.9% SOD CHLO USP POUR PLAS BTL

## (undated) DEVICE — GOWN,AURORA,FABRIC-REINFORCED,X-LARGE: Brand: MEDLINE

## (undated) DEVICE — GARMENT COMPR M FOR 13IN FT INTMIT SGL BLDR HEM FORC II

## (undated) DEVICE — 60-7070-106 TRNQT,DPSB,PLC BROWN: Brand: MEDLINE RENEWAL

## (undated) DEVICE — SUTURE VICRYL + SZ 0 L27IN ANTIBACTERIAL POLYGLACTIN 910 W VCP280H

## (undated) DEVICE — PREMIUM WET SKIN PREP TRAY: Brand: MEDLINE INDUSTRIES, INC.

## (undated) DEVICE — BOWL MIXING VAC PALABOWL PALACOS

## (undated) DEVICE — SYRINGE IRRIG 60ML SFT PLIABLE BLB EZ TO GRP 1 HND USE W/

## (undated) DEVICE — STRYKER PERFORMANCE SERIES SAGITTAL BLADE: Brand: STRYKER PERFORMANCE SERIES

## (undated) DEVICE — GLOVE SURG 7 BIOGEL PI ULTRATOUCH G

## (undated) DEVICE — ELECTRODE PT RET AD L9FT HI MOIST COND ADH HYDRGEL CORDED

## (undated) DEVICE — SOLUTION IRRIG 3000ML 0.9% SOD CHL USP UROMATIC PLAS CONT

## (undated) DEVICE — PACK PIN JOINT TOTAL

## (undated) DEVICE — DEVICE TSSUE RMVL D3MM L25.25N ENDSCPC INTRTRNE PLPS FBRDS M

## (undated) DEVICE — INTENDED FOR TISSUE SEPARATION, AND OTHER PROCEDURES THAT REQUIRE A SHARP SURGICAL BLADE TO PUNCTURE OR CUT.: Brand: BARD-PARKER SAFETY BLADES SIZE 10, STERILE

## (undated) DEVICE — GLOVE SURG UNDERGLOVE 7.5 PF BLU BIOGEL PI MIC LF

## (undated) DEVICE — COVER,TABLE,HEAVY DUTY,77"X90",STRL: Brand: MEDLINE

## (undated) DEVICE — GOWN,SIRUS,NONRNF,SETINSLV,XL,20/CS: Brand: MEDLINE

## (undated) DEVICE — MARKER,SKIN,WI/RULER AND LABELS: Brand: MEDLINE

## (undated) DEVICE — 4-PORT MANIFOLD: Brand: NEPTUNE 2

## (undated) DEVICE — SUTURE VICRYL + SZ 2-0 L27IN ABSRB UD CT-2 L26MM 1/2 CIR TAPR VCP269H

## (undated) DEVICE — GAUZE,SPONGE,4"X4",16PLY,STRL,LF,10/TRAY: Brand: MEDLINE

## (undated) DEVICE — SPONGE GZ W4XL4IN COT RADPQ HIGHLY ABSRB

## (undated) DEVICE — PADDING UNDERCAST W6INXL4YD RAYON POLY SYN NONADHESIVE

## (undated) DEVICE — BANDAGE COMPR W6INXL5YD WHT BGE POLY COT M E WRP WV HK AND

## (undated) DEVICE — LIQUIBAND RAPID ADHESIVE 36/CS 0.8ML: Brand: MEDLINE

## (undated) DEVICE — GARMENT,MEDLINE,DVT,INT,CALF,MED, GEN2: Brand: MEDLINE

## (undated) DEVICE — SOLUTION IV 500ML 0.9% SOD BOTTLE CHL LTWT DURABLE SHATTERPROOF

## (undated) DEVICE — COVER LT HNDL PLAS RIG 1 PER PK

## (undated) DEVICE — SHEET,DRAPE,UNDERBUTTOCK,GRAD POUCH,PORT: Brand: MEDLINE

## (undated) DEVICE — HANDPIECE SET WITH HIGH FLOW TIP AND SUCTION TUBE: Brand: INTERPULSE

## (undated) DEVICE — RECIPROCATING BLADE, DOUBLE SIDED, OFFSET  (70.0 X 1.0 X 12.5MM)